# Patient Record
Sex: MALE | Race: WHITE | NOT HISPANIC OR LATINO | Employment: OTHER | URBAN - METROPOLITAN AREA
[De-identification: names, ages, dates, MRNs, and addresses within clinical notes are randomized per-mention and may not be internally consistent; named-entity substitution may affect disease eponyms.]

---

## 2017-01-23 DIAGNOSIS — Z01.812 PRE-OPERATIVE LABORATORY EXAMINATION: ICD-10-CM

## 2017-01-23 LAB
25(OH)D3 SERPL-MCNC: 17 NG/ML (ref 30–100)
ANION GAP SERPL CALC-SCNC: 8 MMOL/L (ref 0–11.9)
APPEARANCE UR: CLEAR
APTT PPP: 32.1 SEC (ref 24.7–36)
BASOPHILS # BLD AUTO: 0.4 % (ref 0–1.8)
BASOPHILS # BLD: 0.04 K/UL (ref 0–0.12)
BILIRUB UR QL STRIP.AUTO: NEGATIVE
BUN SERPL-MCNC: 10 MG/DL (ref 8–22)
CALCIUM SERPL-MCNC: 9.5 MG/DL (ref 8.5–10.5)
CHLORIDE SERPL-SCNC: 101 MMOL/L (ref 96–112)
CO2 SERPL-SCNC: 25 MMOL/L (ref 20–33)
COLOR UR: COLORLESS
CREAT SERPL-MCNC: 0.69 MG/DL (ref 0.5–1.4)
CULTURE IF INDICATED INDCX: NO UA CULTURE
EOSINOPHIL # BLD AUTO: 0.46 K/UL (ref 0–0.51)
EOSINOPHIL NFR BLD: 5.2 % (ref 0–6.9)
ERYTHROCYTE [DISTWIDTH] IN BLOOD BY AUTOMATED COUNT: 50.3 FL (ref 35.9–50)
GFR SERPL CREATININE-BSD FRML MDRD: >60 ML/MIN/1.73 M 2
GLUCOSE SERPL-MCNC: 113 MG/DL (ref 65–99)
GLUCOSE UR STRIP.AUTO-MCNC: NEGATIVE MG/DL
HCT VFR BLD AUTO: 44.7 % (ref 42–52)
HGB BLD-MCNC: 14.6 G/DL (ref 14–18)
IMM GRANULOCYTES # BLD AUTO: 0.02 K/UL (ref 0–0.11)
IMM GRANULOCYTES NFR BLD AUTO: 0.2 % (ref 0–0.9)
INR PPP: 0.97 (ref 0.87–1.13)
KETONES UR STRIP.AUTO-MCNC: NEGATIVE MG/DL
LEUKOCYTE ESTERASE UR QL STRIP.AUTO: NEGATIVE
LYMPHOCYTES # BLD AUTO: 1.92 K/UL (ref 1–4.8)
LYMPHOCYTES NFR BLD: 21.6 % (ref 22–41)
MCH RBC QN AUTO: 30.4 PG (ref 27–33)
MCHC RBC AUTO-ENTMCNC: 32.7 G/DL (ref 33.7–35.3)
MCV RBC AUTO: 92.9 FL (ref 81.4–97.8)
MICRO URNS: NORMAL
MONOCYTES # BLD AUTO: 1.05 K/UL (ref 0–0.85)
MONOCYTES NFR BLD AUTO: 11.8 % (ref 0–13.4)
NEUTROPHILS # BLD AUTO: 5.4 K/UL (ref 1.82–7.42)
NEUTROPHILS NFR BLD: 60.8 % (ref 44–72)
NITRITE UR QL STRIP.AUTO: NEGATIVE
NRBC # BLD AUTO: 0 K/UL
NRBC BLD AUTO-RTO: 0 /100 WBC
PH UR STRIP.AUTO: 6 [PH]
PLATELET # BLD AUTO: 260 K/UL (ref 164–446)
PMV BLD AUTO: 9.3 FL (ref 9–12.9)
POTASSIUM SERPL-SCNC: 4.2 MMOL/L (ref 3.6–5.5)
PROT UR QL STRIP: NEGATIVE MG/DL
PROTHROMBIN TIME: 13.2 SEC (ref 12–14.6)
RBC # BLD AUTO: 4.81 M/UL (ref 4.7–6.1)
RBC UR QL AUTO: NEGATIVE
SODIUM SERPL-SCNC: 134 MMOL/L (ref 135–145)
SP GR UR STRIP.AUTO: 1
WBC # BLD AUTO: 8.9 K/UL (ref 4.8–10.8)

## 2017-01-23 PROCEDURE — 85025 COMPLETE CBC W/AUTO DIFF WBC: CPT

## 2017-01-23 PROCEDURE — 85610 PROTHROMBIN TIME: CPT

## 2017-01-23 PROCEDURE — 80048 BASIC METABOLIC PNL TOTAL CA: CPT

## 2017-01-23 PROCEDURE — 85730 THROMBOPLASTIN TIME PARTIAL: CPT

## 2017-01-23 PROCEDURE — 82306 VITAMIN D 25 HYDROXY: CPT

## 2017-01-23 PROCEDURE — 36415 COLL VENOUS BLD VENIPUNCTURE: CPT

## 2017-01-23 PROCEDURE — 81003 URINALYSIS AUTO W/O SCOPE: CPT

## 2017-01-23 RX ORDER — METOPROLOL SUCCINATE 25 MG/1
25 TABLET, EXTENDED RELEASE ORAL DAILY
COMMUNITY
End: 2017-09-21

## 2017-01-23 RX ORDER — TAMSULOSIN HYDROCHLORIDE 0.4 MG/1
0.4 CAPSULE ORAL
Status: ON HOLD | COMMUNITY
End: 2020-07-27

## 2017-01-23 RX ORDER — ATORVASTATIN CALCIUM 20 MG/1
20 TABLET, FILM COATED ORAL NIGHTLY
COMMUNITY

## 2017-01-23 RX ORDER — GABAPENTIN 100 MG/1
100 CAPSULE ORAL 3 TIMES DAILY
Status: ON HOLD | COMMUNITY
End: 2017-02-01

## 2017-01-23 RX ORDER — AMLODIPINE BESYLATE 5 MG/1
5 TABLET ORAL DAILY
Status: ON HOLD | COMMUNITY
End: 2017-09-25

## 2017-02-01 ENCOUNTER — HOSPITAL ENCOUNTER (OUTPATIENT)
Facility: MEDICAL CENTER | Age: 82
End: 2017-02-03
Attending: NEUROLOGICAL SURGERY | Admitting: NEUROLOGICAL SURGERY
Payer: COMMERCIAL

## 2017-02-01 ENCOUNTER — APPOINTMENT (OUTPATIENT)
Dept: RADIOLOGY | Facility: MEDICAL CENTER | Age: 82
End: 2017-02-01
Attending: NEUROLOGICAL SURGERY
Payer: COMMERCIAL

## 2017-02-01 DIAGNOSIS — M48.061 SPINAL STENOSIS, LUMBAR REGION, WITHOUT NEUROGENIC CLAUDICATION: ICD-10-CM

## 2017-02-01 PROCEDURE — 160036 HCHG PACU - EA ADDL 30 MINS PHASE I: Performed by: NEUROLOGICAL SURGERY

## 2017-02-01 PROCEDURE — 700101 HCHG RX REV CODE 250

## 2017-02-01 PROCEDURE — 160048 HCHG OR STATISTICAL LEVEL 1-5: Performed by: NEUROLOGICAL SURGERY

## 2017-02-01 PROCEDURE — G0378 HOSPITAL OBSERVATION PER HR: HCPCS

## 2017-02-01 PROCEDURE — 700102 HCHG RX REV CODE 250 W/ 637 OVERRIDE(OP): Performed by: PHYSICIAN ASSISTANT

## 2017-02-01 PROCEDURE — 700112 HCHG RX REV CODE 229: Performed by: PHYSICIAN ASSISTANT

## 2017-02-01 PROCEDURE — 501838 HCHG SUTURE GENERAL: Performed by: NEUROLOGICAL SURGERY

## 2017-02-01 PROCEDURE — 96374 THER/PROPH/DIAG INJ IV PUSH: CPT

## 2017-02-01 PROCEDURE — 700111 HCHG RX REV CODE 636 W/ 250 OVERRIDE (IP): Performed by: NEUROLOGICAL SURGERY

## 2017-02-01 PROCEDURE — A9270 NON-COVERED ITEM OR SERVICE: HCPCS | Performed by: PHYSICIAN ASSISTANT

## 2017-02-01 PROCEDURE — 503195 HCHG SEALER, BIPOLAR AQUAMANTYS: Performed by: NEUROLOGICAL SURGERY

## 2017-02-01 PROCEDURE — 110382 HCHG SHELL REV 271: Performed by: NEUROLOGICAL SURGERY

## 2017-02-01 PROCEDURE — 500002 HCHG ADHESIVE, DERMABOND: Performed by: NEUROLOGICAL SURGERY

## 2017-02-01 PROCEDURE — 502240 HCHG MISC OR SUPPLY RC 0272: Performed by: NEUROLOGICAL SURGERY

## 2017-02-01 PROCEDURE — 502708 HCHG CATHETER, ON-Q SILVER SKR: Performed by: NEUROLOGICAL SURGERY

## 2017-02-01 PROCEDURE — 110371 HCHG SHELL REV 272: Performed by: NEUROLOGICAL SURGERY

## 2017-02-01 PROCEDURE — 160041 HCHG SURGERY MINUTES - EA ADDL 1 MIN LEVEL 4: Performed by: NEUROLOGICAL SURGERY

## 2017-02-01 PROCEDURE — 700111 HCHG RX REV CODE 636 W/ 250 OVERRIDE (IP)

## 2017-02-01 PROCEDURE — 160029 HCHG SURGERY MINUTES - 1ST 30 MINS LEVEL 4: Performed by: NEUROLOGICAL SURGERY

## 2017-02-01 PROCEDURE — 700111 HCHG RX REV CODE 636 W/ 250 OVERRIDE (IP): Performed by: PHYSICIAN ASSISTANT

## 2017-02-01 PROCEDURE — 502626 HCHG SURGIFLO HEMOSTATIC MATRIX 6ML: Performed by: NEUROLOGICAL SURGERY

## 2017-02-01 PROCEDURE — 160035 HCHG PACU - 1ST 60 MINS PHASE I: Performed by: NEUROLOGICAL SURGERY

## 2017-02-01 PROCEDURE — 500367 HCHG DRAIN KIT, HEMOVAC: Performed by: NEUROLOGICAL SURGERY

## 2017-02-01 PROCEDURE — 700102 HCHG RX REV CODE 250 W/ 637 OVERRIDE(OP)

## 2017-02-01 PROCEDURE — 160002 HCHG RECOVERY MINUTES (STAT): Performed by: NEUROLOGICAL SURGERY

## 2017-02-01 PROCEDURE — 160009 HCHG ANES TIME/MIN: Performed by: NEUROLOGICAL SURGERY

## 2017-02-01 PROCEDURE — A6222 GAUZE <=16 IN NO W/SAL W/O B: HCPCS | Performed by: NEUROLOGICAL SURGERY

## 2017-02-01 PROCEDURE — A4606 OXYGEN PROBE USED W OXIMETER: HCPCS | Performed by: NEUROLOGICAL SURGERY

## 2017-02-01 PROCEDURE — A9270 NON-COVERED ITEM OR SERVICE: HCPCS

## 2017-02-01 PROCEDURE — 72020 X-RAY EXAM OF SPINE 1 VIEW: CPT

## 2017-02-01 RX ORDER — AMOXICILLIN 250 MG
1 CAPSULE ORAL NIGHTLY
Status: DISCONTINUED | OUTPATIENT
Start: 2017-02-01 | End: 2017-02-03 | Stop reason: HOSPADM

## 2017-02-01 RX ORDER — OMEPRAZOLE 40 MG/1
40 CAPSULE, DELAYED RELEASE ORAL DAILY
COMMUNITY
End: 2017-09-21

## 2017-02-01 RX ORDER — ENEMA 19; 7 G/133ML; G/133ML
1 ENEMA RECTAL
Status: DISCONTINUED | OUTPATIENT
Start: 2017-02-01 | End: 2017-02-03 | Stop reason: HOSPADM

## 2017-02-01 RX ORDER — LABETALOL HYDROCHLORIDE 5 MG/ML
10 INJECTION, SOLUTION INTRAVENOUS
Status: DISCONTINUED | OUTPATIENT
Start: 2017-02-01 | End: 2017-02-03 | Stop reason: HOSPADM

## 2017-02-01 RX ORDER — DIPHENHYDRAMINE HYDROCHLORIDE 50 MG/ML
25 INJECTION INTRAMUSCULAR; INTRAVENOUS EVERY 6 HOURS PRN
Status: DISCONTINUED | OUTPATIENT
Start: 2017-02-01 | End: 2017-02-03 | Stop reason: HOSPADM

## 2017-02-01 RX ORDER — ACETAMINOPHEN 500 MG
1000 TABLET ORAL
Status: ON HOLD | COMMUNITY
End: 2017-02-03

## 2017-02-01 RX ORDER — BISACODYL 10 MG
10 SUPPOSITORY, RECTAL RECTAL
Status: DISCONTINUED | OUTPATIENT
Start: 2017-02-01 | End: 2017-02-03 | Stop reason: HOSPADM

## 2017-02-01 RX ORDER — DIPHENHYDRAMINE HCL 25 MG
25 TABLET ORAL EVERY 6 HOURS PRN
Status: DISCONTINUED | OUTPATIENT
Start: 2017-02-01 | End: 2017-02-03 | Stop reason: HOSPADM

## 2017-02-01 RX ORDER — POLYETHYLENE GLYCOL 3350 17 G/17G
1 POWDER, FOR SOLUTION ORAL 2 TIMES DAILY PRN
Status: DISCONTINUED | OUTPATIENT
Start: 2017-02-01 | End: 2017-02-03 | Stop reason: HOSPADM

## 2017-02-01 RX ORDER — ONDANSETRON 4 MG/1
4 TABLET, ORALLY DISINTEGRATING ORAL EVERY 4 HOURS PRN
Status: DISCONTINUED | OUTPATIENT
Start: 2017-02-01 | End: 2017-02-03 | Stop reason: HOSPADM

## 2017-02-01 RX ORDER — METOPROLOL SUCCINATE 25 MG/1
25 TABLET, EXTENDED RELEASE ORAL DAILY
Status: DISCONTINUED | OUTPATIENT
Start: 2017-02-02 | End: 2017-02-03 | Stop reason: HOSPADM

## 2017-02-01 RX ORDER — MAGNESIUM HYDROXIDE 1200 MG/15ML
LIQUID ORAL
Status: DISCONTINUED | OUTPATIENT
Start: 2017-02-01 | End: 2017-02-01 | Stop reason: HOSPADM

## 2017-02-01 RX ORDER — AMLODIPINE BESYLATE 5 MG/1
5 TABLET ORAL DAILY
Status: DISCONTINUED | OUTPATIENT
Start: 2017-02-02 | End: 2017-02-03 | Stop reason: HOSPADM

## 2017-02-01 RX ORDER — SODIUM CHLORIDE AND POTASSIUM CHLORIDE 150; 900 MG/100ML; MG/100ML
INJECTION, SOLUTION INTRAVENOUS
Status: COMPLETED
Start: 2017-02-01 | End: 2017-02-01

## 2017-02-01 RX ORDER — SODIUM CHLORIDE AND POTASSIUM CHLORIDE 150; 900 MG/100ML; MG/100ML
INJECTION, SOLUTION INTRAVENOUS CONTINUOUS
Status: DISCONTINUED | OUTPATIENT
Start: 2017-02-01 | End: 2017-02-02

## 2017-02-01 RX ORDER — CEFAZOLIN SODIUM 2 G/100ML
2 INJECTION, SOLUTION INTRAVENOUS EVERY 8 HOURS
Status: COMPLETED | OUTPATIENT
Start: 2017-02-01 | End: 2017-02-02

## 2017-02-01 RX ORDER — BUPIVACAINE HYDROCHLORIDE AND EPINEPHRINE 5; 5 MG/ML; UG/ML
INJECTION, SOLUTION EPIDURAL; INTRACAUDAL; PERINEURAL
Status: DISCONTINUED | OUTPATIENT
Start: 2017-02-01 | End: 2017-02-01 | Stop reason: HOSPADM

## 2017-02-01 RX ORDER — SODIUM CHLORIDE, SODIUM LACTATE, POTASSIUM CHLORIDE, CALCIUM CHLORIDE 600; 310; 30; 20 MG/100ML; MG/100ML; MG/100ML; MG/100ML
1000 INJECTION, SOLUTION INTRAVENOUS
Status: COMPLETED | OUTPATIENT
Start: 2017-02-01 | End: 2017-02-01

## 2017-02-01 RX ORDER — AMOXICILLIN 250 MG
1 CAPSULE ORAL
Status: DISCONTINUED | OUTPATIENT
Start: 2017-02-01 | End: 2017-02-03 | Stop reason: HOSPADM

## 2017-02-01 RX ORDER — ZOLPIDEM TARTRATE 5 MG/1
5 TABLET ORAL
Status: DISCONTINUED | OUTPATIENT
Start: 2017-02-02 | End: 2017-02-03 | Stop reason: HOSPADM

## 2017-02-01 RX ORDER — ONDANSETRON 2 MG/ML
4 INJECTION INTRAMUSCULAR; INTRAVENOUS EVERY 4 HOURS PRN
Status: DISCONTINUED | OUTPATIENT
Start: 2017-02-01 | End: 2017-02-03 | Stop reason: HOSPADM

## 2017-02-01 RX ORDER — TAMSULOSIN HYDROCHLORIDE 0.4 MG/1
0.4 CAPSULE ORAL
Status: DISCONTINUED | OUTPATIENT
Start: 2017-02-02 | End: 2017-02-03 | Stop reason: HOSPADM

## 2017-02-01 RX ORDER — METHOCARBAMOL 750 MG/1
750 TABLET, FILM COATED ORAL EVERY 8 HOURS PRN
Status: DISCONTINUED | OUTPATIENT
Start: 2017-02-01 | End: 2017-02-03 | Stop reason: HOSPADM

## 2017-02-01 RX ORDER — DOCUSATE SODIUM 100 MG/1
100 CAPSULE, LIQUID FILLED ORAL 2 TIMES DAILY
Status: DISCONTINUED | OUTPATIENT
Start: 2017-02-01 | End: 2017-02-03 | Stop reason: HOSPADM

## 2017-02-01 RX ORDER — OXYCODONE HCL 5 MG/5 ML
SOLUTION, ORAL ORAL
Status: COMPLETED
Start: 2017-02-01 | End: 2017-02-01

## 2017-02-01 RX ORDER — ROPIVACAINE HYDROCHLORIDE 2 MG/ML
INJECTION, SOLUTION EPIDURAL; INFILTRATION; PERINEURAL
Status: DISCONTINUED | OUTPATIENT
Start: 2017-02-01 | End: 2017-02-01 | Stop reason: HOSPADM

## 2017-02-01 RX ORDER — ATORVASTATIN CALCIUM 20 MG/1
20 TABLET, FILM COATED ORAL NIGHTLY
Status: DISCONTINUED | OUTPATIENT
Start: 2017-02-01 | End: 2017-02-03 | Stop reason: HOSPADM

## 2017-02-01 RX ADMIN — FENTANYL CITRATE 25 MCG: 50 INJECTION, SOLUTION INTRAMUSCULAR; INTRAVENOUS at 19:25

## 2017-02-01 RX ADMIN — STANDARDIZED SENNA CONCENTRATE AND DOCUSATE SODIUM 1 TABLET: 8.6; 5 TABLET, FILM COATED ORAL at 22:54

## 2017-02-01 RX ADMIN — FENTANYL CITRATE 25 MCG: 50 INJECTION, SOLUTION INTRAMUSCULAR; INTRAVENOUS at 19:35

## 2017-02-01 RX ADMIN — DOCUSATE SODIUM 100 MG: 100 CAPSULE ORAL at 22:54

## 2017-02-01 RX ADMIN — FENTANYL CITRATE 25 MCG: 50 INJECTION, SOLUTION INTRAMUSCULAR; INTRAVENOUS at 19:05

## 2017-02-01 RX ADMIN — SODIUM CHLORIDE, SODIUM LACTATE, POTASSIUM CHLORIDE, CALCIUM CHLORIDE 1000 ML: 600; 310; 30; 20 INJECTION, SOLUTION INTRAVENOUS at 14:15

## 2017-02-01 RX ADMIN — ROPIVACAINE HYDROCHLORIDE: 2 INJECTION, SOLUTION EPIDURAL; INFILTRATION at 18:30

## 2017-02-01 RX ADMIN — CEFAZOLIN SODIUM 2 G: 2 INJECTION, SOLUTION INTRAVENOUS at 22:55

## 2017-02-01 RX ADMIN — ATORVASTATIN CALCIUM 20 MG: 20 TABLET, FILM COATED ORAL at 22:54

## 2017-02-01 RX ADMIN — OXYCODONE HYDROCHLORIDE 10 MG: 5 SOLUTION ORAL at 19:00

## 2017-02-01 RX ADMIN — POTASSIUM CHLORIDE AND SODIUM CHLORIDE: 900; 150 INJECTION, SOLUTION INTRAVENOUS at 19:50

## 2017-02-01 RX ADMIN — FENTANYL CITRATE 25 MCG: 50 INJECTION, SOLUTION INTRAMUSCULAR; INTRAVENOUS at 19:15

## 2017-02-01 RX ADMIN — MORPHINE SULFATE: 50 INJECTION, SOLUTION, CONCENTRATE INTRAVENOUS at 19:25

## 2017-02-01 ASSESSMENT — PAIN SCALES - GENERAL
PAINLEVEL_OUTOF10: 4
PAINLEVEL_OUTOF10: 4
PAINLEVEL_OUTOF10: 0
PAINLEVEL_OUTOF10: 4
PAINLEVEL_OUTOF10: 3
PAINLEVEL_OUTOF10: 4
PAINLEVEL_OUTOF10: 4
PAINLEVEL_OUTOF10: 5
PAINLEVEL_OUTOF10: 1
PAINLEVEL_OUTOF10: 0

## 2017-02-01 NOTE — IP AVS SNAPSHOT
" After Visit Summary                                                                                                                  [facility]  Medical Record Number:[id number]  CSN: [id number]    YOB: 1935   Age: 81 y.o.  Sex: male  HT:1.753 m (5' 9\") WT: 100 kg (220 lb 7.4 oz)          Admit Date: 2/1/2017     Discharge Date:   Today's Date: 2/3/2017  Attending Doctor:  Shana Salamanca M.D.                  Allergies:  Review of patient's allergies indicates no known allergies.            Discharge Instructions       Discharge Instructions    Discharged to home by car with relative. Discharged via wheelchair, hospital escort: Yes.  Special equipment needed: Not Applicable    Be sure to schedule a follow-up appointment with your primary care doctor or any specialists as instructed.     Discharge Plan:        I understand that a diet low in cholesterol, fat, and sodium is recommended for good health. Unless I have been given specific instructions below for another diet, I accept this instruction as my diet prescription.   Other diet: Regular    Incision Care  An incision is when a surgeon cuts into your body. After surgery, the incision needs to be cared for properly to prevent infection.   HOW TO CARE FOR YOUR INCISION  · Take medicines only as directed by your health care provider.  · There are many different ways to close and cover an incision, including stitches, skin glue, and adhesive strips. Follow your health care provider's instructions on:  ¨ Incision care.  ¨ Bandage (dressing) changes and removal.  ¨ Incision closure removal.  · Do not take baths, swim, or use a hot tub until your health care provider approves. You may shower as directed by your health care provider.  · Resume your normal diet and activities as directed.  · Use anti-itch medicine (such as an antihistamine) as directed by your health care provider. The incision may itch while it is healing. Do not pick or " scratch at the incision.  · Drink enough fluid to keep your urine clear or pale yellow.  SEEK MEDICAL CARE IF:   · You have drainage, redness, swelling, or pain at your incision site.  · You have muscle aches, chills, or a general ill feeling.  · You notice a bad smell coming from the incision or dressing.  · Your incision edges separate after the sutures, staples, or skin adhesive strips have been removed.  · You have persistent nausea or vomiting.  · You have a fever.  · You are dizzy.  SEEK IMMEDIATE MEDICAL CARE IF:   · You have a rash.  · You faint.  · You have difficulty breathing.  MAKE SURE YOU:   · Understand these instructions.  · Will watch your condition.  · Will get help right away if you are not doing well or get worse.     This information is not intended to replace advice given to you by your health care provider. Make sure you discuss any questions you have with your health care provider.     Document Released: 07/07/2006 Document Revised: 01/08/2016 Document Reviewed: 02/11/2015  The [employer] Interactive Patient Education ©2016 [employer]      Special Instructions: Discharge instructions for the Orthopedic Patient    Follow up with Primary Care Physician within 2 weeks of discharge to home, regarding:  Review of medications and diagnostic testing.  Surveillance for medical complications.  Workup and treatment of osteoporosis, if appropriate.     -Is this a Joint Replacement patient? No    -Is this patient being discharged with medication to prevent blood clots?  No    · Is patient discharged on Warfarin / Coumadin?   No     · Is patient Post Blood Transfusion?  No    Depression / Suicide Risk    As you are discharged from this [facility] facility, it is important to learn how to keep safe from harming yourself.    Recognize the warning signs:  · Abrupt changes in personality, positive or negative- including increase in energy   · Giving away possessions  · Change in eating patterns- significant  weight changes-  positive or negative  · Change in sleeping patterns- unable to sleep or sleeping all the time   · Unwillingness or inability to communicate  · Depression  · Unusual sadness, discouragement and loneliness  · Talk of wanting to die  · Neglect of personal appearance   · Rebelliousness- reckless behavior  · Withdrawal from people/activities they love  · Confusion- inability to concentrate     If you or a loved one observes any of these behaviors or has concerns about self-harm, here's what you can do:  · Talk about it- your feelings and reasons for harming yourself  · Remove any means that you might use to hurt yourself (examples: pills, rope, extension cords, firearm)  · Get professional help from the community (Mental Health, Substance Abuse, psychological counseling)  · Do not be alone:Call your Safe Contact- someone whom you trust who will be there for you.  · Call your local CRISIS HOTLINE 507-6763 or 043-937-4132  · Call your local Children's Mobile Crisis Response Team [place] (830) 353-3602 or [employer]  · Call the toll free National Suicide Prevention Hotlines   · National Suicide Prevention Lifeline 091-164-AFJQ (8915)  · National Hope Line Network 800-SUICIDE (841-0009)           Discharge Medication Instructions:    Below are the medications your physician expects you to take upon discharge:    Review all your home medications and newly ordered medications with your doctor and/or pharmacist. Follow medication instructions as directed by your doctor and/or pharmacist.    Please keep your medication list with you and share with your physician.               Medication List      START taking these medications        Instructions    cephALEXin 500 MG Caps   Commonly known as:  KEFLEX    Take 1 Cap by mouth 4 times a day.   Dose:  500 mg       methocarbamol 750 MG Tabs   Last time this was given:  750 mg on 2/3/2017  9:53 AM   Commonly known as:  ROBAXIN    Take 1 Tab by mouth  every 8 hours as needed.   Dose:  750 mg       oxycodone-acetaminophen 5-325 MG Tabs   Last time this was given:  1 Tab on 2/3/2017  4:34 AM   Commonly known as:  PERCOCET    Take 1-2 Tabs by mouth every four hours as needed for Severe Pain.   Dose:  1-2 Tab         CONTINUE taking these medications        Instructions    amlodipine 5 MG Tabs   Last time this was given:  5 mg on 2/3/2017  9:00 AM   Commonly known as:  NORVASC    Take 5 mg by mouth every day.   Dose:  5 mg       atorvastatin 20 MG Tabs   Last time this was given:  20 mg on 2/2/2017 10:02 PM   Commonly known as:  LIPITOR    Take 20 mg by mouth every evening.   Dose:  20 mg       metoprolol SR 25 MG Tb24   Last time this was given:  25 mg on 2/3/2017  9:54 AM   Commonly known as:  TOPROL XL    Take 25 mg by mouth every day.   Dose:  25 mg       omeprazole 40 MG delayed-release capsule   Commonly known as:  PRILOSEC    Take 40 mg by mouth every day.   Dose:  40 mg       tamsulosin 0.4 MG capsule   Last time this was given:  0.4 mg on 2/3/2017  9:54 AM   Commonly known as:  FLOMAX    Take 0.4 mg by mouth ONE-HALF HOUR AFTER BREAKFAST.   Dose:  0.4 mg         STOP taking these medications     acetaminophen 500 MG Tabs   Commonly known as:  TYLENOL               Instructions           Diet / Nutrition:    Follow any diet instructions given to you by your doctor or the dietician, including how much salt (sodium) you are allowed each day.    If you are overweight, talk to your doctor about a weight reduction plan.    Activity:    Remain physically active following your doctor's instructions about exercise and activity.    Rest often.     Any time you become even a little tired or short of breath, SIT DOWN and rest.    Worsening Symptoms:    Report any of the following signs and symptoms to the doctor's office immediately:    *Pain of jaw, arm, or neck  *Chest pain not relieved by medication                               *Dizziness or loss of  consciousness  *Difficulty breathing even when at rest   *More tired than usual                                       *Bleeding drainage or swelling of surgical site  *Swelling of feet, ankles, legs or stomach                 *Fever (>100ºF)  *Pink or blood tinged sputum  *Weight gain (3lbs/day or 5lbs /week)           *Shock from internal defibrillator (if applicable)  *Palpitations or irregular heartbeats                *Cool and/or numb extremities    Stroke Awareness    Common Risk Factors for Stroke include:    Age  Atrial Fibrillation  Carotid Artery Stenosis  Diabetes Mellitus  Excessive alcohol consumption  High blood pressure  Overweight   Physical inactivity  Smoking    Warning signs and symptoms of a stroke include:    *Sudden numbness or weakness of the face, arm or leg (especially on one side of the body).  *Sudden confusion, trouble speaking or understanding.  *Sudden trouble seeing in one or both eyes.  *Sudden trouble walking, dizziness, loss of balance or coordination.Sudden severe headache with no known cause.    It is very important to get treatment quickly when a stroke occurs. If you experience any of the above warning signs, call 911 immediately.                   Disclaimer         Quit Smoking / Tobacco Use:    I understand the use of any tobacco products increases my chance of suffering from future heart disease or stroke and could cause other illnesses which may shorten my life. Quitting the use of tobacco products is the single most important thing I can do to improve my health. For further information on smoking / tobacco cessation call a Toll Free Quit Line at 1-624.163.9098 (*National [employer] [place]) or 1-721.114.3391 (American Lung Association) or you can access the web based program at www.lungusa.org.    [place] Tobacco Users Help Line:  (596) 304-9784       Toll Free: 1-247.733.2093  Quit Tobacco Program [facility] (184)[phone]    Crisis Hotline:    National  Crisis Hotline:  5-069-CXZDHEO or 1-387.592.4574    [place] Crisis Hotline:    1-252.303.4204 or 994-839-5375    Discharge Survey:   Thank you for choosing [facility]. We hope we did everything we could to make your hospital stay a pleasant one. You may be receiving a phone survey and we would appreciate your time and participation in answering the questions. Your input is very valuable to us in our efforts to improve our service to our patients and their families.        My signature on this form indicates that:    1. I have reviewed and understand the above information.  2. My questions regarding this information have been answered to my satisfaction.  3. I have formulated a plan with my discharge nurse to obtain my prescribed medications for home.                  Disclaimer         __________________________________                     __________       ________                       Patient Signature                                                 Date                    Time

## 2017-02-01 NOTE — IP AVS SNAPSHOT
Tern Access Code: Activation code not generated  Current Tern Status: Patient Declined    [employer]  A secure, online tool to manage your health information     [facility]’s Tern® is a secure, online tool that connects you to your personalized health information from the privacy of your home -- day or night - making it very easy for you to manage your healthcare. Once the activation process is completed, you can even access your medical information using the Tern agustín, which is available for free in the Apple Agustín store or Google Play store.     Tern provides the following levels of access (as shown below):   My Chart Features   [facility] Primary Care Doctor [facility]  Specialists [facility]  Urgent  Care Non-[facility]  Primary Care  Doctor   Email your healthcare team securely and privately 24/7 X X X X   Manage appointments: schedule your next appointment; view details of past/upcoming appointments X      Request prescription refills. X      View recent personal medical records, including lab and immunizations X X X X   View health record, including health history, allergies, medications X X X X   Read reports about your outpatient visits, procedures, consult and ER notes X X X X   See your discharge summary, which is a recap of your hospital and/or ER visit that includes your diagnosis, lab results, and care plan. X X       How to register for Tern:  1. Go to  [email or web address].  2. Click on the Sign Up Now box, which takes you to the New Member Sign Up page. You will need to provide the following information:  a. Enter your Tern Access Code exactly as it appears at the top of this page. (You will not need to use this code after you’ve completed the sign-up process. If you do not sign up before the expiration date, you must request a new code.)   b. Enter your date of birth.   c. Enter your home email address.   d. Click Submit, and follow the next screen’s instructions.  3. Create a Tern ID.  This will be your [employer] login ID and cannot be changed, so think of one that is secure and easy to remember.  4. Create a [employer] password. You can change your password at any time.  5. Enter your Password Reset Question and Answer. This can be used at a later time if you forget your password.   6. Enter your e-mail address. This allows you to receive e-mail notifications when new information is available in [employer].  7. Click Sign Up. You can now view your health information.    For assistance activating your [employer] account, call [phone]

## 2017-02-01 NOTE — IP AVS SNAPSHOT
2/3/2017          [facility]  [address] [place] [address]    Dear Jamil Roberts:    [facility] wants to ensure your discharge home is safe and you or your loved ones have had all your questions answered regarding your care after you leave the hospital.    You may receive a telephone call within two days of your discharge.  This call is to make certain you understand your discharge instructions as well as ensure we provided you with the best care possible during your stay with us.     The call will only last approximately 3-5 minutes and will be done by a nurse.    Once again, we want to ensure your discharge home is safe and that you have a clear understanding of any next steps in your care.  If you have any questions or concerns, please do not hesitate to contact us, we are here for you.  Thank you for choosing [facility] for your healthcare needs.    Sincerely,    Etienne Altamirano    [facility]

## 2017-02-01 NOTE — IP AVS SNAPSHOT
" <p align=\"LEFT\"><IMG SRC=\"//[email or web address]/blob$/[email or web address]\" alt=\"Image\" WIDTH=\"50%\" HEIGHT=\"200\" BORDER=\"\"></p>                   [facility]  Medical Record Number:[id number]  CSN: [id number]    YOB: 1935   Age: 81 y.o.  Sex: male  HT:1.753 m (5' 9\") WT: 100 kg (220 lb 7.4 oz)          Admit Date: 2/1/2017     Discharge Date:   Today's Date: 2/3/2017  Attending Doctor:  Shana Salamanca M.D.                  Allergies:  Review of patient's allergies indicates no known allergies.             Medication List      Take these Medications        Instructions    amlodipine 5 MG Tabs   Commonly known as:  NORVASC    Take 5 mg by mouth every day.   Dose:  5 mg       atorvastatin 20 MG Tabs   Commonly known as:  LIPITOR    Take 20 mg by mouth every evening.   Dose:  20 mg       cephALEXin 500 MG Caps   Commonly known as:  KEFLEX    Take 1 Cap by mouth 4 times a day.   Dose:  500 mg       methocarbamol 750 MG Tabs   Commonly known as:  ROBAXIN    Take 1 Tab by mouth every 8 hours as needed.   Dose:  750 mg       metoprolol SR 25 MG Tb24   Commonly known as:  TOPROL XL    Take 25 mg by mouth every day.   Dose:  25 mg       omeprazole 40 MG delayed-release capsule   Commonly known as:  PRILOSEC    Take 40 mg by mouth every day.   Dose:  40 mg       oxycodone-acetaminophen 5-325 MG Tabs   Commonly known as:  PERCOCET    Take 1-2 Tabs by mouth every four hours as needed for Severe Pain.   Dose:  1-2 Tab       tamsulosin 0.4 MG capsule   Commonly known as:  FLOMAX    Take 0.4 mg by mouth ONE-HALF HOUR AFTER BREAKFAST.   Dose:  0.4 mg         "

## 2017-02-02 LAB
ANION GAP SERPL CALC-SCNC: 9 MMOL/L (ref 0–11.9)
BUN SERPL-MCNC: 10 MG/DL (ref 8–22)
CALCIUM SERPL-MCNC: 8.6 MG/DL (ref 8.5–10.5)
CHLORIDE SERPL-SCNC: 101 MMOL/L (ref 96–112)
CO2 SERPL-SCNC: 21 MMOL/L (ref 20–33)
CREAT SERPL-MCNC: 0.58 MG/DL (ref 0.5–1.4)
ERYTHROCYTE [DISTWIDTH] IN BLOOD BY AUTOMATED COUNT: 50.1 FL (ref 35.9–50)
GFR SERPL CREATININE-BSD FRML MDRD: >60 ML/MIN/1.73 M 2
GLUCOSE SERPL-MCNC: 189 MG/DL (ref 65–99)
HCT VFR BLD AUTO: 38.8 % (ref 42–52)
HGB BLD-MCNC: 12.7 G/DL (ref 14–18)
MCH RBC QN AUTO: 30.5 PG (ref 27–33)
MCHC RBC AUTO-ENTMCNC: 32.7 G/DL (ref 33.7–35.3)
MCV RBC AUTO: 93 FL (ref 81.4–97.8)
PLATELET # BLD AUTO: 209 K/UL (ref 164–446)
PMV BLD AUTO: 9 FL (ref 9–12.9)
POTASSIUM SERPL-SCNC: 4.3 MMOL/L (ref 3.6–5.5)
RBC # BLD AUTO: 4.17 M/UL (ref 4.7–6.1)
SODIUM SERPL-SCNC: 131 MMOL/L (ref 135–145)
WBC # BLD AUTO: 10.2 K/UL (ref 4.8–10.8)

## 2017-02-02 PROCEDURE — G8988 SELF CARE GOAL STATUS: HCPCS | Mod: CI

## 2017-02-02 PROCEDURE — 97165 OT EVAL LOW COMPLEX 30 MIN: CPT

## 2017-02-02 PROCEDURE — G8978 MOBILITY CURRENT STATUS: HCPCS | Mod: CI

## 2017-02-02 PROCEDURE — G8987 SELF CARE CURRENT STATUS: HCPCS | Mod: CI

## 2017-02-02 PROCEDURE — A9270 NON-COVERED ITEM OR SERVICE: HCPCS | Performed by: PHYSICIAN ASSISTANT

## 2017-02-02 PROCEDURE — 96376 TX/PRO/DX INJ SAME DRUG ADON: CPT

## 2017-02-02 PROCEDURE — 700102 HCHG RX REV CODE 250 W/ 637 OVERRIDE(OP): Performed by: PHYSICIAN ASSISTANT

## 2017-02-02 PROCEDURE — 80048 BASIC METABOLIC PNL TOTAL CA: CPT

## 2017-02-02 PROCEDURE — 700101 HCHG RX REV CODE 250: Performed by: PHYSICIAN ASSISTANT

## 2017-02-02 PROCEDURE — G8980 MOBILITY D/C STATUS: HCPCS | Mod: CI

## 2017-02-02 PROCEDURE — 36415 COLL VENOUS BLD VENIPUNCTURE: CPT

## 2017-02-02 PROCEDURE — G8979 MOBILITY GOAL STATUS: HCPCS | Mod: CI

## 2017-02-02 PROCEDURE — 97161 PT EVAL LOW COMPLEX 20 MIN: CPT

## 2017-02-02 PROCEDURE — 96375 TX/PRO/DX INJ NEW DRUG ADDON: CPT

## 2017-02-02 PROCEDURE — G0378 HOSPITAL OBSERVATION PER HR: HCPCS

## 2017-02-02 PROCEDURE — 700111 HCHG RX REV CODE 636 W/ 250 OVERRIDE (IP): Performed by: PHYSICIAN ASSISTANT

## 2017-02-02 PROCEDURE — 85027 COMPLETE CBC AUTOMATED: CPT

## 2017-02-02 PROCEDURE — 700112 HCHG RX REV CODE 229: Performed by: PHYSICIAN ASSISTANT

## 2017-02-02 RX ORDER — HYDROCODONE BITARTRATE AND ACETAMINOPHEN 5; 325 MG/1; MG/1
1-2 TABLET ORAL EVERY 4 HOURS PRN
Status: DISCONTINUED | OUTPATIENT
Start: 2017-02-02 | End: 2017-02-03 | Stop reason: HOSPADM

## 2017-02-02 RX ORDER — OXYCODONE HYDROCHLORIDE AND ACETAMINOPHEN 5; 325 MG/1; MG/1
1 TABLET ORAL EVERY 4 HOURS PRN
Status: DISCONTINUED | OUTPATIENT
Start: 2017-02-02 | End: 2017-02-03 | Stop reason: HOSPADM

## 2017-02-02 RX ADMIN — STANDARDIZED SENNA CONCENTRATE AND DOCUSATE SODIUM 1 TABLET: 8.6; 5 TABLET, FILM COATED ORAL at 22:02

## 2017-02-02 RX ADMIN — DOCUSATE SODIUM 100 MG: 100 CAPSULE ORAL at 08:32

## 2017-02-02 RX ADMIN — AMLODIPINE BESYLATE 5 MG: 5 TABLET ORAL at 08:32

## 2017-02-02 RX ADMIN — HYDROCODONE BITARTRATE AND ACETAMINOPHEN 1 TABLET: 5; 325 TABLET ORAL at 22:02

## 2017-02-02 RX ADMIN — CEFAZOLIN SODIUM 2 G: 2 INJECTION, SOLUTION INTRAVENOUS at 05:24

## 2017-02-02 RX ADMIN — POTASSIUM CHLORIDE AND SODIUM CHLORIDE: 900; 150 INJECTION, SOLUTION INTRAVENOUS at 05:26

## 2017-02-02 RX ADMIN — TAMSULOSIN HYDROCHLORIDE 0.4 MG: 0.4 CAPSULE ORAL at 08:32

## 2017-02-02 RX ADMIN — DOCUSATE SODIUM 100 MG: 100 CAPSULE ORAL at 22:02

## 2017-02-02 RX ADMIN — ONDANSETRON 4 MG: 2 INJECTION, SOLUTION INTRAMUSCULAR; INTRAVENOUS at 00:14

## 2017-02-02 RX ADMIN — METOPROLOL SUCCINATE 25 MG: 25 TABLET, EXTENDED RELEASE ORAL at 08:32

## 2017-02-02 RX ADMIN — ATORVASTATIN CALCIUM 20 MG: 20 TABLET, FILM COATED ORAL at 22:02

## 2017-02-02 ASSESSMENT — PAIN SCALES - GENERAL
PAINLEVEL_OUTOF10: 1
PAINLEVEL_OUTOF10: 4
PAINLEVEL_OUTOF10: 1
PAINLEVEL_OUTOF10: 0

## 2017-02-02 ASSESSMENT — GAIT ASSESSMENTS
DISTANCE (FEET): 500
GAIT LEVEL OF ASSIST: STAND BY ASSIST
ASSISTIVE DEVICE: FRONT WHEEL WALKER

## 2017-02-02 ASSESSMENT — ACTIVITIES OF DAILY LIVING (ADL): TOILETING: INDEPENDENT

## 2017-02-02 NOTE — THERAPY
"Occupational Therapy Evaluation completed.   Functional Status:  Supv supine > < EOB, supv transfers with FWW, supv LB dressing/toileting/standing grooming   Plan of Care: Patient with no further skilled OT needs in the acute care setting at this time  Discharge Recommendations:  Equipment: Front-Wheel Walker. Post-acute therapy recommended after discharged home.    See \"Rehab Therapy-Acute\" Patient Summary Report for complete documentation.    81 y.o. male s/p L3-S1 lami. Pt is limited by spinal precautions but compensates well. OT provided education on safe body mechanics during ADL and functional mobility. Good demo of neutral spine. No further acute OT needs at this time.     "

## 2017-02-02 NOTE — THERAPY
"Physical Therapy Evaluation completed.   Bed Mobility:  Supine to Sit: Supervised  Transfers: Sit to Stand: Supervised  Gait: Level Of Assist: Stand by Assist with Front-Wheel Walker       Plan of Care: Patient with no further skilled PT needs in the acute care setting at this time  Discharge Recommendations: Equipment: Front-Wheel Walker. Post-acute therapy recommended after discharged home.    See \"Rehab Therapy-Acute\" Patient Summary Report for complete documentation.     "

## 2017-02-02 NOTE — OR SURGEON
Immediate Post-Operative Note      PreOp Diagnosis: spinal stenosis  PostOp Diagnosis: spinal stenosis    Procedure(s):  LUMBAR LAMINECTOMY DISKECTOMY - L3-4, L4-5, L5-S1 - Wound Class: Clean with Drain  LUMBAR DECOMPRESSION - Wound Class: Clean with Drain  FORAMINOTOMY - Wound Class: Clean with Drain    Surgeon(s):  Shana Salamanca M.D.    Anesthesiologist/Type of Anesthesia:  Anesthesiologist: Pooja Lay M.D./General    Surgical Staff:  Circulator: Sophie Paredes R.N.  Relief Circulator: Hannah Miles R.N.  Relief Scrub: Uri Drew Jr.  Scrub Person: Etelvina Canales  First Assist: Amaury Arcos PA-C  Radiology Technician: Andree Lopez      Assistants: Amaury Arcos PA-C,  Specimen: nil    Estimated Blood Loss: 100 cc    Findings: n/a    Complications: nil      2/1/2017 6:20 PM Shana Salamanca

## 2017-02-02 NOTE — PROGRESS NOTES
"Neurosurgery  POD# 1  Ambulating  Voiding  Tolerating oral medications  Denies nausea, vomiting, headache, some N/V overnight  Pain controlled on current medication regimen  Leg symptoms improved    Objective:  Blood pressure 113/70, pulse 76, temperature 36.2 °C (97.2 °F), resp. rate 16, height 1.753 m (5' 9\"), weight 100 kg (220 lb 7.4 oz), SpO2 96 %.    Intake/Output Summary (Last 24 hours) at 02/02/17 0919  Last data filed at 02/02/17 0715   Gross per 24 hour   Intake   1442 ml   Output    285 ml   Net   1157 ml       Recent Labs      02/02/17   0206   WBC  10.2   RBC  4.17*   HEMOGLOBIN  12.7*   HEMATOCRIT  38.8*   MCV  93.0   MCH  30.5   MCHC  32.7*   RDW  50.1*   PLATELETCT  209   MPV  9.0     Recent Labs      02/02/17   0206   SODIUM  131*   POTASSIUM  4.3   CHLORIDE  101   CO2  21   GLUCOSE  189*   BUN  10         VSS  LS  Hemovac 100cc/8hr am  Surgical incision clean, dry, intact, no evidence of infection  Strength:  Lower extremities are 5/5 grossly  Otherwise neurologically intact    Assessment:  Active Hospital Problems    Diagnosis   • Spinal stenosis, lumbar region, without neurogenic claudication [M48.06]         Plan:  1. Ambulate with PT/OT as tolerated  2. Advance diet as tolerated  3. D/c PCA - advance orals, norco or percocet   4. D/c IV fluids  5. We will watch drain    "

## 2017-02-02 NOTE — PROGRESS NOTES
Received report from Kristopher GERBER. Pt is A&Ox4. Morphine PCA and Q-pump in place. Pt states pain is being well controlled. Scored it a 1/10. Pt ambulated to the restroom and experienced 1 bout of n/v. Medicated per MAR with Zofran with positive results. C-pap in place. Pt updated on POC. All needs met at this time. Call light within reach. Bed alarm and hourly rounding in place.

## 2017-02-02 NOTE — OR SURGEON
Immediate Post-Operative Note      PreOp Diagnosis: spinal stenosis  PostOp Diagnosis: spinal stenosis    Procedure(s):  LUMBAR LAMINECTOMY DISKECTOMY - L3-4, L4-5, L5-S1 - Wound Class: Clean with Drain  LUMBAR DECOMPRESSION - Wound Class: Clean with Drain  FORAMINOTOMY - Wound Class: Clean with Drain    Surgeon(s):  Shana Salamanca M.D.    Anesthesiologist/Type of Anesthesia:  Anesthesiologist: Pooja Lay M.D./General    Surgical Staff:  Circulator: Sophie Paredes R.N.  Relief Circulator: Hannah Miles R.N.  Relief Scrub: Uri Drew Jr.  Scrub Person: Etelvina Canales  First Assist: Amaury Arcos PA-C  Radiology Technician: Andree Lopez    Assistants: Amaury Arcos PA-C,  Specimen: nil    Estimated Blood Loss: 100 cc    Findings: n/a    Complications: nil      2/1/2017 6:25 PM Shana Salamanca

## 2017-02-02 NOTE — OR NURSING
Pt via gurney, accompanied by transport, was transferred to [facility] at 2035. All personal belongings sent with Pt.

## 2017-02-02 NOTE — OP REPORT
DATE OF SERVICE:  02/01/2017    SURGEON:  Shana Salamanca MD    ASSISTANT:  Oh Arcos PA-C.    ANESTHESIOLOGIST:  Pooja Lay MD.    TYPE OF ANESTHESIA:  General anesthesia with endotracheal intubation.    PREOPERATIVE DIAGNOSES:  1.  Lumbar spinal stenosis at L3-L4, L4-L5, and L5-S1 with failed conservative   therapy.  2.  Significant comorbidity with pulmonary hypertension.    POSTOPERATIVE DIAGNOSES:  1.  Lumbar spinal stenosis at L3-L4, L4-L5, and L5-S1 with failed conservative   therapy.  2.  Significant comorbidity with pulmonary hypertension.    INDICATIONS:  The patient is a very nice 81-year-old man who is a .  He   has been struggling with pain many years.  He has some right knee pain.  He   has some left hip pain and this is mainly arthritic.  When he is sitting, he   is good.  If he stands up, he gets right buttock, posterior thigh and back   pain.  He has a positive shopping cart sign, basically it is right-sided pain.    He has significant comorbidities of sleep apnea, pulmonary hypertension.    His preoperative physical examination revealed reduced range of motion in the   lumbar spine.  He was using a cane for ambulation.  He had stenosis   preoperatively with surgical decompression.  The risks, benefits, alternatives   outlined in the consultation note.  He consented to surgery.    TITLE OF PROCEDURE:  1.  L3-L4, L4-L5, and L5-S1 decompressive laminectomy and bilateral   foraminotomies.  2.  Microscopic microdissection.    OPERATIVE FINDINGS:  There was marked facet arthropathy and _____ facet   arthropathy, ligamentous hypertrophy causing lateral recess stenosis.  This   was worse on the right-side at L4-L5.    OPERATIVE DETAILS:  After fully informed consent, the patient was brought to   the operating room at [facility].  General anesthesia was   administered.  He was given intravenous antibiotic.  He was carefully turned   prone on the OSI operating table  in Ruben frame.  All pressure points padded.    Posterior lumbar region was prepped and draped in a standard fashion.    Midline incision was then affected over the spinous processes from L3-S1.  A   bilateral subperiosteal exposure was affected.  Repeat x-ray was taken for   confirmation of level.  A laminectomy was then affected initially with Leksell   rongeurs, then under the microscope using an AM-8 drill bit, then a   combination of 4, 3 and 2 mm Kerrison punches.  After thinning down the   lamina, the remaining disk ligament and bone was taken.  The laminectomy   involved the inferior two-thirds of L3, all of L4, all of L5 and a portion of   S1.  Under the microscope, the lateral recess is decompressed with 2 and 3 mm   Kerrison punches.  Stenosis was worse on the right side at L4-L5 and I   sequentially followed the L3, L4, L5 and S1 nerve roots and decompressed them.    Hemostasis obtained.  All the nerve roots were free.  Closure was then   affected after meticulous hemostasis using multiple interrupted Vicryl sutures   over suction subfascial Hemovac.  Two paraspinal On-Q pain catheters were   also placed.  The wound was closed finally with staples and a dressing was   applied.    ESTIMATED BLOOD LOSS:  100 mL.    The surgery went well.  We will try to get him home tomorrow or Friday.  He   should get relief of his leg symptoms.       ____________________________________     MD SAE GAYTAN / EDU    DD:  02/01/2017 18:30:28  DT:  02/01/2017 22:21:59    D#:  [id number]  Job#:  [id number]    cc: [facility]

## 2017-02-02 NOTE — OR NURSING
Pt AA/Ox4. VSS. Dressing to back, CDI. CMS+ Pt reports 4/10 pain scale. Pain medications given. On-Q pump in place. PCA-Morphine set-up. Pt denies numbness or tingling. Pt moves all extremities. Pt denies nausea or vomiting. TEDs and SCDs in place. Report given to BUZZ Rojas. Awaiting transport. Pt's wife updated.

## 2017-02-03 VITALS
DIASTOLIC BLOOD PRESSURE: 61 MMHG | SYSTOLIC BLOOD PRESSURE: 119 MMHG | OXYGEN SATURATION: 94 % | HEIGHT: 69 IN | RESPIRATION RATE: 18 BRPM | TEMPERATURE: 97.8 F | WEIGHT: 220.46 LBS | BODY MASS INDEX: 32.65 KG/M2 | HEART RATE: 83 BPM

## 2017-02-03 PROBLEM — M48.061 SPINAL STENOSIS, LUMBAR REGION, WITHOUT NEUROGENIC CLAUDICATION: Status: RESOLVED | Noted: 2017-02-01 | Resolved: 2017-02-03

## 2017-02-03 LAB
ANION GAP SERPL CALC-SCNC: 7 MMOL/L (ref 0–11.9)
BUN SERPL-MCNC: 10 MG/DL (ref 8–22)
CALCIUM SERPL-MCNC: 8.8 MG/DL (ref 8.5–10.5)
CHLORIDE SERPL-SCNC: 105 MMOL/L (ref 96–112)
CO2 SERPL-SCNC: 25 MMOL/L (ref 20–33)
CREAT SERPL-MCNC: 0.51 MG/DL (ref 0.5–1.4)
ERYTHROCYTE [DISTWIDTH] IN BLOOD BY AUTOMATED COUNT: 49.3 FL (ref 35.9–50)
GFR SERPL CREATININE-BSD FRML MDRD: >60 ML/MIN/1.73 M 2
GLUCOSE SERPL-MCNC: 106 MG/DL (ref 65–99)
HCT VFR BLD AUTO: 36.1 % (ref 42–52)
HGB BLD-MCNC: 11.8 G/DL (ref 14–18)
MCH RBC QN AUTO: 30.1 PG (ref 27–33)
MCHC RBC AUTO-ENTMCNC: 32.7 G/DL (ref 33.7–35.3)
MCV RBC AUTO: 92.1 FL (ref 81.4–97.8)
PLATELET # BLD AUTO: 214 K/UL (ref 164–446)
PMV BLD AUTO: 9.1 FL (ref 9–12.9)
POTASSIUM SERPL-SCNC: 4.2 MMOL/L (ref 3.6–5.5)
RBC # BLD AUTO: 3.92 M/UL (ref 4.7–6.1)
SODIUM SERPL-SCNC: 137 MMOL/L (ref 135–145)
WBC # BLD AUTO: 12 K/UL (ref 4.8–10.8)

## 2017-02-03 PROCEDURE — 85027 COMPLETE CBC AUTOMATED: CPT

## 2017-02-03 PROCEDURE — 94660 CPAP INITIATION&MGMT: CPT

## 2017-02-03 PROCEDURE — 80048 BASIC METABOLIC PNL TOTAL CA: CPT

## 2017-02-03 PROCEDURE — A9270 NON-COVERED ITEM OR SERVICE: HCPCS | Performed by: PHYSICIAN ASSISTANT

## 2017-02-03 PROCEDURE — 36415 COLL VENOUS BLD VENIPUNCTURE: CPT

## 2017-02-03 PROCEDURE — 700112 HCHG RX REV CODE 229: Performed by: PHYSICIAN ASSISTANT

## 2017-02-03 PROCEDURE — G0378 HOSPITAL OBSERVATION PER HR: HCPCS

## 2017-02-03 PROCEDURE — 700102 HCHG RX REV CODE 250 W/ 637 OVERRIDE(OP): Performed by: PHYSICIAN ASSISTANT

## 2017-02-03 RX ORDER — CEPHALEXIN 500 MG/1
500 CAPSULE ORAL 4 TIMES DAILY
Qty: 20 CAP | Refills: 0 | Status: SHIPPED | OUTPATIENT
Start: 2017-02-03 | End: 2017-09-21

## 2017-02-03 RX ORDER — OXYCODONE HYDROCHLORIDE AND ACETAMINOPHEN 5; 325 MG/1; MG/1
1-2 TABLET ORAL EVERY 4 HOURS PRN
Qty: 90 TAB | Refills: 0 | Status: SHIPPED | OUTPATIENT
Start: 2017-02-03 | End: 2017-09-21

## 2017-02-03 RX ORDER — METHOCARBAMOL 750 MG/1
750 TABLET, FILM COATED ORAL EVERY 8 HOURS PRN
Qty: 90 TAB | Refills: 0 | Status: SHIPPED | OUTPATIENT
Start: 2017-02-03 | End: 2017-09-21

## 2017-02-03 RX ADMIN — METHOCARBAMOL 750 MG: 750 TABLET ORAL at 09:53

## 2017-02-03 RX ADMIN — AMLODIPINE BESYLATE 5 MG: 5 TABLET ORAL at 09:00

## 2017-02-03 RX ADMIN — TAMSULOSIN HYDROCHLORIDE 0.4 MG: 0.4 CAPSULE ORAL at 09:54

## 2017-02-03 RX ADMIN — HYDROCODONE BITARTRATE AND ACETAMINOPHEN 1 TABLET: 5; 325 TABLET ORAL at 09:54

## 2017-02-03 RX ADMIN — METOPROLOL SUCCINATE 25 MG: 25 TABLET, EXTENDED RELEASE ORAL at 09:54

## 2017-02-03 RX ADMIN — OXYCODONE HYDROCHLORIDE AND ACETAMINOPHEN 1 TABLET: 5; 325 TABLET ORAL at 04:34

## 2017-02-03 RX ADMIN — METHOCARBAMOL 750 MG: 750 TABLET ORAL at 04:34

## 2017-02-03 RX ADMIN — DOCUSATE SODIUM 100 MG: 100 CAPSULE ORAL at 09:54

## 2017-02-03 ASSESSMENT — PAIN SCALES - GENERAL: PAINLEVEL_OUTOF10: 8

## 2017-02-03 NOTE — DISCHARGE INSTRUCTIONS
Discharge Instructions    Discharged to home by car with relative. Discharged via wheelchair, hospital escort: Yes.  Special equipment needed: Not Applicable    Be sure to schedule a follow-up appointment with your primary care doctor or any specialists as instructed.     Discharge Plan:        I understand that a diet low in cholesterol, fat, and sodium is recommended for good health. Unless I have been given specific instructions below for another diet, I accept this instruction as my diet prescription.   Other diet: Regular    Incision Care  An incision is when a surgeon cuts into your body. After surgery, the incision needs to be cared for properly to prevent infection.   HOW TO CARE FOR YOUR INCISION  · Take medicines only as directed by your health care provider.  · There are many different ways to close and cover an incision, including stitches, skin glue, and adhesive strips. Follow your health care provider's instructions on:  ¨ Incision care.  ¨ Bandage (dressing) changes and removal.  ¨ Incision closure removal.  · Do not take baths, swim, or use a hot tub until your health care provider approves. You may shower as directed by your health care provider.  · Resume your normal diet and activities as directed.  · Use anti-itch medicine (such as an antihistamine) as directed by your health care provider. The incision may itch while it is healing. Do not pick or scratch at the incision.  · Drink enough fluid to keep your urine clear or pale yellow.  SEEK MEDICAL CARE IF:   · You have drainage, redness, swelling, or pain at your incision site.  · You have muscle aches, chills, or a general ill feeling.  · You notice a bad smell coming from the incision or dressing.  · Your incision edges separate after the sutures, staples, or skin adhesive strips have been removed.  · You have persistent nausea or vomiting.  · You have a fever.  · You are dizzy.  SEEK IMMEDIATE MEDICAL CARE IF:   · You have a rash.  · You  faint.  · You have difficulty breathing.  MAKE SURE YOU:   · Understand these instructions.  · Will watch your condition.  · Will get help right away if you are not doing well or get worse.     This information is not intended to replace advice given to you by your health care provider. Make sure you discuss any questions you have with your health care provider.     Document Released: 07/07/2006 Document Revised: 01/08/2016 Document Reviewed: 02/11/2015  [employer] Interactive Patient Education ©2016 [employer]      Special Instructions: Discharge instructions for the Orthopedic Patient    Follow up with Primary Care Physician within 2 weeks of discharge to home, regarding:  Review of medications and diagnostic testing.  Surveillance for medical complications.  Workup and treatment of osteoporosis, if appropriate.     -Is this a Joint Replacement patient? No    -Is this patient being discharged with medication to prevent blood clots?  No    · Is patient discharged on Warfarin / Coumadin?   No     · Is patient Post Blood Transfusion?  No    Depression / Suicide Risk    As you are discharged from this [facility] facility, it is important to learn how to keep safe from harming yourself.    Recognize the warning signs:  · Abrupt changes in personality, positive or negative- including increase in energy   · Giving away possessions  · Change in eating patterns- significant weight changes-  positive or negative  · Change in sleeping patterns- unable to sleep or sleeping all the time   · Unwillingness or inability to communicate  · Depression  · Unusual sadness, discouragement and loneliness  · Talk of wanting to die  · Neglect of personal appearance   · Rebelliousness- reckless behavior  · Withdrawal from people/activities they love  · Confusion- inability to concentrate     If you or a loved one observes any of these behaviors or has concerns about self-harm, here's what you can do:  · Talk about it- your feelings and  reasons for harming yourself  · Remove any means that you might use to hurt yourself (examples: pills, rope, extension cords, firearm)  · Get professional help from the community (Mental Health, Substance Abuse, psychological counseling)  · Do not be alone:Call your Safe Contact- someone whom you trust who will be there for you.  · Call your local CRISIS HOTLINE 606-7425 or 563-488-8015  · Call your local Children's Mobile Crisis Response Team [place] (780) 605-3142 or www.Warp 9  · Call the toll free National Suicide Prevention Hotlines   · National Suicide Prevention Lifeline 092-543-GWUU (9941)  · National Hope Line Network 800-SUICIDE (517-7066)

## 2017-02-03 NOTE — PROGRESS NOTES
Paged dr. Salamanca's on call to receive orders for pt to use at home C-pap.   Amaury THOMPSON returned phone call at [phone]. Per Amaury, it is okay for pt to use at home c-pap machine.

## 2017-02-03 NOTE — PROGRESS NOTES
Received report from Polly GERBER. Pt is A&Ox4. Pt denies any n/v, n/t. Pt states pain is a 0-1/10. Pt.'s pain increases with ambulation. Medicated appropriately per MAR with positive results. Pt updated on POC. All needs met at this time. Call light within reach. Bed alarm and hourly rounding in place.

## 2017-02-03 NOTE — PROGRESS NOTES
"Neurosurgery  POD# 2  Ambulating  Voiding  Tolerating oral medications  Denies nausea, vomiting, headache, some N/V overnight  Pain controlled on current medication regimen-percocet  Leg symptoms improved    Objective:  Blood pressure 132/80, pulse 77, temperature 36.8 °C (98.3 °F), resp. rate 16, height 1.753 m (5' 9\"), weight 100 kg (220 lb 7.4 oz), SpO2 95 %.    Intake/Output Summary (Last 24 hours) at 02/02/17 0919  Last data filed at 02/02/17 0715   Gross per 24 hour   Intake   1442 ml   Output    285 ml   Net   1157 ml       Recent Labs      02/02/17   0206  02/03/17   0254   WBC  10.2  12.0*   RBC  4.17*  3.92*   HEMOGLOBIN  12.7*  11.8*   HEMATOCRIT  38.8*  36.1*   MCV  93.0  92.1   MCH  30.5  30.1   MCHC  32.7*  32.7*   RDW  50.1*  49.3   PLATELETCT  209  214   MPV  9.0  9.1     Recent Labs      02/02/17   0206  02/03/17   0254   SODIUM  131*  137   POTASSIUM  4.3  4.2   CHLORIDE  101  105   CO2  21  25   GLUCOSE  189*  106*   BUN  10  10         VSS  LS  Hemovac 60cc/8hr am  Surgical incision clean, dry, intact, no evidence of infection  Strength:  Lower extremities are 5/5 grossly  Otherwise neurologically intact    Assessment:  Active Hospital Problems    Diagnosis   • Spinal stenosis, lumbar region, without neurogenic claudication [M48.06]         Plan:  1. Ambulate with PT/OT as tolerated  2. Hemovac off of suction  3. D/C hemovac and OnQ pump at 1400hrs  4. D/C home at 1400hrs  5. Incentive spirometry today, D/C home with IS as well  "

## 2017-02-03 NOTE — RESPIRATORY CARE
COPD EDUCATION by COPD CLINICAL EDUCATOR  2/3/2017 at 7:11 AM by Lena Fajardo     Patient reviewed by COPD education team. Patient does not qualify for COPD program.

## 2017-02-03 NOTE — FACE TO FACE
Face to Face Note  -  Durable Medical Equipment    Amaury Arcos PA-C - NPI: [id number]  I certify that this patient is under my care and that they had a durable medical equipment(DME)face to face encounter by myself that meets the physician DME face-to-face encounter requirements with this patient on:    Date of encounter:   Patient:                    MRN:                       YOB: 2016  Jamil Kendall  [id number]  [id number]     The encounter with the patient was in whole, or in part, for the following medical condition, which is the primary reason for durable medical equipment:  Post-Op Surgery    I certify that, based on my findings, the following durable medical equipment is medically necessary:  Walkers.    HOME O2 Saturation Measurements:(Values must be present for Home Oxygen orders)         ,     ,         My Clinical findings support the need for the above equipment due to:  Other - fall risk    Supporting Symptoms: slow to mobilize. Fall risk

## 2017-02-04 NOTE — DISCHARGE SUMMARY
DISCHARGE DIAGNOSES:  1.  Status post L3-S1 decompressive laminectomy and bilateral foraminotomies.  2.  Lumbar spinal stenosis at L3-L4, L4-L5, L5-S1.  3.  Significant comorbidity with pulmonary hypertension.    OPERATION PERFORMED:  See above.    REASON FOR ADMISSION:  The patient is a pleasant 81-year-old man who is known   to our office.  He has been dealing with right buttock and posterior thigh   pain.  This seems to be okay when he is sitting, but the pain seems to   progress as he stands and ambulates.  He had a preoperative physical exam,   which showed reduced range of motion of lumbar spine.  He is using a cane for   ambulation.  He underwent conservative therapies including physical therapy,   medications and injections.  Unfortunately, his pain persisted.  MRI scan,   which showed the severe levels of stenosis mentioned above.  Due to these   findings with persistent symptoms and failure of conservative therapy, he was   offered the above-mentioned procedure and he did consent.    HOSPITAL COURSE:  The patient underwent the above operation and was   transferred to the neuroscience floor.  On postoperative day #1, his _____   switched from IV to oral analgesia.  He was eating, drinking, mobilizing, and   voiding.  His Hemovac drain was not ready to be discontinued and it was kept   another night.  On postoperative day #2, the Hemovac drain dried up nicely and   was discontinued.  He continues to meet the criteria for discharge and was   discharged to home in stable condition.    DISCHARGE INSTRUCTIONS:  He is to be extremely cautious with any bending,   flexing, twisting about the low back.  He may shower at this time, but is not   to submerge his wound until further instructed.  He has followup appointments   with our office in 2 and 6 weeks' time, and he has been instructed to keep   these.  He is to avoid nonsteroidal anti-inflammatory medications for 2 weeks   and to contact our office with any other  questions or concerns.    DISCHARGE MEDICATIONS:  1.  Percocet 5-325.  2.  Keflex 500 mg.  3.  Robaxin 750 mg.    I once again have answered all of his questions to the best of my ability.  He   is now again discharged to home in stable condition and is to call our office   with any questions or concerns.       ____________________________________     JANES LOPEZ / EDU    DD:  02/03/2017 08:30:06  DT:  02/04/2017 05:52:33    D#:  [id number]  Job#:  [id number]    cc: [facility]

## 2017-09-21 ENCOUNTER — APPOINTMENT (OUTPATIENT)
Dept: RADIOLOGY | Facility: MEDICAL CENTER | Age: 82
DRG: 329 | End: 2017-09-21
Attending: INTERNAL MEDICINE
Payer: COMMERCIAL

## 2017-09-21 ENCOUNTER — HOSPITAL ENCOUNTER (INPATIENT)
Facility: MEDICAL CENTER | Age: 82
LOS: 4 days | DRG: 329 | End: 2017-09-25
Attending: EMERGENCY MEDICINE | Admitting: INTERNAL MEDICINE
Payer: COMMERCIAL

## 2017-09-21 ENCOUNTER — RESOLUTE PROFESSIONAL BILLING HOSPITAL PROF FEE (OUTPATIENT)
Dept: MEDSURG UNIT | Facility: MEDICAL CENTER | Age: 82
End: 2017-09-21
Payer: COMMERCIAL

## 2017-09-21 ENCOUNTER — APPOINTMENT (OUTPATIENT)
Dept: RADIOLOGY | Facility: MEDICAL CENTER | Age: 82
DRG: 329 | End: 2017-09-21
Attending: EMERGENCY MEDICINE
Payer: COMMERCIAL

## 2017-09-21 DIAGNOSIS — Z93.2 S/P ILEOSTOMY (HCC): ICD-10-CM

## 2017-09-21 DIAGNOSIS — Z90.49 S/P TOTAL COLECTOMY: ICD-10-CM

## 2017-09-21 DIAGNOSIS — K57.93 GASTROINTESTINAL HEMORRHAGE ASSOCIATED WITH INTESTINAL DIVERTICULITIS: ICD-10-CM

## 2017-09-21 PROBLEM — K92.2 GI BLEED: Status: ACTIVE | Noted: 2017-09-21

## 2017-09-21 LAB
ABO GROUP BLD: NORMAL
ABO GROUP BLD: NORMAL
ALBUMIN SERPL BCP-MCNC: 1.9 G/DL (ref 3.2–4.9)
ALBUMIN/GLOB SERPL: 1.2 G/DL
ALP SERPL-CCNC: 29 U/L (ref 30–99)
ALT SERPL-CCNC: 16 U/L (ref 2–50)
ANION GAP SERPL CALC-SCNC: 4 MMOL/L (ref 0–11.9)
APTT PPP: 28.5 SEC (ref 24.7–36)
APTT PPP: 29 SEC (ref 24.7–36)
AST SERPL-CCNC: 13 U/L (ref 12–45)
BARCODED ABORH UBTYP: 1700
BARCODED ABORH UBTYP: 5100
BARCODED ABORH UBTYP: 6200
BARCODED ABORH UBTYP: 9500
BARCODED PRD CODE UBPRD: NORMAL
BARCODED UNIT NUM UBUNT: NORMAL
BASE EXCESS BLDA CALC-SCNC: -3 MMOL/L (ref -4–3)
BASE EXCESS BLDA CALC-SCNC: -9 MMOL/L (ref -4–3)
BASOPHILS # BLD AUTO: 0.2 % (ref 0–1.8)
BASOPHILS # BLD: 0.03 K/UL (ref 0–0.12)
BILIRUB SERPL-MCNC: 0.4 MG/DL (ref 0.1–1.5)
BLD GP AB SCN SERPL QL: NORMAL
BODY TEMPERATURE: ABNORMAL DEGREES
BODY TEMPERATURE: ABNORMAL DEGREES
BUN SERPL-MCNC: 10 MG/DL (ref 8–22)
CALCIUM SERPL-MCNC: 4.8 MG/DL (ref 8.5–10.5)
CFT BLD TEG: 2.9 MIN (ref 5–10)
CHLORIDE SERPL-SCNC: 117 MMOL/L (ref 96–112)
CLOT ANGLE BLD TEG: 71 DEGREES (ref 53–72)
CLOT LYSIS 30M P MA LENFR BLD TEG: 0 % (ref 0–8)
CO2 BLDA-SCNC: 20 MMOL/L (ref 20–33)
CO2 BLDA-SCNC: 24 MMOL/L (ref 20–33)
CO2 SERPL-SCNC: 13 MMOL/L (ref 20–33)
COMPONENT FT 8504FT: NORMAL
COMPONENT P 8504P: NORMAL
COMPONENT P 8504P: NORMAL
COMPONENT R 8504R: NORMAL
CREAT SERPL-MCNC: 0.41 MG/DL (ref 0.5–1.4)
CT.EXTRINSIC BLD ROTEM: 1.3 MIN (ref 1–3)
EOSINOPHIL # BLD AUTO: 0.11 K/UL (ref 0–0.51)
EOSINOPHIL NFR BLD: 0.9 % (ref 0–6.9)
ERYTHROCYTE [DISTWIDTH] IN BLOOD BY AUTOMATED COUNT: 53.9 FL (ref 35.9–50)
ERYTHROCYTE [DISTWIDTH] IN BLOOD BY AUTOMATED COUNT: 59.3 FL (ref 35.9–50)
GFR SERPL CREATININE-BSD FRML MDRD: >60 ML/MIN/1.73 M 2
GLOBULIN SER CALC-MCNC: 1.6 G/DL (ref 1.9–3.5)
GLUCOSE SERPL-MCNC: 188 MG/DL (ref 65–99)
HCO3 BLDA-SCNC: 18.4 MMOL/L (ref 17–25)
HCO3 BLDA-SCNC: 23 MMOL/L (ref 17–25)
HCT VFR BLD AUTO: 28.3 % (ref 42–52)
HCT VFR BLD AUTO: 31.9 % (ref 42–52)
HGB BLD-MCNC: 10.8 G/DL (ref 14–18)
HGB BLD-MCNC: 8.5 G/DL (ref 14–18)
IMM GRANULOCYTES # BLD AUTO: 0.16 K/UL (ref 0–0.11)
IMM GRANULOCYTES NFR BLD AUTO: 1.3 % (ref 0–0.9)
INR PPP: 1.1 (ref 0.87–1.13)
INR PPP: 1.47 (ref 0.87–1.13)
LACTATE BLD-SCNC: 2 MMOL/L (ref 0.5–2)
LYMPHOCYTES # BLD AUTO: 1.86 K/UL (ref 1–4.8)
LYMPHOCYTES NFR BLD: 15 % (ref 22–41)
MCF BLD TEG: 65.4 MM (ref 50–70)
MCH RBC QN AUTO: 30.1 PG (ref 27–33)
MCH RBC QN AUTO: 30.1 PG (ref 27–33)
MCHC RBC AUTO-ENTMCNC: 30 G/DL (ref 33.7–35.3)
MCHC RBC AUTO-ENTMCNC: 33.9 G/DL (ref 33.7–35.3)
MCV RBC AUTO: 100.4 FL (ref 81.4–97.8)
MCV RBC AUTO: 88.9 FL (ref 81.4–97.8)
MONOCYTES # BLD AUTO: 0.81 K/UL (ref 0–0.85)
MONOCYTES NFR BLD AUTO: 6.5 % (ref 0–13.4)
NEUTROPHILS # BLD AUTO: 9.43 K/UL (ref 1.82–7.42)
NEUTROPHILS NFR BLD: 76.1 % (ref 44–72)
NRBC # BLD AUTO: 0 K/UL
NRBC BLD AUTO-RTO: 0 /100 WBC
O2/TOTAL GAS SETTING VFR VENT: 100 %
O2/TOTAL GAS SETTING VFR VENT: 5 %
PA AA BLD-ACNC: 51.6 %
PA ADP BLD-ACNC: 57.3 %
PCO2 BLDA: 41.9 MMHG (ref 26–37)
PCO2 BLDA: 47.7 MMHG (ref 26–37)
PCO2 TEMP ADJ BLDA: 38.2 MMHG (ref 26–37)
PCO2 TEMP ADJ BLDA: 45.6 MMHG (ref 26–37)
PH BLDA: 7.19 [PH] (ref 7.4–7.5)
PH BLDA: 7.35 [PH] (ref 7.4–7.5)
PH TEMP ADJ BLDA: 7.21 [PH] (ref 7.4–7.5)
PH TEMP ADJ BLDA: 7.38 [PH] (ref 7.4–7.5)
PLATELET # BLD AUTO: 154 K/UL (ref 164–446)
PLATELET # BLD AUTO: 170 K/UL (ref 164–446)
PMV BLD AUTO: 9 FL (ref 9–12.9)
PMV BLD AUTO: 9.1 FL (ref 9–12.9)
PO2 BLDA: 60 MMHG (ref 64–87)
PO2 BLDA: 69 MMHG (ref 64–87)
PO2 TEMP ADJ BLDA: 52 MMHG (ref 64–87)
PO2 TEMP ADJ BLDA: 64 MMHG (ref 64–87)
POTASSIUM SERPL-SCNC: 5.2 MMOL/L (ref 3.6–5.5)
PRODUCT TYPE UPROD: NORMAL
PROT SERPL-MCNC: 3.5 G/DL (ref 6–8.2)
PROTHROMBIN TIME: 14.6 SEC (ref 12–14.6)
PROTHROMBIN TIME: 18.3 SEC (ref 12–14.6)
RBC # BLD AUTO: 2.82 M/UL (ref 4.7–6.1)
RBC # BLD AUTO: 3.59 M/UL (ref 4.7–6.1)
RH BLD: NORMAL
SAO2 % BLDA: 89 % (ref 93–99)
SAO2 % BLDA: 89 % (ref 93–99)
SODIUM SERPL-SCNC: 134 MMOL/L (ref 135–145)
SPECIMEN DRAWN FROM PATIENT: ABNORMAL
SPECIMEN DRAWN FROM PATIENT: ABNORMAL
TEG ALGORITHM TGALG: ABNORMAL
TRIGL SERPL-MCNC: 93 MG/DL (ref 0–149)
TROPONIN I SERPL-MCNC: <0.01 NG/ML (ref 0–0.04)
UNIT STATUS USTAT: NORMAL
WBC # BLD AUTO: 12.4 K/UL (ref 4.8–10.8)
WBC # BLD AUTO: 19.3 K/UL (ref 4.8–10.8)

## 2017-09-21 PROCEDURE — 88307 TISSUE EXAM BY PATHOLOGIST: CPT

## 2017-09-21 PROCEDURE — 37799 UNLISTED PX VASCULAR SURGERY: CPT

## 2017-09-21 PROCEDURE — 160029 HCHG SURGERY MINUTES - 1ST 30 MINS LEVEL 4: Performed by: SURGERY

## 2017-09-21 PROCEDURE — 80053 COMPREHEN METABOLIC PANEL: CPT

## 2017-09-21 PROCEDURE — 36430 TRANSFUSION BLD/BLD COMPNT: CPT

## 2017-09-21 PROCEDURE — 85347 COAGULATION TIME ACTIVATED: CPT

## 2017-09-21 PROCEDURE — 700105 HCHG RX REV CODE 258: Performed by: EMERGENCY MEDICINE

## 2017-09-21 PROCEDURE — 700111 HCHG RX REV CODE 636 W/ 250 OVERRIDE (IP): Performed by: INTERNAL MEDICINE

## 2017-09-21 PROCEDURE — 501433 HCHG STAPLER, GIA MULTIFIRE 60/80: Performed by: SURGERY

## 2017-09-21 PROCEDURE — 700111 HCHG RX REV CODE 636 W/ 250 OVERRIDE (IP): Performed by: EMERGENCY MEDICINE

## 2017-09-21 PROCEDURE — 94002 VENT MGMT INPAT INIT DAY: CPT

## 2017-09-21 PROCEDURE — 85576 BLOOD PLATELET AGGREGATION: CPT | Mod: 91

## 2017-09-21 PROCEDURE — 36415 COLL VENOUS BLD VENIPUNCTURE: CPT

## 2017-09-21 PROCEDURE — 700111 HCHG RX REV CODE 636 W/ 250 OVERRIDE (IP)

## 2017-09-21 PROCEDURE — P9017 PLASMA 1 DONOR FRZ W/IN 8 HR: HCPCS

## 2017-09-21 PROCEDURE — 84478 ASSAY OF TRIGLYCERIDES: CPT

## 2017-09-21 PROCEDURE — 84295 ASSAY OF SERUM SODIUM: CPT | Mod: 91

## 2017-09-21 PROCEDURE — 86900 BLOOD TYPING SEROLOGIC ABO: CPT

## 2017-09-21 PROCEDURE — 36556 INSERT NON-TUNNEL CV CATH: CPT

## 2017-09-21 PROCEDURE — 700105 HCHG RX REV CODE 258

## 2017-09-21 PROCEDURE — 84484 ASSAY OF TROPONIN QUANT: CPT

## 2017-09-21 PROCEDURE — 85014 HEMATOCRIT: CPT

## 2017-09-21 PROCEDURE — 30233L1 TRANSFUSION OF NONAUTOLOGOUS FRESH PLASMA INTO PERIPHERAL VEIN, PERCUTANEOUS APPROACH: ICD-10-PCS | Performed by: EMERGENCY MEDICINE

## 2017-09-21 PROCEDURE — 83605 ASSAY OF LACTIC ACID: CPT

## 2017-09-21 PROCEDURE — 71010 DX-CHEST-PORTABLE (1 VIEW): CPT

## 2017-09-21 PROCEDURE — P9034 PLATELETS, PHERESIS: HCPCS

## 2017-09-21 PROCEDURE — 85027 COMPLETE CBC AUTOMATED: CPT

## 2017-09-21 PROCEDURE — 30233R1 TRANSFUSION OF NONAUTOLOGOUS PLATELETS INTO PERIPHERAL VEIN, PERCUTANEOUS APPROACH: ICD-10-PCS | Performed by: EMERGENCY MEDICINE

## 2017-09-21 PROCEDURE — 700101 HCHG RX REV CODE 250: Performed by: EMERGENCY MEDICINE

## 2017-09-21 PROCEDURE — 700105 HCHG RX REV CODE 258: Performed by: INTERNAL MEDICINE

## 2017-09-21 PROCEDURE — 96365 THER/PROPH/DIAG IV INF INIT: CPT

## 2017-09-21 PROCEDURE — 0DTE0ZZ RESECTION OF LARGE INTESTINE, OPEN APPROACH: ICD-10-PCS | Performed by: SURGERY

## 2017-09-21 PROCEDURE — 160048 HCHG OR STATISTICAL LEVEL 1-5: Performed by: SURGERY

## 2017-09-21 PROCEDURE — 96366 THER/PROPH/DIAG IV INF ADDON: CPT

## 2017-09-21 PROCEDURE — 502704 HCHG DEVICE, LIGASURE IMPACT: Performed by: SURGERY

## 2017-09-21 PROCEDURE — 30233N1 TRANSFUSION OF NONAUTOLOGOUS RED BLOOD CELLS INTO PERIPHERAL VEIN, PERCUTANEOUS APPROACH: ICD-10-PCS | Performed by: EMERGENCY MEDICINE

## 2017-09-21 PROCEDURE — P9016 RBC LEUKOCYTES REDUCED: HCPCS | Mod: 91

## 2017-09-21 PROCEDURE — A9270 NON-COVERED ITEM OR SERVICE: HCPCS | Performed by: INTERNAL MEDICINE

## 2017-09-21 PROCEDURE — 700101 HCHG RX REV CODE 250

## 2017-09-21 PROCEDURE — 700102 HCHG RX REV CODE 250 W/ 637 OVERRIDE(OP): Performed by: INTERNAL MEDICINE

## 2017-09-21 PROCEDURE — 160009 HCHG ANES TIME/MIN: Performed by: SURGERY

## 2017-09-21 PROCEDURE — 86901 BLOOD TYPING SEROLOGIC RH(D): CPT

## 2017-09-21 PROCEDURE — 0D1B0Z4 BYPASS ILEUM TO CUTANEOUS, OPEN APPROACH: ICD-10-PCS | Performed by: SURGERY

## 2017-09-21 PROCEDURE — 501452 HCHG STAPLES, GIA MULTIFIRE 60/80: Performed by: SURGERY

## 2017-09-21 PROCEDURE — 82947 ASSAY GLUCOSE BLOOD QUANT: CPT

## 2017-09-21 PROCEDURE — 501445 HCHG STAPLER, SKIN DISP: Performed by: SURGERY

## 2017-09-21 PROCEDURE — 770022 HCHG ROOM/CARE - ICU (200)

## 2017-09-21 PROCEDURE — 501838 HCHG SUTURE GENERAL: Performed by: SURGERY

## 2017-09-21 PROCEDURE — 96375 TX/PRO/DX INJ NEW DRUG ADDON: CPT

## 2017-09-21 PROCEDURE — 85025 COMPLETE CBC W/AUTO DIFF WBC: CPT

## 2017-09-21 PROCEDURE — 85384 FIBRINOGEN ACTIVITY: CPT

## 2017-09-21 PROCEDURE — 85730 THROMBOPLASTIN TIME PARTIAL: CPT

## 2017-09-21 PROCEDURE — 86850 RBC ANTIBODY SCREEN: CPT

## 2017-09-21 PROCEDURE — C1751 CATH, INF, PER/CENT/MIDLINE: HCPCS

## 2017-09-21 PROCEDURE — 700117 HCHG RX CONTRAST REV CODE 255: Performed by: EMERGENCY MEDICINE

## 2017-09-21 PROCEDURE — 82803 BLOOD GASES ANY COMBINATION: CPT

## 2017-09-21 PROCEDURE — C9113 INJ PANTOPRAZOLE SODIUM, VIA: HCPCS | Performed by: INTERNAL MEDICINE

## 2017-09-21 PROCEDURE — 85610 PROTHROMBIN TIME: CPT

## 2017-09-21 PROCEDURE — 160041 HCHG SURGERY MINUTES - EA ADDL 1 MIN LEVEL 4: Performed by: SURGERY

## 2017-09-21 PROCEDURE — 99291 CRITICAL CARE FIRST HOUR: CPT

## 2017-09-21 PROCEDURE — 96368 THER/DIAG CONCURRENT INF: CPT

## 2017-09-21 PROCEDURE — 5A1935Z RESPIRATORY VENTILATION, LESS THAN 24 CONSECUTIVE HOURS: ICD-10-PCS | Performed by: EMERGENCY MEDICINE

## 2017-09-21 PROCEDURE — 74174 CTA ABD&PLVS W/CONTRAST: CPT

## 2017-09-21 PROCEDURE — 84132 ASSAY OF SERUM POTASSIUM: CPT | Mod: 91

## 2017-09-21 PROCEDURE — 82330 ASSAY OF CALCIUM: CPT

## 2017-09-21 PROCEDURE — 36600 WITHDRAWAL OF ARTERIAL BLOOD: CPT

## 2017-09-21 PROCEDURE — 71010 DX-CHEST-LIMITED (1 VIEW): CPT

## 2017-09-21 RX ORDER — IPRATROPIUM BROMIDE AND ALBUTEROL SULFATE 2.5; .5 MG/3ML; MG/3ML
3 SOLUTION RESPIRATORY (INHALATION)
Status: DISCONTINUED | OUTPATIENT
Start: 2017-09-21 | End: 2017-09-25 | Stop reason: HOSPADM

## 2017-09-21 RX ORDER — POLYETHYLENE GLYCOL 3350 17 G/17G
1 POWDER, FOR SOLUTION ORAL
Status: DISCONTINUED | OUTPATIENT
Start: 2017-09-21 | End: 2017-09-25 | Stop reason: HOSPADM

## 2017-09-21 RX ORDER — GABAPENTIN 100 MG/1
300 CAPSULE ORAL 3 TIMES DAILY
COMMUNITY
End: 2020-07-30

## 2017-09-21 RX ORDER — CALCIUM CHLORIDE 100 MG/ML
1 INJECTION INTRAVENOUS; INTRAVENTRICULAR ONCE
Status: COMPLETED | OUTPATIENT
Start: 2017-09-21 | End: 2017-09-21

## 2017-09-21 RX ORDER — RANITIDINE 150 MG/1
150 TABLET ORAL 2 TIMES DAILY
COMMUNITY
End: 2020-07-21

## 2017-09-21 RX ORDER — LIDOCAINE HYDROCHLORIDE 10 MG/ML
1-2 INJECTION, SOLUTION INFILTRATION; PERINEURAL
Status: DISCONTINUED | OUTPATIENT
Start: 2017-09-21 | End: 2017-09-23

## 2017-09-21 RX ORDER — AMOXICILLIN 250 MG
2 CAPSULE ORAL 2 TIMES DAILY
Status: DISCONTINUED | OUTPATIENT
Start: 2017-09-21 | End: 2017-09-25 | Stop reason: HOSPADM

## 2017-09-21 RX ORDER — CALCIUM GLUCONATE 94 MG/ML
1 INJECTION, SOLUTION INTRAVENOUS ONCE
Status: DISCONTINUED | OUTPATIENT
Start: 2017-09-21 | End: 2017-09-21

## 2017-09-21 RX ORDER — MELOXICAM 7.5 MG/1
15 TABLET ORAL DAILY
COMMUNITY
End: 2020-07-21

## 2017-09-21 RX ORDER — CHLORHEXIDINE GLUCONATE ORAL RINSE 1.2 MG/ML
15 SOLUTION DENTAL 2 TIMES DAILY
Status: DISCONTINUED | OUTPATIENT
Start: 2017-09-21 | End: 2017-09-23

## 2017-09-21 RX ORDER — CAPSAICIN 0.025 %
1 CREAM (GRAM) TOPICAL 4 TIMES DAILY
Status: ON HOLD | COMMUNITY
End: 2020-07-27

## 2017-09-21 RX ORDER — BISACODYL 10 MG
10 SUPPOSITORY, RECTAL RECTAL
Status: DISCONTINUED | OUTPATIENT
Start: 2017-09-21 | End: 2017-09-25 | Stop reason: HOSPADM

## 2017-09-21 RX ORDER — LISINOPRIL 5 MG/1
5 TABLET ORAL DAILY
Status: ON HOLD | COMMUNITY
End: 2017-09-25

## 2017-09-21 RX ORDER — NOREPINEPHRINE BITARTRATE 1 MG/ML
INJECTION, SOLUTION INTRAVENOUS
Status: DISPENSED
Start: 2017-09-21 | End: 2017-09-21

## 2017-09-21 RX ORDER — SODIUM CHLORIDE 9 MG/ML
INJECTION, SOLUTION INTRAVENOUS CONTINUOUS
Status: DISCONTINUED | OUTPATIENT
Start: 2017-09-21 | End: 2017-09-25 | Stop reason: HOSPADM

## 2017-09-21 RX ADMIN — CALCIUM CHLORIDE 1 G: 100 INJECTION INTRAVENOUS; INTRAVENTRICULAR at 12:18

## 2017-09-21 RX ADMIN — PROPOFOL 25 MCG/KG/MIN: 10 INJECTION, EMULSION INTRAVENOUS at 16:14

## 2017-09-21 RX ADMIN — CALCIUM GLUCONATE 1 G: 94 INJECTION, SOLUTION INTRAVENOUS at 18:23

## 2017-09-21 RX ADMIN — CHLORHEXIDINE GLUCONATE 15 ML: 1.2 RINSE ORAL at 22:19

## 2017-09-21 RX ADMIN — IOHEXOL 100 ML: 350 INJECTION, SOLUTION INTRAVENOUS at 10:54

## 2017-09-21 RX ADMIN — NOREPINEPHRINE BITARTRATE 5 MCG/MIN: 1 INJECTION INTRAVENOUS at 11:45

## 2017-09-21 RX ADMIN — NOREPINEPHRINE BITARTRATE 10 MCG/MIN: 1 INJECTION INTRAVENOUS at 17:47

## 2017-09-21 RX ADMIN — SODIUM CHLORIDE 80 MG: 9 INJECTION, SOLUTION INTRAVENOUS at 17:58

## 2017-09-21 RX ADMIN — SODIUM CHLORIDE: 9 INJECTION, SOLUTION INTRAVENOUS at 17:31

## 2017-09-21 RX ADMIN — PROPOFOL 20 MCG/KG/MIN: 10 INJECTION, EMULSION INTRAVENOUS at 20:16

## 2017-09-21 RX ADMIN — PROPOFOL 10 MCG/KG/MIN: 10 INJECTION, EMULSION INTRAVENOUS at 10:33

## 2017-09-21 RX ADMIN — FENTANYL CITRATE 50 MCG: 50 INJECTION, SOLUTION INTRAMUSCULAR; INTRAVENOUS at 12:07

## 2017-09-21 ASSESSMENT — LIFESTYLE VARIABLES: REASON UNABLE TO ASSESS: INTUBATED

## 2017-09-21 ASSESSMENT — PAIN SCALES - GENERAL: PAINLEVEL_OUTOF10: ASSUMED PAIN PRESENT

## 2017-09-21 NOTE — CONSULTS
DATE OF CONSULTATION: 9/21/2017     REFERRING PHYSICIAN: MD Samm.     CONSULTING PHYSICIAN: Jamil Wagner M.D.      REASON FOR CONSULTATION: Massive lower GI bleed.     I have been asked by  to see the patient in surgical consultation   for evaluation of massive GI bleed    HISTORY OF PRESENT ILLNESS: The patient is a 82 year old man who [facility] with [facility]. [facility], recieved 3 units of PRBC with post-transfusion Hgb of 8. Currently on pressors, blood transfusing, remains hypotensive with BP in the 80's.      PAST MEDICAL HISTORY:  has a past medical history of Arthritis; Cancer (CMS-HCC); Cataract; Cold (12/27/2016); COPD (chronic obstructive pulmonary disease) (CMS-HCC); Coughing blood; Emphysema of lung (CMS-HCC); Heart murmur; Heart valve disease; Hiatus hernia syndrome; High cholesterol; Hypertension; Pain; Pneumonia (2014); Skin cancer (1990); Sleep apnea; and Snoring.     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     ALLERGIES: No Known Allergies     CURRENT MEDICATIONS:   Home Medications     Reviewed by Cindy Pruitt (Pharmacy Tech) on 09/21/17 at 1049  Med List Status: Complete   Medication Last Dose Status   amlodipine (NORVASC) 5 MG Tab 9/20/2017 Active   atorvastatin (LIPITOR) 20 MG Tab 9/20/2017 Active   capsaicin (ZOSTRIX) 0.025 % cream PRN Active   gabapentin (NEURONTIN) 100 MG Cap  Active   lisinopril (PRINIVIL) 5 MG Tab 9/20/2017 Active   meloxicam (MOBIC) 7.5 MG Tab 9/20/2017 Active   metformin (GLUCOPHAGE) 500 MG Tab 9/20/2017 Active   metoprolol (LOPRESSOR) 25 MG Tab  9/20/2017 Active   ranitidine (ZANTAC) 150 MG Tab 9/20/2017 Active   tamsulosin (FLOMAX) 0.4 MG capsule 9/20/2017 Active                FAMILY HISTORY: History reviewed. No pertinent family history.     SOCIAL HISTORY:   Social History     Social History Main Topics   • Smoking status: Former Smoker     Packs/day: 0.00     Years: 30.00     Types: Cigars     Quit date: 1/1/1989   • Smokeless tobacco: Never Used   • Alcohol use Yes      Comment: 3-4 scotch a night   • Drug use: No   • Sexual activity: Not on file       Review of Systems:      intubated    PHYSICAL EXAMINATION:       Constitutional:   Obese, Pale  HENMT:  Normocephalic, Atraumatic, Oropharynx moist mucous membranes, No oral exudates, Nose normal.  No thyromegaly.  Eyes:  EOMI, Conjunctiva normal, No discharge.  Neck:  Normal range of motion, No cervical tenderness,  no JVD.  Cardiovascular:  Normal heart rate, Normal rhythm, No murmurs, No rubs, No gallops.   Extremitites with intact distal pulses, no cyanosis, or edema.  Lungs:  Normal breath sounds, breath sounds clear to auscultation bilaterally,  no crackles, no wheezing.   Abdomen:disended   Skin: pale, cool    LABORATORY VALUES:   Recent Labs      09/21/17   1022   WBC  12.4*   RBC  2.82*   HEMOGLOBIN  8.5*   HEMATOCRIT  28.3*   MCV  100.4*   MCH  30.1   MCHC  30.0*   RDW  59.3*   PLATELETCT  154*   MPV  9.0     Recent Labs      09/21/17   1022   SODIUM  134*   POTASSIUM  5.2   CHLORIDE  117*   CO2  13*   GLUCOSE  188*   BUN  10   CREATININE  0.41*   CALCIUM  4.8*     Recent Labs      09/21/17   1022   ASTSGOT  13   ALTSGPT  16   TBILIRUBIN  0.4   ALKPHOSPHAT  29*   GLOBULIN  1.6*   INR  1.47*     Recent Labs      09/21/17   1022   APTT  29.0   INR  1.47*        IMAGING:   DX-CHEST-PORTABLE (1 VIEW)   Final Result      1.  Interval placement of multiple support devices.      2.  Bibasilar atelectasis.      3.  Cardiomegaly.      CTA ABDOMEN PELVIS W & W/O POST PROCESS   Final Result      No  active contrast extravasation is identified to suggest an active GI bleed at this time. However, evaluation of the stomach and small bowel is particularly limited by the presence of enteric contrast.      Mild narrowing of the proximal celiac trunk.      Colonic diverticulosis. Colon is fluid-filled which can be seen in the setting of diarrhea.      Atherosclerotic plaque.      Bibasilar opacities may represent atelectasis or consolidation.      Pleural calcification can be seen in the setting of prior asbestos exposure.            NM-GI BLEEDING SCAN    (Results Pending)   DX-CHEST-PORTABLE (1 VIEW)    (Results Pending)       IMPRESSION AND PLAN:     Active Hospital Problems    Diagnosis   • GI bleed [K92.2]     Massive GI bleed with associated hemorrhagic shock    Plan- To OR emergently for emergent subtotal colectomy  ____________________________________   Jamil Wagner M.D.          DD: 9/21/2017   DT: 11:58 AM

## 2017-09-21 NOTE — OP REPORT
09/21/17    OPERATIVE NOTE    PRE-OPERATIVE DIAGNOSIS - Massively lower GI bleed, hemorrhagic shock, diverticulosis    POST-OPERATIVE DIAGNOSIS -same    PROCEDURE PERFORMED -Emergent Subtotal colectomy with ileostomy, mobilization of splenic flexure    SURGEON - Jamil Wagner M.D.    ASSISTANT - none    TYPE OF ANESTHESIA - General     ANESTHESIOLOGIST  - Ron      SPECIMENS - Colon    INDICATIONS - 82 y.o. male who was transferred from the [facility] with a massive lower GI bleed. The patient is hemodynamically unstable with signs of hypovolemic hemorrhagic shock. The patient received multiple blood transfusions remains unstable. Patient being taken to the operating room for subtotal colectomy emergently      PROCEDURE IN DETAIL - the patient was taken to the operating suite placed in the supine position after adequate anesthesia patient's abdomen was prepped and draped in sterile fashion. An incision was made in the midline from above the umbilicus to the pubic symphysis. Perineal cavity was opened in standard fashion. Digital exploration of the perineal cavity demonstrated blood within the colon but no obvious pathology noted. Fixed retraction was established with the retractor. The left colon was mobilized along the white line of Toldt and the distal sigmoid colon was transected using a ADRIANA stapler. The left colon was mobilized and using a LigaSure device the mesentery of the colon was ligated. The splenic flexure of the colon was then taken down using the LigaSure and blunt dissection. The omentum was removed from the colon using electrocautery and the LigaSure device. The mesentery was taken down from the splenic flexure across the transverse colon and the hepatic flexure was then mobilized. The LigaSure device was used to continue to ligate the mesentery all the way down to the cecum. The terminal ileum was then transected using a ADRIANA stapler. Specimen was passed off. Next was irrigated with copious  amounts of normal saline and aspirated dry and hemostasis was assured. a circumferential incision was made in the right mid abdomen and using a muscle-splitting technique the ileum was brought up through that defect for the ostomy. next the omentum was then replaced over the bowel and the fascia was closed using a #1 strata fix suture. Skin was reapproximated using skin staples. Next the ostomy was matured in Juliann fashion the terminal ileum was opened and using 3-0 Vicryl sutures in Juliann fashion the ostomy was matured. An ostomy appliance was applied and sterile dressings were applied to the midline incision and the patient was taken intubated to the intensive care unit for further critical care management.      Jamil Wagner M.D.

## 2017-09-21 NOTE — ED NOTES
.  Chief Complaint   Patient presents with   • Lower GI Bleed     [facility] st[facility] onset 0430 am, brb per rectum. [facility]. Pt having worsening symptoms leading to intubation and placed on ventilator. Pt received 2 units PRBC pta, unit 3 infusing at this time.   Hypotensive on arrival. ERP to room orders recieved

## 2017-09-21 NOTE — ED PROVIDER NOTES
"ED Provider Note    CHIEF COMPLAINT  Chief Complaint   Patient presents with   • Lower GI Bleed       HPI  Jamil Kendall is a 82 y.o. male who presentsFor evaluation of hemorrhagic shock. The patient was transferred from the [facility]. Apparently presented there with some crampy lower abdominal pain and subsequent had a massive bright red blood stool. He became hypotensive and tachycardic and was subsequently intubated. His initial hemoglobin is 12.9 and they transfused 2 units prior to arrival here. On arrival patient is intubated and sedated hypotensive. He was receiving his 3rd unit of packed red cells. He apparently takes aspirin but no other anticoagulant. He has a history of diverticulosis based on colonoscopy 2-3 years ago. History is obtained primary from chart review and from the family    REVIEW OF SYSTEMS  See [facility] for further details. unavailable ll other systems are negative.     PAST MEDICAL HISTORY  Past Medical History:   Diagnosis Date   • Cold 12/27/2016   • Pneumonia 2014   • Skin cancer 1990   • Arthritis     back   • Cancer (CMS-HCC)    • Cataract     bilat IOL    • COPD (chronic obstructive pulmonary disease) (CMS-HCC)    • Coughing blood    • Emphysema of lung (CMS-HCC)    • Heart murmur    • Heart valve disease     \"leaky heart valve\"    • Hiatus hernia syndrome    • High cholesterol    • Hypertension    • Pain     back, right knees   • Sleep apnea     CPAP   • Snoring        FAMILY HISTORY  No history of bleeding disorder    SOCIAL HISTORY  Social History     Social History   • Marital status:      Spouse name: N/A   • Number of children: N/A   • Years of education: N/A     Social History Main Topics   • Smoking status: Former Smoker     Packs/day: 0.00     Years: 30.00     Types: Cigars     Quit date: 1/1/1989   • Smokeless tobacco: Never Used   • Alcohol use Yes      Comment: 2 drinks per day    • Drug use: No   • Sexual activity: Not on file     Other Topics " Concern   • Not on file     Social History Narrative   • No narrative on file   Former smoker no alcohol abuse     SURGICAL HISTORY  No abdominal surgical history      CURRENT MEDICATIONS    Current Facility-Administered Medications:   •  NOREPINEPHRINE BITARTRATE 1 MG/ML IV SOLN, , , ,   •  norepinephrine (LEVOPHED) 8 mg in  mL Infusion, 0-30 mcg/min, Intravenous, Continuous, Peter Singer M.D., Last Rate: 9.4 mL/hr at 09/21/17 1145, 5 mcg/min at 09/21/17 1145  •  Respiratory Care per Protocol, , Nebulization, Continuous RT, Jeremy M Gonda, M.D.  •  senna-docusate (PERICOLACE or SENOKOT S) 8.6-50 MG per tablet 2 Tab, 2 Tab, Oral, BID **AND** polyethylene glycol/lytes (MIRALAX) PACKET 1 Packet, 1 Packet, Oral, QDAY PRN **AND** magnesium hydroxide (MILK OF MAGNESIA) suspension 30 mL, 30 mL, Oral, QDAY PRN **AND** bisacodyl (DULCOLAX) suppository 10 mg, 10 mg, Rectal, QDAY PRN, Jeremy M Gonda, M.D.  •  chlorhexidine (PERIDEX) 0.12 % solution 15 mL, 15 mL, Mouth/Throat, BID, Jeremy M Gonda, M.D.  •  lidocaine (XYLOCAINE) 1%  injection, 1-2 mL, Tracheal Tube, Q30 MIN PRN, Jeremy M Gonda, M.D.  •  MD ALERT...Adult ICU Electrolyte Replacement per Pharmacy Protocol, , Other, pharmacy to dose, Jeremy M Gonda, M.D.  •  MD ALERT...Pharmacy to implement PROPOFOL CRITICAL CARE PROTOCOL 1 Each, 1 Each, Other, PRN, Jeremy M Gonda, M.D.  •  fentaNYL (SUBLIMAZE) injection 25 mcg, 25 mcg, Intravenous, Q HOUR PRN **OR** fentaNYL (SUBLIMAZE) injection 50 mcg, 50 mcg, Intravenous, Q HOUR PRN, 50 mcg at 09/21/17 1207 **OR** fentaNYL (SUBLIMAZE) injection 100 mcg, 100 mcg, Intravenous, Q HOUR PRN, Jeremy M Gonda, M.D.  •  ipratropium-albuterol (DUONEB) nebulizer solution 3 mL, 3 mL, Nebulization, Q2HRS PRN (RT), Jeremy M Gonda, M.D.  •  pantoprazole (PROTONIX) 80 mg in  mL IVPB, 80 mg, Intravenous, Once, Jeremy M Gonda, M.D.  •  pantoprazole (PROTONIX) 80 mg in  mL Infusion, 8 mg/hr, Intravenous, Continuous, Mundo ZARATE  Gonda, M.D.  •  calcium GLUConate injection 1 g, 1 g, Intravenous, Once, Peter Singer M.D.  •  Action is required: Protocol 452 Propofol Critical Care has been implemented, , , CONTINUOUS **AND** propofol (DIPRIVAN) injection, 0-80 mcg/kg/min, Intravenous, Continuous **AND** Propofol Critical Care Protocol - Patient Must Have an Advanced Airway with Mechanical Ventilation in Use., , , CONTINUOUS **AND** Propofol Critical Care Protocol - Spontaneous breathing trials may be attempted while receiving propofol, , , CONTINUOUS **AND** Propofol Critical Care Protocol - If patient meets extubation criteria, propofol MUST be discontinued prior to extubation, , , CONTINUOUS **AND** Propofol Critical Care Protocol - No allergy to propofol, soybean oil, or eggs, , , CONTINUOUS **AND** Propofol Critical Care Protocol - Do not administer this medication to children under the age of 12, , , CONTINUOUS **AND** Propofol Critical Care Protocol - Dedicated IV line for administration (vehicle supports rapid growth of microorganisms and incompatibilities cannot be detected), , , CONTINUOUS **AND** Propofol Critical Care Protocol - Administration tubing and any unused emulsion must be changed out and discarded after 12 hours from spiking vial, , , CONTINUOUS **AND** Propofol Critical Care Protocol - RASS guildelines, , , CONTINUOUS **AND** Propofol Critical Care Protocol - Wake-up assessment as ordered by MD, , , CONTINUOUS **AND** Propofol Critical Care Protocol - Propofol is not an analgesic, concurrent analgesia (if patient experiencing pain) should continue while patient is on propofol, , , CONTINUOUS **AND** [START ON 9/23/2017] Triglycerides Starting now and then Every 3 Days, , , Every 3 Days (0300) **AND** Propofol Critical Care Protocol - Notify MD prior to exceeding 80 mcg/kg/min and obtain new order for higher limit, , , CONTINUOUS, Jeremy M Gonda, M.D.  •  vasopressin (VASOSTRICT) 20 Units in  mL Infusion, 0.03  Units/min, Intravenous, Continuous, Jeremy M Gonda, M.D.    Current Outpatient Prescriptions:   •  gabapentin (NEURONTIN) 100 MG Cap, Take 300 mg by mouth 3 times a day., Disp: , Rfl:   •  meloxicam (MOBIC) 7.5 MG Tab, Take 15 mg by mouth every day., Disp: , Rfl:   •  ranitidine (ZANTAC) 150 MG Tab, Take 150 mg by mouth 2 times a day., Disp: , Rfl:   •  metoprolol (LOPRESSOR) 25 MG Tab, Take 25 mg by mouth 2 times a day., Disp: , Rfl:   •  lisinopril (PRINIVIL) 5 MG Tab, Take 5 mg by mouth every day., Disp: , Rfl:   •  metformin (GLUCOPHAGE) 500 MG Tab, Take 500 mg by mouth 2 times a day, with meals., Disp: , Rfl:   •  capsaicin (ZOSTRIX) 0.025 % cream, Apply 1 Application to affected area(s) 4 times a day. knees , Disp: , Rfl:   •  amlodipine (NORVASC) 5 MG Tab, Take 5 mg by mouth every day., Disp: , Rfl:   •  tamsulosin (FLOMAX) 0.4 MG capsule, Take 0.4 mg by mouth ONE-HALF HOUR AFTER BREAKFAST., Disp: , Rfl:   •  atorvastatin (LIPITOR) 20 MG Tab, Take 20 mg by mouth every evening., Disp: , Rfl:       ALLERGIES  No Known Allergies    PHYSICAL EXAM  VITAL SIGNS: BP (!) 85/56   Pulse (!) 109   Temp 36 °C (96.8 °F)   Resp 16   Wt 104.3 kg (230 lb)   SpO2 93% Comment: vent  BMI 33.97 kg/m²        Constitutional: Ill-appearing intubated sedated pale  HENT: Normocephalic, Atraumatic, Bilateral external ears normal, Oropharynx moist, No oral exudates, Nose normal. 8.0 endotracheal tube to 24 cm at lips  Eyes: PERRLA, EOMI, Conjunctiva normal, No discharge.   Neck: Normal range of motion, No tenderness, Supple, No stridor.    Cardiovascular: Tachycardic, Normal rhythm, No murmurs, No rubs, No gallops.   Thorax & Lungs: Normal breath sounds, No respiratory distress, No wheezing, No chest tenderness.   Abdomen: Bowel sounds normal, Soft, No tenderness, No masses, No pulsatile masses.   Skin: Mottled appearance   Back: No tenderness, No CVA tenderness.   Rectal: Bright red blood per rectum  Extremities: Intact distal  pulses, No edema, No tenderness, No cyanosis, No clubbing.   Neurologic: GCS 3T     RADIOLOGY/PROCEDURES  DX-CHEST-PORTABLE (1 VIEW)   Final Result      1.  Interval placement of multiple support devices.      2.  Bibasilar atelectasis.      3.  Cardiomegaly.      CTA ABDOMEN PELVIS W & W/O POST PROCESS   Final Result      No active contrast extravasation is identified to suggest an active GI bleed at this time. However, evaluation of the stomach and small bowel is particularly limited by the presence of enteric contrast.      Mild narrowing of the proximal celiac trunk.      Colonic diverticulosis. Colon is fluid-filled which can be seen in the setting of diarrhea.      Atherosclerotic plaque.      Bibasilar opacities may represent atelectasis or consolidation.      Pleural calcification can be seen in the setting of prior asbestos exposure.            NM-GI BLEEDING SCAN    (Results Pending)   DX-CHEST-PORTABLE (1 VIEW)    (Results Pending)     Results for orders placed or performed during the hospital encounter of 09/21/17   COD (ADULT)   Result Value Ref Range    ABO Grouping Only O     Rh Grouping Only POS     Antibody Screen-Cod NEG    CBC WITH DIFFERENTIAL   Result Value Ref Range    WBC 12.4 (H) 4.8 - 10.8 K/uL    RBC 2.82 (L) 4.70 - 6.10 M/uL    Hemoglobin 8.5 (L) 14.0 - 18.0 g/dL    Hematocrit 28.3 (L) 42.0 - 52.0 %    .4 (H) 81.4 - 97.8 fL    MCH 30.1 27.0 - 33.0 pg    MCHC 30.0 (L) 33.7 - 35.3 g/dL    RDW 59.3 (H) 35.9 - 50.0 fL    Platelet Count 154 (L) 164 - 446 K/uL    MPV 9.0 9.0 - 12.9 fL    Neutrophils-Polys 76.10 (H) 44.00 - 72.00 %    Lymphocytes 15.00 (L) 22.00 - 41.00 %    Monocytes 6.50 0.00 - 13.40 %    Eosinophils 0.90 0.00 - 6.90 %    Basophils 0.20 0.00 - 1.80 %    Immature Granulocytes 1.30 (H) 0.00 - 0.90 %    Nucleated RBC 0.00 /100 WBC    Neutrophils (Absolute) 9.43 (H) 1.82 - 7.42 K/uL    Lymphs (Absolute) 1.86 1.00 - 4.80 K/uL    Monos (Absolute) 0.81 0.00 - 0.85 K/uL    Eos  (Absolute) 0.11 0.00 - 0.51 K/uL    Baso (Absolute) 0.03 0.00 - 0.12 K/uL    Immature Granulocytes (abs) 0.16 (H) 0.00 - 0.11 K/uL    NRBC (Absolute) 0.00 K/uL   COMP METABOLIC PANEL   Result Value Ref Range    Sodium 134 (L) 135 - 145 mmol/L    Potassium 5.2 3.6 - 5.5 mmol/L    Chloride 117 (H) 96 - 112 mmol/L    Co2 13 (L) 20 - 33 mmol/L    Anion Gap 4.0 0.0 - 11.9    Glucose 188 (H) 65 - 99 mg/dL    Bun 10 8 - 22 mg/dL    Creatinine 0.41 (L) 0.50 - 1.40 mg/dL    Calcium 4.8 (LL) 8.5 - 10.5 mg/dL    AST(SGOT) 13 12 - 45 U/L    ALT(SGPT) 16 2 - 50 U/L    Alkaline Phosphatase 29 (L) 30 - 99 U/L    Total Bilirubin 0.4 0.1 - 1.5 mg/dL    Albumin 1.9 (L) 3.2 - 4.9 g/dL    Total Protein 3.5 (L) 6.0 - 8.2 g/dL    Globulin 1.6 (L) 1.9 - 3.5 g/dL    A-G Ratio 1.2 g/dL   TROPONIN   Result Value Ref Range    Troponin I <0.01 0.00 - 0.04 ng/mL   PROTHROMBIN TIME   Result Value Ref Range    PT 18.3 (H) 12.0 - 14.6 sec    INR 1.47 (H) 0.87 - 1.13   APTT   Result Value Ref Range    APTT 29.0 24.7 - 36.0 sec   LACTIC ACID   Result Value Ref Range    Lactic Acid 2.0 0.5 - 2.0 mmol/L   Triglycerides Starting now and then Every 3 Days   Result Value Ref Range    Triglycerides 93 0 - 149 mg/dL   ESTIMATED GFR   Result Value Ref Range    GFR If African American >60 >60 mL/min/1.73 m 2    GFR If Non African American >60 >60 mL/min/1.73 m 2   PLATELETS REQUEST   Result Value Ref Range    Component P       PTP                 Plts,Pheresis       [id number]   issued       09/21/17   11:43      Product Type Platelets  Pheresis LR     Dispense Status Issued     Unit Number (Barcoded) [id number]     Product Code (Barcoded) K9736U99     Blood Type (Barcoded) 1700    FRESH FROZEN PLASMA   Result Value Ref Range    Component Ft       FPT                 Plasma, Thawed      [id number]   issued       09/21/17   11:44      Product Type Plasma  Thawed     Dispense Status Issued     Unit Number (Barcoded) [id number]     Product Code  (Barcoded) [id number]     Blood Type (Barcoded) 6200     Component Ft       FPT-Ba              Plasma, Thawed      [id number]   issued       09/21/17   11:44      Product Type Plasma  Thawed     Dispense Status Issued     Unit Number (Barcoded) [id number]     Product Code (Barcoded) [id number]     Blood Type (Barcoded) 6200    ISTAT ARTERIAL BLOOD GAS   Result Value Ref Range    Ph 7.194 (LL) 7.400 - 7.500    Pco2 47.7 (H) 26.0 - 37.0 mmHg    Po2 69 64 - 87 mmHg    Tco2 20 20 - 33 mmol/L    S02 89 (L) 93 - 99 %    Hco3 18.4 17.0 - 25.0 mmol/L    BE -9 (L) -4 - 3 mmol/L    Body Temp 96.8 F degrees    O2 Therapy 100 %    Ph Temp Eliezer 7.208 (LL) 7.400 - 7.500    Pco2 Temp Co 45.6 (H) 26.0 - 37.0 mmHg    Po2 Temp Cor 64 64 - 87 mmHg    Specimen Arterial     Physician procedure: Central venous catheter indication patient hemorrhagic shock. Consent was obtained from the wife. The right anterior neck was prepped with chlorhexidine. Sterile gown and gloves and universal precautions were used. Using dynamic real-time ultrasound-guided right internal jugular vein was visualized. 5 mL of lidocaine was infiltrated into the skin for anesthesia. Finder needle was inserted on the 1st attempt. Dark nonpulsatile blood was aspirated. She'll therefore was advanced without resistance. The tract was dilated and the catheters placed over the wire and the wire was removed. All ports were flushed. Sterile dressing was applied and the line was sewn in place. Chest x-ray confirmed placement no complications    COURSE & MEDICAL DECISION MAKING  Pertinent Labs & Imaging studies reviewed. (See chart for details)  Patient was critically ill on arrival. We immediately determined that he would require an emergent CT angiogram of the abdomen to determine if he had active extravasation. This did not clarify a clear source of bleeding. I did review the case with interventional radiology with Dr. Arana did not feel that traditional angiogram would  be of benefit. I consulted gastroenterology with Dr. Marc who did not feel that colonoscopy would be useful due to the heavy amount of bleeding that could not likely localize hemorrhagic source. Ultimately consultation with Dr. Wagner was obtained with general surgery. He feels that because the patient is continuing to exsanguinate he will need to go to the operating room for definitive exploration and management of hemorrhagic shock. The patient had an additional 2 units of packed red cells as well as 2 units of fresh frozen plasma and the pack of platelets administered as well as calcium because he was developing hemorrhagic shock with coagulopathy. The patient is in guarded condition.    CRITICAL CARE TIME:    The patient required approximately 50 minutes worth of critical care time. This excludes any procedures. This includes time spent directly at caring for the patient, making critical medical decisions, involving consultants and speaking with the family.    FINAL IMPRESSION  1. Massive lower GI bleed with hemorrhagic shock  2. Coagulopathy due to hemorrhagic shock  3. Hypocalcemia         Electronically signed by: Peter Singer, 9/21/2017 10:35 AM

## 2017-09-21 NOTE — ED NOTES
Med rec complete per S/O with med list  Allergies reviewed    S/O does not know when he took his medications last

## 2017-09-21 NOTE — CARE PLAN
Problem: Oxygenation:  Goal: Maintain adequate oxygenation dependent on patient condition  Outcome: PROGRESSING SLOWER THAN EXPECTED      Problem: Ventilation Defect:  Goal: Ability to achieve and maintain unassisted ventilation or tolerate decreased levels of ventilator support  Outcome: PROGRESSING AS EXPECTED  Adult Ventilation Update    Total Vent Days: 1    Patient Lines/Drains/Airways Status    Active Airway     Name: Placement date: Placement time: Site: Days:    Airway Group ET Tube Oral 7.5 09/21/17   1020   Oral     1                   Events/Summary/Plan: PEEP titrated to 10 per Dr. Gonda, no other vent changes made at this time, will continue to monitor

## 2017-09-21 NOTE — DISCHARGE PLANNING
Medical Social Work    MS[facility] to [facility]. MSW met with pt's wife Barbara ([phone]) and daughter Janny ([phone]) who stated [facility] then coded and brought to [facility]. Pt was intubated upon arrival. MSW escorted pt's family up to the OR waiting room. No other needs at this time. Will remain available for support.

## 2017-09-21 NOTE — PROGRESS NOTES
Patient arrived on RICU in R107 with PACU team. Patient has stable vital signs and is running propofol at 20. Patient clothing in room and wedding band is still on finger. Will attempt to removed.     CHG bath done, bear hugger applied .

## 2017-09-21 NOTE — H&P
" [facility] Internal Medicine Admitting History and Physical    Name Jamil Kendall       1935   Age/Sex 82 y.o. male   MRN [id number]   Code Status Full Code     After 5PM or if no immediate response to page, please call for cross-coverage  Attending/Team: Timothy Herndon Call [phone] to page   1st Call - Day Intern (R1):    2nd Call - Day Sr. Resident (R2/R3):   Reg Pickard       Chief Complaint:  Massive Lower GI bleeding    HPI:  Mr Jamil Kendall is an 82-year-old male  who presents with symptoms of massive lower GI bleeding and hemorrhagic shock. He was transferred from the [facility]. He is unable to provide history as he is intubated but is nodding and following and indicates he is not in pain. Son (Farhat) and wife (Barbara) were at bedside and did answer several questions regarding his history. Apparently he has a history of COPD but has quit smoking and is not on oxygen at home. He does use CPAP at night. He drinks 4-5 glasses of scotch nightly but has never experienced withdrawal during periods of prolonged abstinence and does not have any known history of liver disease.      Review of Systems   Unable to perform ROS: Intubated             Past Medical History:   Past Medical History:   Diagnosis Date   • Cold 2016   • Pneumonia    • Skin cancer    • Arthritis     back   • Cancer (CMS-[facility])    • Cataract     bilat IOL    • COPD (chronic obstructive pulmonary disease) (CMS-HCC)    • Coughing blood    • Emphysema of lung (CMS-HCC)    • Heart murmur    • Heart valve disease     \"leaky heart valve\"    • Hiatus hernia syndrome    • High cholesterol    • Hypertension    • Pain     back, right knees   • Sleep apnea     CPAP   • Snoring        Past Surgical History:  Past Surgical History:   Procedure Laterality Date   • LUMBAR LAMINECTOMY DISKECTOMY  2017    Procedure: LUMBAR LAMINECTOMY DISKECTOMY - L3-4, L4-5, L5-S1;  Surgeon: Shana Salamanca M.D.;  " Location: SURGERY [facility];  Service:    • LUMBAR DECOMPRESSION  2/1/2017    Procedure: LUMBAR DECOMPRESSION;  Surgeon: Shana Salamanca M.D.;  Location: SURGERY [facility];  Service:    • FORAMINOTOMY Bilateral 2/1/2017    Procedure: FORAMINOTOMY;  Surgeon: Shana Salamanca M.D.;  Location: SURGERY [facility];  Service:    • HIP ARTHROSCOPY  8/4/08    Performed by AGAPITO CLAROS at SURGERY [facility]   • DEBRIDEMENT  8/4/08    Performed by AGAPITO CLAROS at SURGERY [facility]   • ROTATOR CUFF REPAIR Right 1999   • ROTATOR CUFF REPAIR Left 1995   • MASTECTOMY Left 1965   • CATARACT EXTRACTION WITH IOL     • EGD ESOPHAGUS WITH ENDOSCOPIC US  1990s    with dilation    • TONSILLECTOMY      as child        Current Outpatient Medications:  Home Medications     Reviewed by Cindy Pruitt (Pharmacy Tech) on 09/21/17 at 1049  Med List Status: Complete   Medication Last Dose Status   amlodipine (NORVASC) 5 MG Tab 9/20/2017 Active   atorvastatin (LIPITOR) 20 MG Tab 9/20/2017 Active   capsaicin (ZOSTRIX) 0.025 % cream PRN Active   gabapentin (NEURONTIN) 100 MG Cap  Active   lisinopril (PRINIVIL) 5 MG Tab 9/20/2017 Active   meloxicam (MOBIC) 7.5 MG Tab 9/20/2017 Active   metformin (GLUCOPHAGE) 500 MG Tab 9/20/2017 Active   metoprolol (LOPRESSOR) 25 MG Tab 9/20/2017 Active   ranitidine (ZANTAC) 150 MG Tab 9/20/2017 Active   tamsulosin (FLOMAX) 0.4 MG capsule 9/20/2017 Active                Medication Allergy/Sensitivities:  No Known Allergies      Family History:  History reviewed. No pertinent family history.    Social History:  Social History     Social History   • Marital status:      Spouse name: N/A   • Number of children: N/A   • Years of education: N/A     Occupational History   • Not on file.     Social History Main Topics   • Smoking status: Former Smoker     Packs/day: 0.00     Years: 30.00     Types: Cigars     Quit date: 1/1/1989   • Smokeless tobacco: Never Used   •  Alcohol use Yes      Comment: 3-4 scotch a night   • Drug use: No   • Sexual activity: Not on file     Other Topics Concern   • Not on file     Social History Narrative   • No narrative on file       Physical Exam     Vitals:    09/21/17 1231 09/21/17 1235 09/21/17 1240 09/21/17 1244   BP:       Pulse: 90 91 92    Resp: 14 15 14    Temp:       SpO2: 100% 100% 99% 99%   Weight:         Body mass index is 33.97 kg/m².  /65   Pulse 92   Temp (!) 35.8 °C (96.4 °F)   Resp 14   Wt 104.3 kg (230 lb)   SpO2 99%   BMI 33.97 kg/m²   O2 therapy: Pulse Oximetry: 99 %    Physical Exam   Constitutional: He is well-developed, well-nourished, and in no distress.   HENT:   Head: Normocephalic and atraumatic.   Eyes: No scleral icterus.   Neck: No JVD present.   Cardiovascular: Normal rate and regular rhythm.    Pulmonary/Chest:   intubated   Abdominal: Soft. He exhibits no distension. There is no tenderness. There is no rebound.   No external sign of bleed   Neurological: He is alert.   Follows on minimal sedation.    Skin: Skin is dry.             Data Review       Old Records Request:   Completed  Current Records review and summary: Completed    Lab Data Review:  Recent Results (from the past 24 hour(s))   COD (ADULT)    Collection Time: 09/21/17 10:22 AM   Result Value Ref Range    ABO Grouping Only O     Rh Grouping Only POS     Antibody Screen-Cod NEG    CBC WITH DIFFERENTIAL    Collection Time: 09/21/17 10:22 AM   Result Value Ref Range    WBC 12.4 (H) 4.8 - 10.8 K/uL    RBC 2.82 (L) 4.70 - 6.10 M/uL    Hemoglobin 8.5 (L) 14.0 - 18.0 g/dL    Hematocrit 28.3 (L) 42.0 - 52.0 %    .4 (H) 81.4 - 97.8 fL    MCH 30.1 27.0 - 33.0 pg    MCHC 30.0 (L) 33.7 - 35.3 g/dL    RDW 59.3 (H) 35.9 - 50.0 fL    Platelet Count 154 (L) 164 - 446 K/uL    MPV 9.0 9.0 - 12.9 fL    Neutrophils-Polys 76.10 (H) 44.00 - 72.00 %    Lymphocytes 15.00 (L) 22.00 - 41.00 %    Monocytes 6.50 0.00 - 13.40 %    Eosinophils 0.90 0.00 - 6.90 %     Basophils 0.20 0.00 - 1.80 %    Immature Granulocytes 1.30 (H) 0.00 - 0.90 %    Nucleated RBC 0.00 /100 WBC    Neutrophils (Absolute) 9.43 (H) 1.82 - 7.42 K/uL    Lymphs (Absolute) 1.86 1.00 - 4.80 K/uL    Monos (Absolute) 0.81 0.00 - 0.85 K/uL    Eos (Absolute) 0.11 0.00 - 0.51 K/uL    Baso (Absolute) 0.03 0.00 - 0.12 K/uL    Immature Granulocytes (abs) 0.16 (H) 0.00 - 0.11 K/uL    NRBC (Absolute) 0.00 K/uL   COMP METABOLIC PANEL    Collection Time: 09/21/17 10:22 AM   Result Value Ref Range    Sodium 134 (L) 135 - 145 mmol/L    Potassium 5.2 3.6 - 5.5 mmol/L    Chloride 117 (H) 96 - 112 mmol/L    Co2 13 (L) 20 - 33 mmol/L    Anion Gap 4.0 0.0 - 11.9    Glucose 188 (H) 65 - 99 mg/dL    Bun 10 8 - 22 mg/dL    Creatinine 0.41 (L) 0.50 - 1.40 mg/dL    Calcium 4.8 (LL) 8.5 - 10.5 mg/dL    AST(SGOT) 13 12 - 45 U/L    ALT(SGPT) 16 2 - 50 U/L    Alkaline Phosphatase 29 (L) 30 - 99 U/L    Total Bilirubin 0.4 0.1 - 1.5 mg/dL    Albumin 1.9 (L) 3.2 - 4.9 g/dL    Total Protein 3.5 (L) 6.0 - 8.2 g/dL    Globulin 1.6 (L) 1.9 - 3.5 g/dL    A-G Ratio 1.2 g/dL   TROPONIN    Collection Time: 09/21/17 10:22 AM   Result Value Ref Range    Troponin I <0.01 0.00 - 0.04 ng/mL   PROTHROMBIN TIME    Collection Time: 09/21/17 10:22 AM   Result Value Ref Range    PT 18.3 (H) 12.0 - 14.6 sec    INR 1.47 (H) 0.87 - 1.13   APTT    Collection Time: 09/21/17 10:22 AM   Result Value Ref Range    APTT 29.0 24.7 - 36.0 sec   LACTIC ACID    Collection Time: 09/21/17 10:22 AM   Result Value Ref Range    Lactic Acid 2.0 0.5 - 2.0 mmol/L   Triglycerides Starting now and then Every 3 Days    Collection Time: 09/21/17 10:22 AM   Result Value Ref Range    Triglycerides 93 0 - 149 mg/dL   ESTIMATED GFR    Collection Time: 09/21/17 10:22 AM   Result Value Ref Range    GFR If African American >60 >60 mL/min/1.73 m 2    GFR If Non African American >60 >60 mL/min/1.73 m 2   ISTAT ARTERIAL BLOOD GAS    Collection Time: 09/21/17 10:29 AM   Result Value Ref  Range    Ph 7.194 (LL) 7.400 - 7.500    Pco2 47.7 (H) 26.0 - 37.0 mmHg    Po2 69 64 - 87 mmHg    Tco2 20 20 - 33 mmol/L    S02 89 (L) 93 - 99 %    Hco3 18.4 17.0 - 25.0 mmol/L    BE -9 (L) -4 - 3 mmol/L    Body Temp 96.8 F degrees    O2 Therapy 100 %    Ph Temp Eliezer 7.208 (LL) 7.400 - 7.500    Pco2 Temp Co 45.6 (H) 26.0 - 37.0 mmHg    Po2 Temp Cor 64 64 - 87 mmHg    Specimen Arterial    PLATELET MAPPING WITH BASIC TEG    Collection Time: 09/21/17 11:11 AM   Result Value Ref Range    Reaction Time Initial-R 2.9 (L) 5.0 - 10.0 min    Clot Kinetics-K 1.3 1.0 - 3.0 min    Clot Angle-Angle 71.0 53.0 - 72.0 degrees    Maximum Clot Strength-MA 65.4 50.0 - 70.0 mm    Lysis 30 minutes-LY30 0.0 0.0 - 8.0 %    % Inhibition ADP 57.3 %    % Inhibition AA 51.6 %    TEG Algorithm Link Algorithm    PLATELETS REQUEST    Collection Time: 09/21/17 11:42 AM   Result Value Ref Range    Component P       PTP                 Plts,Pheresis       [id number]   issued       09/21/17   11:43      Product Type Platelets  Pheresis LR     Dispense Status Issued     Unit Number (Barcoded) [id number]     Product Code (Barcoded) X7717D65     Blood Type (Barcoded) 1700    FRESH FROZEN PLASMA    Collection Time: 09/21/17 11:42 AM   Result Value Ref Range    Component Ft       FPT                 Plasma, Thawed      [id number]   issued       09/21/17   11:44      Product Type Plasma  Thawed     Dispense Status Issued     Unit Number (Barcoded) [id number]     Product Code (Barcoded) S0863Z95     Blood Type (Barcoded) 6200     Component Ft       FPT-Ba              Plasma, Thawed      [id number]   issued       09/21/17   11:44      Product Type Plasma  Thawed     Dispense Status Issued     Unit Number (Barcoded) [id number]     Product Code (Barcoded) [id number]     Blood Type (Barcoded) 6200        Imaging/Procedures Review:    ndependant Imaging Review: Completed  DX-CHEST-PORTABLE (1 VIEW)   Final Result      1.  Interval placement of  multiple support devices.      2.  Bibasilar atelectasis.      3.  Cardiomegaly.      CTA ABDOMEN PELVIS W & W/O POST PROCESS   Final Result      No active contrast extravasation is identified to suggest an active GI bleed at this time. However, evaluation of the stomach and small bowel is particularly limited by the presence of enteric contrast.      Mild narrowing of the proximal celiac trunk.      Colonic diverticulosis. Colon is fluid-filled which can be seen in the setting of diarrhea.      Atherosclerotic plaque.      Bibasilar opacities may represent atelectasis or consolidation.      Pleural calcification can be seen in the setting of prior asbestos exposure.            NM-GI BLEEDING SCAN    (Results Pending)                 Assessment/Plan     PROBLEM LIST  Massive Gastrointestinal bleeding  Hemorrhagic shock  Coagulopathy - INR 1.47 on arrival, not on anticoagulation  Hyponatremia  NAGMA and respiratory acidosis  Hyperglycemia  Hypocalcemia  Leukocytosis  Macrocytosis  Acute on chronic anemia - Acute blood loss and macrocytosis 2/2 etoh/?liver disease  Thrombocytopenia - plt 154 on arrival  ETOH use - drinks four drinks per night      Assessment: Massive lower GI bleeding in critically ill patient. He was intubated on arrival. His MAP is low, he is in hemorrhagic shock. He required 4 units of pRBC as well as ffp and platelets. A central line was placed in the ED and levophed was started. Emergent CT angiogram failed to localize the source of bleeding and both GI And IR did not feel that endoscopy or angiography would be appropriate in this case due in part to intraluminal blood. Dr. Wagner with general surgery took the patient to the OR for emergent subtotal colectomy which is in process at this time. His prognosis is guarded. Case was discussed with ERP and Dr. Wagner we will admit this patient to the ICU and follow closely with general surgery moving forward.     His coagulopathy may be due to  underlying liver disease, perhaps cirrhosis, as I do not believe he is on chronic anticoagulation as an outpatient. He does drink 4 drinks per night and has macrocytosis with laboratory evidence of liver disease but again this is difficult to interpret during this acute and severe illness.     Plan  - emergent subtotal colectomy   - H/H q6H  - reverse underlying coagulopathy  - Keep two large bore peripheral IVs  - COD complete, transfuse as needed  - IV protonix  - No octreotide at present per GI  - Hold anti-platelet and anticoagulation  - Close hemodynamic support in the ICU    Full Code  No antibiotics  No pharmacologic dvt prophylaxis  IV protonix    Anticipated Hospital stay:  >2 midnights        Quality Measures    Reviewed items::  Labs reviewed, Medications reviewed and Radiology images reviewed  DVT prophylaxis pharmacological::  Contraindicated - Anemia requiring blood transfusion  DVT prophylaxis - mechanical:  SCDs

## 2017-09-21 NOTE — ED NOTES
PMA at bedside. Pt opens eye, responds appropriately to questions. poc to increase sedative once blood pressure stable. Pt tolerating ventilator.

## 2017-09-21 NOTE — ED NOTES
Pt opening eyes to verbal stimuli coughing, Pt placed on Propofol gtt.   Pt taken to CT by mir with RT and RN

## 2017-09-22 ENCOUNTER — APPOINTMENT (OUTPATIENT)
Dept: RADIOLOGY | Facility: MEDICAL CENTER | Age: 82
DRG: 329 | End: 2017-09-22
Attending: INTERNAL MEDICINE
Payer: COMMERCIAL

## 2017-09-22 PROBLEM — Z90.49 S/P TOTAL COLECTOMY: Status: ACTIVE | Noted: 2017-09-22

## 2017-09-22 PROBLEM — E87.6 HYPOKALEMIA: Status: ACTIVE | Noted: 2017-09-22

## 2017-09-22 PROBLEM — J96.01 ACUTE HYPOXEMIC RESPIRATORY FAILURE (HCC): Status: ACTIVE | Noted: 2017-09-22

## 2017-09-22 PROBLEM — Z93.2 S/P ILEOSTOMY (HCC): Status: ACTIVE | Noted: 2017-09-22

## 2017-09-22 PROBLEM — E83.42 HYPOMAGNESEMIA: Status: ACTIVE | Noted: 2017-09-22

## 2017-09-22 LAB
ANION GAP SERPL CALC-SCNC: 8 MMOL/L (ref 0–11.9)
BASE EXCESS BLDA CALC-SCNC: -1 MMOL/L (ref -4–3)
BASOPHILS # BLD AUTO: 0.1 % (ref 0–1.8)
BASOPHILS # BLD: 0.02 K/UL (ref 0–0.12)
BODY TEMPERATURE: ABNORMAL DEGREES
BUN SERPL-MCNC: 9 MG/DL (ref 8–22)
CALCIUM SERPL-MCNC: 7.5 MG/DL (ref 8.5–10.5)
CHLORIDE SERPL-SCNC: 107 MMOL/L (ref 96–112)
CO2 BLDA-SCNC: 23 MMOL/L (ref 20–33)
CO2 SERPL-SCNC: 23 MMOL/L (ref 20–33)
CREAT SERPL-MCNC: 0.53 MG/DL (ref 0.5–1.4)
EOSINOPHIL # BLD AUTO: 0 K/UL (ref 0–0.51)
EOSINOPHIL NFR BLD: 0 % (ref 0–6.9)
ERYTHROCYTE [DISTWIDTH] IN BLOOD BY AUTOMATED COUNT: 53.7 FL (ref 35.9–50)
GFR SERPL CREATININE-BSD FRML MDRD: >60 ML/MIN/1.73 M 2
GLUCOSE SERPL-MCNC: 123 MG/DL (ref 65–99)
HCO3 BLDA-SCNC: 22.1 MMOL/L (ref 17–25)
HCT VFR BLD AUTO: 28.3 % (ref 42–52)
HGB BLD-MCNC: 9.8 G/DL (ref 14–18)
IMM GRANULOCYTES # BLD AUTO: 0.11 K/UL (ref 0–0.11)
IMM GRANULOCYTES NFR BLD AUTO: 0.6 % (ref 0–0.9)
LYMPHOCYTES # BLD AUTO: 1.25 K/UL (ref 1–4.8)
LYMPHOCYTES NFR BLD: 7.3 % (ref 22–41)
MAGNESIUM SERPL-MCNC: 1.6 MG/DL (ref 1.5–2.5)
MCH RBC QN AUTO: 30.3 PG (ref 27–33)
MCHC RBC AUTO-ENTMCNC: 34.6 G/DL (ref 33.7–35.3)
MCV RBC AUTO: 87.6 FL (ref 81.4–97.8)
MONOCYTES # BLD AUTO: 1.39 K/UL (ref 0–0.85)
MONOCYTES NFR BLD AUTO: 8.2 % (ref 0–13.4)
NEUTROPHILS # BLD AUTO: 14.26 K/UL (ref 1.82–7.42)
NEUTROPHILS NFR BLD: 83.8 % (ref 44–72)
NRBC # BLD AUTO: 0 K/UL
NRBC BLD AUTO-RTO: 0 /100 WBC
O2/TOTAL GAS SETTING VFR VENT: 80 %
PCO2 BLDA: 30.8 MMHG (ref 26–37)
PCO2 TEMP ADJ BLDA: 31.8 MMHG (ref 26–37)
PH BLDA: 7.46 [PH] (ref 7.4–7.5)
PH TEMP ADJ BLDA: 7.45 [PH] (ref 7.4–7.5)
PHOSPHATE SERPL-MCNC: 2.9 MG/DL (ref 2.5–4.5)
PLATELET # BLD AUTO: 160 K/UL (ref 164–446)
PMV BLD AUTO: 9.4 FL (ref 9–12.9)
PO2 BLDA: 204 MMHG (ref 64–87)
PO2 TEMP ADJ BLDA: 208 MMHG (ref 64–87)
POTASSIUM SERPL-SCNC: 3.8 MMOL/L (ref 3.6–5.5)
RBC # BLD AUTO: 3.23 M/UL (ref 4.7–6.1)
SAO2 % BLDA: 100 % (ref 93–99)
SODIUM SERPL-SCNC: 138 MMOL/L (ref 135–145)
SPECIMEN DRAWN FROM PATIENT: ABNORMAL
WBC # BLD AUTO: 17 K/UL (ref 4.8–10.8)

## 2017-09-22 PROCEDURE — 94003 VENT MGMT INPAT SUBQ DAY: CPT

## 2017-09-22 PROCEDURE — 37799 UNLISTED PX VASCULAR SURGERY: CPT

## 2017-09-22 PROCEDURE — 94668 MNPJ CHEST WALL SBSQ: CPT

## 2017-09-22 PROCEDURE — 700111 HCHG RX REV CODE 636 W/ 250 OVERRIDE (IP)

## 2017-09-22 PROCEDURE — 700105 HCHG RX REV CODE 258: Performed by: STUDENT IN AN ORGANIZED HEALTH CARE EDUCATION/TRAINING PROGRAM

## 2017-09-22 PROCEDURE — 94667 MNPJ CHEST WALL 1ST: CPT

## 2017-09-22 PROCEDURE — 700102 HCHG RX REV CODE 250 W/ 637 OVERRIDE(OP): Performed by: INTERNAL MEDICINE

## 2017-09-22 PROCEDURE — A9270 NON-COVERED ITEM OR SERVICE: HCPCS | Performed by: INTERNAL MEDICINE

## 2017-09-22 PROCEDURE — 82803 BLOOD GASES ANY COMBINATION: CPT

## 2017-09-22 PROCEDURE — C9113 INJ PANTOPRAZOLE SODIUM, VIA: HCPCS | Performed by: INTERNAL MEDICINE

## 2017-09-22 PROCEDURE — 83735 ASSAY OF MAGNESIUM: CPT

## 2017-09-22 PROCEDURE — 94150 VITAL CAPACITY TEST: CPT

## 2017-09-22 PROCEDURE — 700111 HCHG RX REV CODE 636 W/ 250 OVERRIDE (IP): Performed by: STUDENT IN AN ORGANIZED HEALTH CARE EDUCATION/TRAINING PROGRAM

## 2017-09-22 PROCEDURE — 770022 HCHG ROOM/CARE - ICU (200)

## 2017-09-22 PROCEDURE — 94660 CPAP INITIATION&MGMT: CPT

## 2017-09-22 PROCEDURE — 71010 DX-CHEST-PORTABLE (1 VIEW): CPT

## 2017-09-22 PROCEDURE — 85025 COMPLETE CBC W/AUTO DIFF WBC: CPT

## 2017-09-22 PROCEDURE — 84100 ASSAY OF PHOSPHORUS: CPT

## 2017-09-22 PROCEDURE — 700105 HCHG RX REV CODE 258: Performed by: INTERNAL MEDICINE

## 2017-09-22 PROCEDURE — 80048 BASIC METABOLIC PNL TOTAL CA: CPT

## 2017-09-22 PROCEDURE — 700111 HCHG RX REV CODE 636 W/ 250 OVERRIDE (IP): Performed by: INTERNAL MEDICINE

## 2017-09-22 RX ORDER — POTASSIUM CHLORIDE 14.9 MG/ML
20 INJECTION INTRAVENOUS ONCE
Status: COMPLETED | OUTPATIENT
Start: 2017-09-22 | End: 2017-09-22

## 2017-09-22 RX ORDER — MAGNESIUM SULFATE HEPTAHYDRATE 40 MG/ML
2 INJECTION, SOLUTION INTRAVENOUS ONCE
Status: COMPLETED | OUTPATIENT
Start: 2017-09-22 | End: 2017-09-22

## 2017-09-22 RX ORDER — SODIUM CHLORIDE 9 MG/ML
500 INJECTION, SOLUTION INTRAVENOUS ONCE
Status: COMPLETED | OUTPATIENT
Start: 2017-09-22 | End: 2017-09-22

## 2017-09-22 RX ADMIN — FAMOTIDINE 20 MG: 10 INJECTION, SOLUTION INTRAVENOUS at 20:49

## 2017-09-22 RX ADMIN — FENTANYL CITRATE 50 MCG: 50 INJECTION, SOLUTION INTRAMUSCULAR; INTRAVENOUS at 12:17

## 2017-09-22 RX ADMIN — PROPOFOL 30 MCG/KG/MIN: 10 INJECTION, EMULSION INTRAVENOUS at 07:58

## 2017-09-22 RX ADMIN — SODIUM CHLORIDE: 9 INJECTION, SOLUTION INTRAVENOUS at 16:24

## 2017-09-22 RX ADMIN — SODIUM CHLORIDE 8 MG/HR: 9 INJECTION, SOLUTION INTRAVENOUS at 02:48

## 2017-09-22 RX ADMIN — POTASSIUM CHLORIDE 20 MEQ: 14.9 INJECTION, SOLUTION INTRAVENOUS at 10:12

## 2017-09-22 RX ADMIN — MAGNESIUM SULFATE IN WATER 2 G: 40 INJECTION, SOLUTION INTRAVENOUS at 10:11

## 2017-09-22 RX ADMIN — CHLORHEXIDINE GLUCONATE 15 ML: 1.2 RINSE ORAL at 07:58

## 2017-09-22 RX ADMIN — SODIUM CHLORIDE 500 ML: 9 INJECTION, SOLUTION INTRAVENOUS at 08:09

## 2017-09-22 RX ADMIN — PROPOFOL 30 MCG/KG/MIN: 10 INJECTION, EMULSION INTRAVENOUS at 02:49

## 2017-09-22 ASSESSMENT — ENCOUNTER SYMPTOMS
PALPITATIONS: 0
VOMITING: 0
HEADACHES: 0
SHORTNESS OF BREATH: 0
SPUTUM PRODUCTION: 0
CHILLS: 0
CONSTIPATION: 0
FEVER: 0
ABDOMINAL PAIN: 0
COUGH: 0
DOUBLE VISION: 0
BLURRED VISION: 0
DIARRHEA: 0
PHOTOPHOBIA: 0

## 2017-09-22 ASSESSMENT — PAIN SCALES - GENERAL
PAINLEVEL_OUTOF10: 0

## 2017-09-22 ASSESSMENT — LIFESTYLE VARIABLES
EVER_SMOKED: YES
REASON UNABLE TO ASSESS: INTUBATED

## 2017-09-22 ASSESSMENT — COPD QUESTIONNAIRES
HAVE YOU SMOKED AT LEAST 100 CIGARETTES IN YOUR ENTIRE LIFE: YES
DURING THE PAST 4 WEEKS HOW MUCH DID YOU FEEL SHORT OF BREATH: NONE/LITTLE OF THE TIME
COPD SCREENING SCORE: 6
DO YOU EVER COUGH UP ANY MUCUS OR PHLEGM?: YES, EVERY DAY

## 2017-09-22 ASSESSMENT — PULMONARY FUNCTION TESTS: FVC: 2

## 2017-09-22 NOTE — ASSESSMENT & PLAN NOTE
- BRBPR, causing hemodynamic instability  - CTA abdomen - no bleeding point identified , hence unable to embolize  - Had emergency colectomy and ileostomy 9/21  - Ileostomy pink, functioning, BS +  Plan  - Pepcid PO  - CBC Q daily  - Ambulate  - reduce maintenance TIM 83 ml/hr  - clear fluids, to start with sips as tolerated  - PO home meds  - Lovenox for DVT prophylaxis

## 2017-09-22 NOTE — RESPIRATORY CARE
Extubation    Cuff leak noted: yes  Stridor present : no       Patient toleration: well    Events/Summary/Plan: pt extubated per Dr. Gonda, cuff leak present, no stridor at this time, placed pt on 2LNC and tolerating well (09/22/17 1259)

## 2017-09-22 NOTE — PROGRESS NOTES
Internal Medicine Interval Note    Name Jamil Kendall       1935   Age/Sex 82 y.o. male   MRN [id number]   Code Status FULL CODE     After 5PM or if no immediate response to page, please call for cross-coverage  Attending/Team: Dr. Gonda / SOPHIA Herndon See Patient List for primary contact information  Call [phone] to page    1st Call - Day Intern (R1):   Dr. Boyd 2nd Call - Day Sr. Resident (R2/R3):   Dr. Charles         Reason for interval visit  (Principal Problem)     Emergency Sub-total Colectomy and end ileostomy for LGI Bleed    Interval Problem Daily Status Update  (24 hours)     This 82 year-old-gentleman [facility]  with 3 episodes of BRBPR at home, and had another large witnessed episode [facility], after which he became hemodynamically unstable with BP of 80/50 despite 3 units of packed red cell transfusion. He was intubated for airway protection and was transferred to ER at [facility] for higher-level care. CTA abdomen did not reveal a specific active bleeding point. Following review by gastroenterology and surgeons, he was transferred immediately to OR and underwent an emergency sub-total colectomy and end ileostomy.   - Off vasopressors past 3 hours  - Intubated currently, weaning PEEP and FiO2 requirements and for SBT later with a view to extubation  - awake, alerted and orietnted x 4  - not in pain  - concentrated urine, with UOP of 500 ml past 12 hrs overnight    Patient informed of the procedure yesterday and is aware of stoma, is able to communicate via writing    Review of Systems   Constitutional: Negative for chills and fever.   Eyes: Negative for blurred vision, double vision and photophobia.   Respiratory: Negative for cough, sputum production and shortness of breath.    Cardiovascular: Negative for chest pain, palpitations and leg swelling.   Gastrointestinal: Negative for abdominal pain, constipation, diarrhea and vomiting.   Neurological:  Negative for headaches.       Consultants/Specialty  Dr. Wagner - Surgery    Disposition  ICU    Quality Measures    Reviewed items::  Labs reviewed and Medications reviewed  Dillon catheter::  Unconscious / Sedated Patient on a Ventilator and Critically Ill - Requiring Accurate Measurement of Urinary Output  Central line in place:  Need for access and Shock  DVT prophylaxis pharmacological::  Contraindicated - High bleeding risk  DVT prophylaxis - mechanical:  SCDs  Ulcer Prophylaxis::  Yes          Physical Exam       Vitals:    09/22/17 0600 09/22/17 0615 09/22/17 0732 09/22/17 0946   BP:       Pulse: 71 73     Resp: 12 (!) 3     Temp:       SpO2: 100% 100% 100% 100%   Weight:       Height:         Body mass index is 34.41 kg/m². Weight: 105.7 kg (233 lb 0.4 oz)  Oxygen Therapy:  Pulse Oximetry: 100 %, FIO2%: 60, O2 Delivery: Ventilator    Physical Exam   Constitutional: He is oriented to person, place, and time and well-developed, well-nourished, and in no distress. No distress.   HENT:   Head: Normocephalic and atraumatic.   Eyes: Conjunctivae and EOM are normal. Pupils are equal, round, and reactive to light.   Neck: Normal range of motion. Neck supple.   Cardiovascular: Normal rate, regular rhythm and normal heart sounds.    No murmur heard.  Pulmonary/Chest: Effort normal and breath sounds normal. No respiratory distress. He has no wheezes.   Abdominal: Soft. He exhibits no distension. There is no rebound and no guarding.   BS sluggish  Dressing clean  Ileostomy looks pink, functioning   Musculoskeletal: He exhibits no edema.   Neurological: He is alert and oriented to person, place, and time.   Skin: He is not diaphoretic.         Lab Data Review:         9/22/2017  10:42 AM    Recent Labs      09/21/17   1022  09/22/17   0215   SODIUM  134*  138   POTASSIUM  5.2  3.8   CHLORIDE  117*  107   CO2  13*  23   BUN  10  9   CREATININE  0.41*  0.53   MAGNESIUM   --   1.6   PHOSPHORUS   --   2.9   CALCIUM  4.8*   7.5*       Recent Labs      09/21/17   1022  09/22/17   0215   ALTSGPT  16   --    ASTSGOT  13   --    ALKPHOSPHAT  29*   --    TBILIRUBIN  0.4   --    GLUCOSE  188*  123*       Recent Labs      09/21/17   1022  09/21/17   1744  09/22/17   0215   RBC  2.82*  3.59*  3.23*   HEMOGLOBIN  8.5*  10.8*  9.8*   HEMATOCRIT  28.3*  31.9*  28.3*   PLATELETCT  154*  170  160*   PROTHROMBTM  18.3*  14.6   --    APTT  29.0  28.5   --    INR  1.47*  1.10   --        Recent Labs      09/21/17   1022  09/21/17   1744 09/22/17   0215   WBC  12.4*  19.3*  17.0*   NEUTSPOLYS  76.10*   --   83.80*   LYMPHOCYTES  15.00*   --   7.30*   MONOCYTES  6.50   --   8.20   EOSINOPHILS  0.90   --   0.00   BASOPHILS  0.20   --   0.10   ASTSGOT  13   --    --    ALTSGPT  16   --    --    ALKPHOSPHAT  29*   --    --    TBILIRUBIN  0.4   --    --            Assessment/Plan     * GI bleed   Assessment & Plan    - BRBPR, causing hemodynamic instability  - CTA abdomen - no bleeding point identified , hence unable to embolize  - Had emergency sub-total colectomy and ileostomy 9/21  - Hb 9.8 this am  - Wound dressing clean  - Ileostomy pink, functioning  - Urine output 500 mls past 12 hours/overnight, concentrated  Plan  - Stop Protonix  - Pepcid  - For 8 hrly H&H          S/P total colectomy   Assessment & Plan    - BRBPR, causing hemodynamic instability  - CTA abdomen - no bleeding point identified , hence unable to embolize  - Had emergency colectomy and ileostomy 9/21  - Hb 9.8 this am  - Wound dressing clean  - Ileostomy pink, functioning  - Urine output 500 mls past 12 hours/overnight, concentrated  - Now off vasopressors  Plan  - Stop Protonix  - Pepcid  - For 8 hrly H&H  - Ambulate  - 500 ml fluid bolus  - continue maintenance  ml/hr  - to check with surgeons regarding oral sips          S/P ileostomy (CMS-HCC)   Assessment & Plan    - colectomy and ileostomy 9/21  - ileostomy looking pink, functioning  - stoma care        Acute hypoxemic  respiratory failure (CMS-HCC)   Assessment & Plan    - Intubated for airway protection   - Currently weaning needs for PEEP and FiO2  - SBT and possible extubation later        Hypomagnesemia   Assessment & Plan    - Mag 1.6  - for 2 gm repletion IV        Hypokalemia   Assessment & Plan    - K 3.8  - For 20 Meq today

## 2017-09-22 NOTE — CARE PLAN
Problem: Venous Thromboembolism (VTW)/Deep Vein Thrombosis (DVT) Prevention:  Goal: Patient will participate in Venous Thrombosis (VTE)/Deep Vein Thrombosis (DVT)Prevention Measures  Outcome: PROGRESSING AS EXPECTED  Patient has SCDs in place. Pharmacological VTE prophylaxis contraindicated per recent GI bleed.    Problem: Pain Management  Goal: Pain level will decrease to patient's comfort goal  Outcome: PROGRESSING AS EXPECTED  Patient CPOT score of 0. Patient does not appear to be in distress. Offered emotional support and presence.  RN to reassess Q 4 hours and prn during hourly rounding.

## 2017-09-22 NOTE — ASSESSMENT & PLAN NOTE
- BRBPR, causing hemodynamic instability  - CTA abdomen - no bleeding point identified , hence unable to embolize  - Had emergency sub-total colectomy and ileostomy 9/21  - Wound dressing clean  - Ileostomy pink, functioning  - no pain, no tenderness  - Hb 8.5 today 9/23  Plan  - Pepcid PO  - For CBC QDaily

## 2017-09-22 NOTE — CARE PLAN
Adult Ventilation Update    Total Vent Days: 2    Patient Lines/Drains/Airways Status    Active Airway     Name: Placement date: Placement time: Site: Days:    Airway Group ET Tube Oral 7.5 09/21/17   1020   Oral   less than 1                     Plateau Pressure (Q Shift): 18 (09/21/17 1826)  Static Compliance (ml / cm H2O): 56 (09/22/17 0540)    Patient failed trials because of Barriers to Wean: FiO2 >50% OR PEEP >8 (PMA Only) (09/21/17 1826)  Barriers to SBT    Length of Weaning Trial        Sputum/Suction   Cough: Productive (09/22/17 0400)  Sputum Amount: Scant (09/22/17 0400)  Sputum Color: Tan;White (09/22/17 0400)  Sputum Consistency: Thick (09/22/17 0400)    Mobility Group  Activity Performed: Unable to mobilize (09/21/17 2000)  Pt Calls for Assistance: No (09/21/17 2000)  Staff Present for Mobilization: RN (09/21/17 2000)  Assistive Devices: Slide board (09/22/17 0400)  Reason Not Mobilized: Bed rest;Unstable condition (09/21/17 2000)  Mobilization Comments:  (On Vasopressors, FiO2 of 80%) (09/21/17 2000)    Events/Summary/Plan: Fio2 titrated to 60 post ABG (09/22/17 0540)

## 2017-09-22 NOTE — PROGRESS NOTES
"  S: On vent, alert , follow commands    O: Abdomen soft, ostomy looks good    Blood pressure 119/65, pulse 73, temperature (!) 34.7 °C (94.5 °F), resp. rate (!) 3, height 1.753 m (5' 9\"), weight 105.7 kg (233 lb 0.4 oz), SpO2 100 %.    Intake/Output Summary (Last 24 hours) at 09/22/17 0928  Last data filed at 09/22/17 0600   Gross per 24 hour   Intake          3142.05 ml   Output             2830 ml   Net           312.05 ml     Recent Labs      09/21/17   1022  09/21/17   1744  09/22/17   0215   WBC  12.4*  19.3*  17.0*   RBC  2.82*  3.59*  3.23*   HEMOGLOBIN  8.5*  10.8*  9.8*   HEMATOCRIT  28.3*  31.9*  28.3*   MCV  100.4*  88.9  87.6   MCH  30.1  30.1  30.3   MCHC  30.0*  33.9  34.6   RDW  59.3*  53.9*  53.7*   PLATELETCT  154*  170  160*   MPV  9.0  9.1  9.4     Recent Labs      09/21/17   1022  09/22/17   0215   SODIUM  134*  138   POTASSIUM  5.2  3.8   CHLORIDE  117*  107   CO2  13*  23   GLUCOSE  188*  123*   BUN  10  9   CREATININE  0.41*  0.53   CALCIUM  4.8*  7.5*     Recent Labs      09/21/17   1022  09/22/17   0215   SODIUM  134*  138   POTASSIUM  5.2  3.8   CHLORIDE  117*  107   CO2  13*  23   GLUCOSE  188*  123*   BUN  10  9     Recent Labs      09/21/17   1022  09/21/17   1744   APTT  29.0  28.5   INR  1.47*  1.10     Recent Labs      09/21/17   1022   TROPONINI  <0.01       Current Facility-Administered Medications   Medication Dose   • magnesium sulfate IVPB premix 2 g  2 g   • potassium chloride in water (KCL) ivpb **Administer in ICU only** 20 mEq  20 mEq   • norepinephrine (LEVOPHED) 8 mg in  mL Infusion  0-30 mcg/min   • Respiratory Care per Protocol     • senna-docusate (PERICOLACE or SENOKOT S) 8.6-50 MG per tablet 2 Tab  2 Tab    And   • polyethylene glycol/lytes (MIRALAX) PACKET 1 Packet  1 Packet    And   • magnesium hydroxide (MILK OF MAGNESIA) suspension 30 mL  30 mL    And   • bisacodyl (DULCOLAX) suppository 10 mg  10 mg   • chlorhexidine (PERIDEX) 0.12 % solution 15 mL  15 mL "   • lidocaine (XYLOCAINE) 1%  injection  1-2 mL   • MD ALERT...Adult ICU Electrolyte Replacement per Pharmacy Protocol     • MD ALERT...Pharmacy to implement PROPOFOL CRITICAL CARE PROTOCOL 1 Each  1 Each   • fentaNYL (SUBLIMAZE) injection 25 mcg  25 mcg    Or   • fentaNYL (SUBLIMAZE) injection 50 mcg  50 mcg    Or   • fentaNYL (SUBLIMAZE) injection 100 mcg  100 mcg   • ipratropium-albuterol (DUONEB) nebulizer solution 3 mL  3 mL   • pantoprazole (PROTONIX) 80 mg in  mL Infusion  8 mg/hr   • propofol (DIPRIVAN) injection  0-80 mcg/kg/min   • vasopressin (VASOSTRICT) 20 Units in  mL Infusion  0.03 Units/min   • NS infusion     • pneumococcal 13-Kimi Conj Vacc (PREVNAR 13) syringe 0.5 mL  0.5 mL   • influenza vaccine high-dose injection 0.5 mL  0.5 mL       A:  Active Hospital Problems    Diagnosis   • GI bleed [K92.2]   Status post emergent subtotal colectomy for massive lower GI bleed    P: Patient clinically improving. Ostomy looks good. Postoperative care

## 2017-09-23 ENCOUNTER — APPOINTMENT (OUTPATIENT)
Dept: RADIOLOGY | Facility: MEDICAL CENTER | Age: 82
DRG: 329 | End: 2017-09-23
Attending: INTERNAL MEDICINE
Payer: COMMERCIAL

## 2017-09-23 LAB
ANION GAP SERPL CALC-SCNC: 7 MMOL/L (ref 0–11.9)
BASOPHILS # BLD AUTO: 0.3 % (ref 0–1.8)
BASOPHILS # BLD: 0.04 K/UL (ref 0–0.12)
BUN SERPL-MCNC: 4 MG/DL (ref 8–22)
CALCIUM SERPL-MCNC: 7.5 MG/DL (ref 8.5–10.5)
CHLORIDE SERPL-SCNC: 111 MMOL/L (ref 96–112)
CO2 SERPL-SCNC: 20 MMOL/L (ref 20–33)
CREAT SERPL-MCNC: 0.43 MG/DL (ref 0.5–1.4)
EOSINOPHIL # BLD AUTO: 0.39 K/UL (ref 0–0.51)
EOSINOPHIL NFR BLD: 2.8 % (ref 0–6.9)
ERYTHROCYTE [DISTWIDTH] IN BLOOD BY AUTOMATED COUNT: 59.1 FL (ref 35.9–50)
GFR SERPL CREATININE-BSD FRML MDRD: >60 ML/MIN/1.73 M 2
GLUCOSE SERPL-MCNC: 94 MG/DL (ref 65–99)
HCT VFR BLD AUTO: 25.7 % (ref 42–52)
HGB BLD-MCNC: 8.5 G/DL (ref 14–18)
IMM GRANULOCYTES # BLD AUTO: 0.06 K/UL (ref 0–0.11)
IMM GRANULOCYTES NFR BLD AUTO: 0.4 % (ref 0–0.9)
LYMPHOCYTES # BLD AUTO: 1.46 K/UL (ref 1–4.8)
LYMPHOCYTES NFR BLD: 10.6 % (ref 22–41)
MAGNESIUM SERPL-MCNC: 1.9 MG/DL (ref 1.5–2.5)
MCH RBC QN AUTO: 30.4 PG (ref 27–33)
MCHC RBC AUTO-ENTMCNC: 33.1 G/DL (ref 33.7–35.3)
MCV RBC AUTO: 91.8 FL (ref 81.4–97.8)
MONOCYTES # BLD AUTO: 1.45 K/UL (ref 0–0.85)
MONOCYTES NFR BLD AUTO: 10.5 % (ref 0–13.4)
NEUTROPHILS # BLD AUTO: 10.37 K/UL (ref 1.82–7.42)
NEUTROPHILS NFR BLD: 75.4 % (ref 44–72)
NRBC # BLD AUTO: 0 K/UL
NRBC BLD AUTO-RTO: 0 /100 WBC
PHOSPHATE SERPL-MCNC: 1.7 MG/DL (ref 2.5–4.5)
PLATELET # BLD AUTO: 135 K/UL (ref 164–446)
PMV BLD AUTO: 9.2 FL (ref 9–12.9)
POTASSIUM SERPL-SCNC: 3.6 MMOL/L (ref 3.6–5.5)
RBC # BLD AUTO: 2.8 M/UL (ref 4.7–6.1)
SODIUM SERPL-SCNC: 138 MMOL/L (ref 135–145)
TRIGL SERPL-MCNC: 153 MG/DL (ref 0–149)
WBC # BLD AUTO: 13.8 K/UL (ref 4.8–10.8)

## 2017-09-23 PROCEDURE — 700105 HCHG RX REV CODE 258: Performed by: STUDENT IN AN ORGANIZED HEALTH CARE EDUCATION/TRAINING PROGRAM

## 2017-09-23 PROCEDURE — A9270 NON-COVERED ITEM OR SERVICE: HCPCS | Performed by: STUDENT IN AN ORGANIZED HEALTH CARE EDUCATION/TRAINING PROGRAM

## 2017-09-23 PROCEDURE — 94668 MNPJ CHEST WALL SBSQ: CPT

## 2017-09-23 PROCEDURE — 83735 ASSAY OF MAGNESIUM: CPT

## 2017-09-23 PROCEDURE — 71010 DX-CHEST-PORTABLE (1 VIEW): CPT

## 2017-09-23 PROCEDURE — 700102 HCHG RX REV CODE 250 W/ 637 OVERRIDE(OP): Performed by: STUDENT IN AN ORGANIZED HEALTH CARE EDUCATION/TRAINING PROGRAM

## 2017-09-23 PROCEDURE — 700105 HCHG RX REV CODE 258: Performed by: INTERNAL MEDICINE

## 2017-09-23 PROCEDURE — 94660 CPAP INITIATION&MGMT: CPT

## 2017-09-23 PROCEDURE — A9270 NON-COVERED ITEM OR SERVICE: HCPCS | Performed by: INTERNAL MEDICINE

## 2017-09-23 PROCEDURE — 770020 HCHG ROOM/CARE - TELE (206)

## 2017-09-23 PROCEDURE — 84478 ASSAY OF TRIGLYCERIDES: CPT

## 2017-09-23 PROCEDURE — 80048 BASIC METABOLIC PNL TOTAL CA: CPT

## 2017-09-23 PROCEDURE — 85025 COMPLETE CBC W/AUTO DIFF WBC: CPT

## 2017-09-23 PROCEDURE — 700101 HCHG RX REV CODE 250: Performed by: STUDENT IN AN ORGANIZED HEALTH CARE EDUCATION/TRAINING PROGRAM

## 2017-09-23 PROCEDURE — 700102 HCHG RX REV CODE 250 W/ 637 OVERRIDE(OP): Performed by: INTERNAL MEDICINE

## 2017-09-23 PROCEDURE — 700111 HCHG RX REV CODE 636 W/ 250 OVERRIDE (IP): Performed by: STUDENT IN AN ORGANIZED HEALTH CARE EDUCATION/TRAINING PROGRAM

## 2017-09-23 PROCEDURE — 84100 ASSAY OF PHOSPHORUS: CPT

## 2017-09-23 RX ORDER — FAMOTIDINE 20 MG/1
20 TABLET, FILM COATED ORAL 2 TIMES DAILY
Status: DISCONTINUED | OUTPATIENT
Start: 2017-09-23 | End: 2017-09-25 | Stop reason: HOSPADM

## 2017-09-23 RX ORDER — TAMSULOSIN HYDROCHLORIDE 0.4 MG/1
0.4 CAPSULE ORAL
Status: DISCONTINUED | OUTPATIENT
Start: 2017-09-23 | End: 2017-09-25 | Stop reason: HOSPADM

## 2017-09-23 RX ORDER — ATORVASTATIN CALCIUM 20 MG/1
20 TABLET, FILM COATED ORAL NIGHTLY
Status: DISCONTINUED | OUTPATIENT
Start: 2017-09-23 | End: 2017-09-25 | Stop reason: HOSPADM

## 2017-09-23 RX ORDER — GABAPENTIN 300 MG/1
300 CAPSULE ORAL 3 TIMES DAILY
Status: DISCONTINUED | OUTPATIENT
Start: 2017-09-23 | End: 2017-09-25 | Stop reason: HOSPADM

## 2017-09-23 RX ORDER — MORPHINE SULFATE 4 MG/ML
4 INJECTION, SOLUTION INTRAMUSCULAR; INTRAVENOUS EVERY 4 HOURS PRN
Status: DISCONTINUED | OUTPATIENT
Start: 2017-09-23 | End: 2017-09-25 | Stop reason: HOSPADM

## 2017-09-23 RX ORDER — FAMOTIDINE 20 MG/1
20 TABLET, FILM COATED ORAL 2 TIMES DAILY
Status: DISCONTINUED | OUTPATIENT
Start: 2017-09-23 | End: 2017-09-23

## 2017-09-23 RX ORDER — OXYCODONE HYDROCHLORIDE 10 MG/1
10 TABLET ORAL EVERY 4 HOURS PRN
Status: DISCONTINUED | OUTPATIENT
Start: 2017-09-23 | End: 2017-09-25 | Stop reason: HOSPADM

## 2017-09-23 RX ORDER — OXYCODONE HYDROCHLORIDE 5 MG/1
5 TABLET ORAL EVERY 4 HOURS PRN
Status: DISCONTINUED | OUTPATIENT
Start: 2017-09-23 | End: 2017-09-25 | Stop reason: HOSPADM

## 2017-09-23 RX ORDER — MAGNESIUM SULFATE 1 G/100ML
1 INJECTION INTRAVENOUS ONCE
Status: COMPLETED | OUTPATIENT
Start: 2017-09-23 | End: 2017-09-23

## 2017-09-23 RX ORDER — POTASSIUM CHLORIDE 7.45 MG/ML
10 INJECTION INTRAVENOUS
Status: COMPLETED | OUTPATIENT
Start: 2017-09-23 | End: 2017-09-23

## 2017-09-23 RX ORDER — AMLODIPINE BESYLATE 5 MG/1
5 TABLET ORAL DAILY
Status: DISCONTINUED | OUTPATIENT
Start: 2017-09-23 | End: 2017-09-24

## 2017-09-23 RX ORDER — LISINOPRIL 5 MG/1
5 TABLET ORAL DAILY
Status: DISCONTINUED | OUTPATIENT
Start: 2017-09-23 | End: 2017-09-24

## 2017-09-23 RX ORDER — ACETAMINOPHEN 325 MG/1
650 TABLET ORAL EVERY 4 HOURS PRN
Status: DISCONTINUED | OUTPATIENT
Start: 2017-09-23 | End: 2017-09-25 | Stop reason: HOSPADM

## 2017-09-23 RX ADMIN — GABAPENTIN 300 MG: 300 CAPSULE ORAL at 20:23

## 2017-09-23 RX ADMIN — POTASSIUM CHLORIDE 10 MEQ: 7.46 INJECTION, SOLUTION INTRAVENOUS at 12:26

## 2017-09-23 RX ADMIN — LISINOPRIL 5 MG: 5 TABLET ORAL at 08:54

## 2017-09-23 RX ADMIN — FAMOTIDINE 20 MG: 20 TABLET, FILM COATED ORAL at 08:39

## 2017-09-23 RX ADMIN — METOPROLOL TARTRATE 25 MG: 25 TABLET, FILM COATED ORAL at 08:39

## 2017-09-23 RX ADMIN — POTASSIUM PHOSPHATE, MONOBASIC AND POTASSIUM PHOSPHATE, DIBASIC 30 MMOL: 224; 236 INJECTION, SOLUTION INTRAVENOUS at 10:06

## 2017-09-23 RX ADMIN — AMLODIPINE BESYLATE 5 MG: 5 TABLET ORAL at 08:39

## 2017-09-23 RX ADMIN — SODIUM CHLORIDE: 9 INJECTION, SOLUTION INTRAVENOUS at 00:30

## 2017-09-23 RX ADMIN — ENOXAPARIN SODIUM 40 MG: 100 INJECTION SUBCUTANEOUS at 10:12

## 2017-09-23 RX ADMIN — GABAPENTIN 300 MG: 300 CAPSULE ORAL at 08:41

## 2017-09-23 RX ADMIN — TAMSULOSIN HYDROCHLORIDE 0.4 MG: 0.4 CAPSULE ORAL at 08:39

## 2017-09-23 RX ADMIN — POTASSIUM CHLORIDE 10 MEQ: 7.46 INJECTION, SOLUTION INTRAVENOUS at 11:35

## 2017-09-23 RX ADMIN — ATORVASTATIN CALCIUM 20 MG: 20 TABLET, FILM COATED ORAL at 20:23

## 2017-09-23 RX ADMIN — MAGNESIUM SULFATE HEPTAHYDRATE 1 G: 1 INJECTION, SOLUTION INTRAVENOUS at 08:50

## 2017-09-23 RX ADMIN — STANDARDIZED SENNA CONCENTRATE AND DOCUSATE SODIUM 2 TABLET: 8.6; 5 TABLET, FILM COATED ORAL at 08:39

## 2017-09-23 RX ADMIN — FAMOTIDINE 20 MG: 20 TABLET, FILM COATED ORAL at 20:24

## 2017-09-23 RX ADMIN — POTASSIUM CHLORIDE 10 MEQ: 7.46 INJECTION, SOLUTION INTRAVENOUS at 08:41

## 2017-09-23 RX ADMIN — METOPROLOL TARTRATE 25 MG: 25 TABLET, FILM COATED ORAL at 20:24

## 2017-09-23 RX ADMIN — GABAPENTIN 300 MG: 300 CAPSULE ORAL at 14:42

## 2017-09-23 ASSESSMENT — PAIN SCALES - GENERAL
PAINLEVEL_OUTOF10: 0

## 2017-09-23 ASSESSMENT — ENCOUNTER SYMPTOMS
SHORTNESS OF BREATH: 0
CONSTIPATION: 0
COUGH: 0
CHILLS: 0
ABDOMINAL PAIN: 0
DIARRHEA: 0
FEVER: 0
DOUBLE VISION: 0
HEADACHES: 0
VOMITING: 0
BLURRED VISION: 0
PHOTOPHOBIA: 0
SPUTUM PRODUCTION: 0
PALPITATIONS: 0

## 2017-09-23 ASSESSMENT — COGNITIVE AND FUNCTIONAL STATUS - GENERAL
SUGGESTED CMS G CODE MODIFIER MOBILITY: CL
TOILETING: A LOT
EATING MEALS: A LOT
MOVING TO AND FROM BED TO CHAIR: A LOT
HELP NEEDED FOR BATHING: A LOT
DRESSING REGULAR UPPER BODY CLOTHING: A LOT
WALKING IN HOSPITAL ROOM: A LOT
STANDING UP FROM CHAIR USING ARMS: A LOT
MOVING FROM LYING ON BACK TO SITTING ON SIDE OF FLAT BED: A LOT
DAILY ACTIVITIY SCORE: 12
PERSONAL GROOMING: A LOT
DRESSING REGULAR LOWER BODY CLOTHING: A LOT
CLIMB 3 TO 5 STEPS WITH RAILING: A LOT
MOBILITY SCORE: 12
SUGGESTED CMS G CODE MODIFIER DAILY ACTIVITY: CL
TURNING FROM BACK TO SIDE WHILE IN FLAT BAD: A LOT

## 2017-09-23 ASSESSMENT — LIFESTYLE VARIABLES
DO YOU DRINK ALCOHOL: YES
EVER_SMOKED: YES
CONSUMPTION TOTAL: NEGATIVE
TOTAL SCORE: 0
AVERAGE NUMBER OF DAYS PER WEEK YOU HAVE A DRINK CONTAINING ALCOHOL: 5
HAVE YOU EVER FELT YOU SHOULD CUT DOWN ON YOUR DRINKING: NO
EVER FELT BAD OR GUILTY ABOUT YOUR DRINKING: NO
HOW MANY TIMES IN THE PAST YEAR HAVE YOU HAD 5 OR MORE DRINKS IN A DAY: 0
TOTAL SCORE: 0
ON A TYPICAL DAY WHEN YOU DRINK ALCOHOL HOW MANY DRINKS DO YOU HAVE: 2
HAVE PEOPLE ANNOYED YOU BY CRITICIZING YOUR DRINKING: NO
ALCOHOL_USE: NO
TOTAL SCORE: 0
EVER HAD A DRINK FIRST THING IN THE MORNING TO STEADY YOUR NERVES TO GET RID OF A HANGOVER: NO

## 2017-09-23 NOTE — PROGRESS NOTES
"Surgical Progress Note:    Extubated  Awake, alert  No pain  Has bowel function     PE:  /65   Pulse 73   Temp (!) 34.7 °C (94.5 °F)   Resp 20   Ht 1.753 m (5' 9\")   Wt 107.1 kg (236 lb 1.8 oz)   SpO2 98%   BMI 34.87 kg/m²     I/O:   Intake/Output Summary (Last 24 hours) at 09/23/17 1226  Last data filed at 09/23/17 1100   Gross per 24 hour   Intake             2250 ml   Output             4735 ml   Net            -2485 ml     UOP:  PO:  IV:    GEN: NAD  COR: RRR  PULM: CTA  ABD: soft, ND, dressings c/d, ostomy pink and productive of gas and liquid stool      Labs:  Recent Labs      09/21/17   1022  09/21/17   1744  09/22/17   0215 09/23/17   0615   WBC  12.4*  19.3*  17.0*  13.8*   RBC  2.82*  3.59*  3.23*  2.80*   HEMOGLOBIN  8.5*  10.8*  9.8*  8.5*   HEMATOCRIT  28.3*  31.9*  28.3*  25.7*   MCV  100.4*  88.9  87.6  91.8   MCH  30.1  30.1  30.3  30.4   RDW  59.3*  53.9*  53.7*  59.1*   PLATELETCT  154*  170  160*  135*   MPV  9.0  9.1  9.4  9.2   NEUTSPOLYS  76.10*   --   83.80*  75.40*   LYMPHOCYTES  15.00*   --   7.30*  10.60*   MONOCYTES  6.50   --   8.20  10.50   EOSINOPHILS  0.90   --   0.00  2.80   BASOPHILS  0.20   --   0.10  0.30     Recent Labs      09/21/17   1022  09/22/17   0215  09/23/17   0615   SODIUM  134*  138  138   POTASSIUM  5.2  3.8  3.6   CHLORIDE  117*  107  111   CO2  13*  23  20   GLUCOSE  188*  123*  94   BUN  10  9  4*         A/P:   S/P colectomy/end ileostomy for GI bleed  Doing well  Bowel function returned, ok for clears and advancing diet  Ambulate  Monitor ostomy output     Fan Borjas M.D.  [facility]  [phone]    "

## 2017-09-23 NOTE — PROGRESS NOTES
Bedside report received from night shift RN, patient A+Ox4, awake and alert, speaking in full sentences. VSS, no chest pain, no SOB, no nausea. Using call bell approp in room R-107. ICU care plan. Bed locked and in lowest position, frequent rounding in place.

## 2017-09-23 NOTE — CARE PLAN
"Problem: Infection  Goal: Will remain free from infection  Outcome: PROGRESSING AS EXPECTED  VSS, afebrile, pt feels \"fine,\" no s/s infection at this time      "

## 2017-09-23 NOTE — CARE PLAN
Problem: Venous Thromboembolism (VTW)/Deep Vein Thrombosis (DVT) Prevention:  Goal: Patient will participate in Venous Thrombosis (VTE)/Deep Vein Thrombosis (DVT)Prevention Measures  Outcome: PROGRESSING AS EXPECTED      Problem: Pain Management  Goal: Pain level will decrease to patient's comfort goal  Outcome: PROGRESSING AS EXPECTED  Patient denies complaint of pain at this time. RN to reassess Q 4 hour and prn during hourly rounding.     Problem: Respiratory:  Goal: Respiratory status will improve  Outcome: PROGRESSING AS EXPECTED    Intervention: Administer and titrate oxygen therapy  Patient extubated today at 1200. Patient sating at 95% on 2 L NC.

## 2017-09-23 NOTE — CARE PLAN
Problem: Oxygenation:  Goal: Maintain adequate oxygenation dependent on patient condition  Outcome: PROGRESSING AS EXPECTED  Pt on 2 liters NC and on home cpap unit, unknown settings with o2 bleed in.

## 2017-09-23 NOTE — FLOWSHEET NOTE
09/22/17 1946   Events/Summary/Plan   Events/Summary/Plan pep and IS done pt doing well    Interdisciplinary Plan of Care-Goals (Indications)   Hyperinflation Protocol Indications Pre or Post-op Abdominal, Thoracic or Orthopedic Surgery   Interdisciplinary Plan of Care-Outcomes    Hyperinflation Protocol Goals/Outcome Greater Than 60% of Predicted I.S. Volume x 24 hrs   Education   Education Yes - Pt. / Family has been Instructed in Trach Care   PEP/CPT Group   PEP/CPT/Airway Clearance Therapy Yes   #PEP/CPT (Manual) Subsequent Subsequent   PEP/CPT Method Positive Airway Pressure Device   Date Disposable Equipment Last Changed 09/22/17   Date Disposable Equipment Next Change Due (Q 30 Days) 10/22/17   Incentive Spirometry Group   Incentive Spirometry Instruction Yes   Breathing Exercises Yes   Incentive Spirometer Volume 1750 mL  (2644-8514 at best )   Incentive Spirometer Date Last Changed 09/22/17   Incentive Spirometer Next Change Date (Q 30 Days) 10/22/17   Respiratory WDL   Respiratory (WDL) X   Chest Exam   Work Of Breathing / Effort Mild   Respiration (!) 25   Pulse 96   Heart Rate (Monitored) 96   Breath Sounds   Pre/Post Intervention Pre Intervention Assessment   RUL Breath Sounds Clear   RML Breath Sounds Clear;Diminished   RLL Breath Sounds Diminished   ADORE Breath Sounds Clear   LLL Breath Sounds Diminished   Oxygen   Pulse Oximetry 96 %   O2 (LPM) 1   O2 Daily Delivery Respiratory  Nasal Cannula

## 2017-09-23 NOTE — PROGRESS NOTES
Internal Medicine Interval Note    Name Jamil Kendall       1935   Age/Sex 82 y.o. male   MRN [id number]   Code Status FULL CODE     After 5PM or if no immediate response to page, please call for cross-coverage  Attending/Team: Dr. Gonda / SOPHIA Herndon See Patient List for primary contact information  Call [phone] to page    1st Call - Day Intern (R1):   Dr. Boyd 2nd Call - Day Sr. Resident (R2/R3):   Dr. Charles         Reason for interval visit  (Principal Problem)     Emergency Sub-total Colectomy and end ileostomy for LGI Bleed    Interval Problem Daily Status Update  (24 hours)     - s/p total colectomy and ileostomy  for hemorrhagic shock due to lower GI bleed  - progressing well  - off inotropes and extubated  - CPAP at night, maintaining sats with 1L via nasal canula  - no nausea / vomiting  - ileostomy working, bowel sounds+  - not in pain  Plan  - Mobilize out of bed today  - Remove Dillon  - Reduce TIM  - Start clear fluids, sips to start with and proceed as tolerated  - Lovenox for DVT prophylaxis  - Electrolyte repletion  - home meds, convert hospital IV meds to PO      Review of Systems   Constitutional: Negative for chills and fever.   Eyes: Negative for blurred vision, double vision and photophobia.   Respiratory: Negative for cough, sputum production and shortness of breath.    Cardiovascular: Negative for chest pain, palpitations and leg swelling.   Gastrointestinal: Negative for abdominal pain, constipation, diarrhea and vomiting.   Neurological: Negative for headaches.       Consultants/Specialty  Dr. Wagner - Surgery    Disposition  Transfer to floor    Quality Measures    Reviewed items::  Labs reviewed and Medications reviewed  Dillon Catheter: to be removed today.  : to remove today.  DVT: start Lovenox 40 mg today.  DVT prophylaxis - mechanical:  SCDs  Ulcer Prophylaxis::  Yes          Physical Exam       Vitals:    17 0400 17 0500 17 0600  09/23/17 0721   BP:       Pulse: 80 83 81 86   Resp: (!) 21 (!) 23 20 18   Temp:       SpO2: 99% 98% 99% 94%   Weight:   107.1 kg (236 lb 1.8 oz)    Height:         Body mass index is 34.87 kg/m². Weight: 107.1 kg (236 lb 1.8 oz)  Oxygen Therapy:  Pulse Oximetry: 94 %, O2 (LPM): 0, FIO2%: 2, O2 Delivery: Silicone Nasal Cannula    Physical Exam   Constitutional: He is oriented to person, place, and time and well-developed, well-nourished, and in no distress. No distress.   HENT:   Head: Normocephalic and atraumatic.   Eyes: Conjunctivae and EOM are normal. Pupils are equal, round, and reactive to light.   Neck: Normal range of motion. Neck supple.   Cardiovascular: Normal rate, regular rhythm and normal heart sounds.    No murmur heard.  Pulmonary/Chest: Effort normal and breath sounds normal. No respiratory distress. He has no wheezes. He has no rales.   Abdominal: Soft. Bowel sounds are normal. He exhibits no distension. There is no tenderness. There is no rebound and no guarding.   Bowel sounds present. Dressing clean  Ileostomy looks pink, functioning   Musculoskeletal: He exhibits no edema.   Neurological: He is alert and oriented to person, place, and time.   Skin: He is not diaphoretic.         Lab Data Review:         9/22/2017  10:42 AM    Recent Labs      09/21/17   1022  09/22/17 0215 09/23/17   0615   SODIUM  134*  138  138   POTASSIUM  5.2  3.8  3.6   CHLORIDE  117*  107  111   CO2  13*  23  20   BUN  10  9  4*   CREATININE  0.41*  0.53  0.43*   MAGNESIUM   --   1.6  1.9   PHOSPHORUS   --   2.9  1.7*   CALCIUM  4.8*  7.5*  7.5*       Recent Labs      09/21/17   1022  09/22/17   0215  09/23/17   0615   ALTSGPT  16   --    --    ASTSGOT  13   --    --    ALKPHOSPHAT  29*   --    --    TBILIRUBIN  0.4   --    --    GLUCOSE  188*  123*  94       Recent Labs      09/21/17   1022  09/21/17   1744  09/22/17 0215 09/23/17   0615   RBC  2.82*  3.59*  3.23*  2.80*   HEMOGLOBIN  8.5*  10.8*  9.8*  8.5*    HEMATOCRIT  28.3*  31.9*  28.3*  25.7*   PLATELETCT  154*  170  160*  135*   PROTHROMBTM  18.3*  14.6   --    --    APTT  29.0  28.5   --    --    INR  1.47*  1.10   --    --        Recent Labs      09/21/17   1022  09/21/17   1744  09/22/17   0215  09/23/17   0615   WBC  12.4*  19.3*  17.0*  13.8*   NEUTSPOLYS  76.10*   --   83.80*  75.40*   LYMPHOCYTES  15.00*   --   7.30*  10.60*   MONOCYTES  6.50   --   8.20  10.50   EOSINOPHILS  0.90   --   0.00  2.80   BASOPHILS  0.20   --   0.10  0.30   ASTSGOT  13   --    --    --    ALTSGPT  16   --    --    --    ALKPHOSPHAT  29*   --    --    --    TBILIRUBIN  0.4   --    --    --            Assessment/Plan     * GI bleed   Assessment & Plan    - BRBPR, causing hemodynamic instability  - CTA abdomen - no bleeding point identified , hence unable to embolize  - Had emergency sub-total colectomy and ileostomy 9/21  - Wound dressing clean  - Ileostomy pink, functioning  - no pain, no tenderness  - Hb 8.5 today 9/23  Plan  - Pepcid PO  - For CBC QDaily            S/P total colectomy   Assessment & Plan    - BRBPR, causing hemodynamic instability  - CTA abdomen - no bleeding point identified , hence unable to embolize  - Had emergency colectomy and ileostomy 9/21  - Ileostomy pink, functioning, BS +  Plan  - Pepcid PO  - CBC Q daily  - Ambulate  - reduce maintenance TIM 83 ml/hr  - clear fluids, to start with sips as tolerated  - PO home meds  - Lovenox for DVT prophylaxis          S/P ileostomy (CMS-HCC)   Assessment & Plan    - colectomy and ileostomy 9/21  - ileostomy looking pink, functioning  - stoma care        Acute hypoxemic respiratory failure (CMS-HCC)   Assessment & Plan    - Extubated 24 hrs ago, progressing well        Hypomagnesemia   Assessment & Plan    - Mag 1.9  - for 1 gm repletion IV        Hypokalemia   Assessment & Plan    - K 3.6  - Mg 1.9  - Phos 1.7  Plan  - Replete all K 30, K-phos 30 mmol, Mg 1 gm

## 2017-09-23 NOTE — PROGRESS NOTES
Central lines/ A line removed as ordered. Patient tolerating clear liquid diet, denies pain or nausea. Dillon removed at 1115, due to void [id number]. Wife at bedside.

## 2017-09-23 NOTE — CARE PLAN
Problem: Hyperinflation:  Goal: Prevent or improve atelectasis  Outcome: PROGRESSING AS EXPECTED  Pt continues with PEP QID and IS, he is pulling at best 750.  Predicted is 2750/1650/5858=975 to 800

## 2017-09-23 NOTE — CARE PLAN
Problem: Safety  Goal: Will remain free from injury  Outcome: PROGRESSING AS EXPECTED  Bed alarm on, in , across from nurses station, pt follows commands and calls for assistance

## 2017-09-23 NOTE — FLOWSHEET NOTE
09/23/17 0237   Events/Summary/Plan   Events/Summary/Plan (pt resting well on home cpap unit )   General Vent Information   Pulse Oximetry 99 %   Heart Rate (Monitored) 82   CPAP/BiPAP [facility]   Nocturnal CPAP or BiPAP CPAP   #System Evaluation Yes   Home Unit Used? Yes   Equipment Inspected for Cleanliness, Operation, and Safety? Yes   FiO2 or LPM 2   Home Mask Used? Yes

## 2017-09-24 LAB
ANION GAP SERPL CALC-SCNC: 7 MMOL/L (ref 0–11.9)
BASOPHILS # BLD AUTO: 0.3 % (ref 0–1.8)
BASOPHILS # BLD: 0.04 K/UL (ref 0–0.12)
BUN SERPL-MCNC: 4 MG/DL (ref 8–22)
CALCIUM SERPL-MCNC: 8 MG/DL (ref 8.5–10.5)
CHLORIDE SERPL-SCNC: 109 MMOL/L (ref 96–112)
CO2 SERPL-SCNC: 22 MMOL/L (ref 20–33)
CREAT SERPL-MCNC: 0.45 MG/DL (ref 0.5–1.4)
EOSINOPHIL # BLD AUTO: 0.59 K/UL (ref 0–0.51)
EOSINOPHIL NFR BLD: 5.1 % (ref 0–6.9)
ERYTHROCYTE [DISTWIDTH] IN BLOOD BY AUTOMATED COUNT: 58.2 FL (ref 35.9–50)
GFR SERPL CREATININE-BSD FRML MDRD: >60 ML/MIN/1.73 M 2
GLUCOSE BLD-MCNC: 112 MG/DL (ref 65–99)
GLUCOSE BLD-MCNC: 116 MG/DL (ref 65–99)
GLUCOSE SERPL-MCNC: 106 MG/DL (ref 65–99)
HCT VFR BLD AUTO: 26 % (ref 42–52)
HGB BLD-MCNC: 8.3 G/DL (ref 14–18)
IMM GRANULOCYTES # BLD AUTO: 0.07 K/UL (ref 0–0.11)
IMM GRANULOCYTES NFR BLD AUTO: 0.6 % (ref 0–0.9)
LYMPHOCYTES # BLD AUTO: 1.33 K/UL (ref 1–4.8)
LYMPHOCYTES NFR BLD: 11.5 % (ref 22–41)
MAGNESIUM SERPL-MCNC: 2.1 MG/DL (ref 1.5–2.5)
MCH RBC QN AUTO: 29.6 PG (ref 27–33)
MCHC RBC AUTO-ENTMCNC: 31.9 G/DL (ref 33.7–35.3)
MCV RBC AUTO: 92.9 FL (ref 81.4–97.8)
MONOCYTES # BLD AUTO: 1.26 K/UL (ref 0–0.85)
MONOCYTES NFR BLD AUTO: 10.9 % (ref 0–13.4)
NEUTROPHILS # BLD AUTO: 8.28 K/UL (ref 1.82–7.42)
NEUTROPHILS NFR BLD: 71.6 % (ref 44–72)
NRBC # BLD AUTO: 0 K/UL
NRBC BLD AUTO-RTO: 0 /100 WBC
PHOSPHATE SERPL-MCNC: 2.8 MG/DL (ref 2.5–4.5)
PLATELET # BLD AUTO: 153 K/UL (ref 164–446)
PMV BLD AUTO: 9.2 FL (ref 9–12.9)
POTASSIUM SERPL-SCNC: 3.8 MMOL/L (ref 3.6–5.5)
RBC # BLD AUTO: 2.8 M/UL (ref 4.7–6.1)
SODIUM SERPL-SCNC: 138 MMOL/L (ref 135–145)
WBC # BLD AUTO: 11.6 K/UL (ref 4.8–10.8)

## 2017-09-24 PROCEDURE — 84100 ASSAY OF PHOSPHORUS: CPT

## 2017-09-24 PROCEDURE — 700111 HCHG RX REV CODE 636 W/ 250 OVERRIDE (IP): Performed by: STUDENT IN AN ORGANIZED HEALTH CARE EDUCATION/TRAINING PROGRAM

## 2017-09-24 PROCEDURE — 94660 CPAP INITIATION&MGMT: CPT

## 2017-09-24 PROCEDURE — 700105 HCHG RX REV CODE 258: Performed by: STUDENT IN AN ORGANIZED HEALTH CARE EDUCATION/TRAINING PROGRAM

## 2017-09-24 PROCEDURE — 302106 OSTOMY POWDER: Performed by: SURGERY

## 2017-09-24 PROCEDURE — A9270 NON-COVERED ITEM OR SERVICE: HCPCS | Performed by: STUDENT IN AN ORGANIZED HEALTH CARE EDUCATION/TRAINING PROGRAM

## 2017-09-24 PROCEDURE — 85025 COMPLETE CBC W/AUTO DIFF WBC: CPT

## 2017-09-24 PROCEDURE — 80048 BASIC METABOLIC PNL TOTAL CA: CPT

## 2017-09-24 PROCEDURE — 94668 MNPJ CHEST WALL SBSQ: CPT

## 2017-09-24 PROCEDURE — 36415 COLL VENOUS BLD VENIPUNCTURE: CPT

## 2017-09-24 PROCEDURE — 302102 BAG OST N IMG 2.25IN 2PC (FECAL): Performed by: SURGERY

## 2017-09-24 PROCEDURE — 82962 GLUCOSE BLOOD TEST: CPT | Mod: 91

## 2017-09-24 PROCEDURE — 770020 HCHG ROOM/CARE - TELE (206)

## 2017-09-24 PROCEDURE — 94760 N-INVAS EAR/PLS OXIMETRY 1: CPT

## 2017-09-24 PROCEDURE — 83735 ASSAY OF MAGNESIUM: CPT

## 2017-09-24 PROCEDURE — 302111 WAFER OST 2.25IN N IMG RD 2 PC (BARRIER): Performed by: SURGERY

## 2017-09-24 PROCEDURE — 700102 HCHG RX REV CODE 250 W/ 637 OVERRIDE(OP): Performed by: STUDENT IN AN ORGANIZED HEALTH CARE EDUCATION/TRAINING PROGRAM

## 2017-09-24 RX ORDER — METOPROLOL SUCCINATE 25 MG/1
12.5 TABLET, EXTENDED RELEASE ORAL
Status: DISCONTINUED | OUTPATIENT
Start: 2017-09-25 | End: 2017-09-25

## 2017-09-24 RX ADMIN — TAMSULOSIN HYDROCHLORIDE 0.4 MG: 0.4 CAPSULE ORAL at 08:48

## 2017-09-24 RX ADMIN — ATORVASTATIN CALCIUM 20 MG: 20 TABLET, FILM COATED ORAL at 20:02

## 2017-09-24 RX ADMIN — LISINOPRIL 5 MG: 5 TABLET ORAL at 08:48

## 2017-09-24 RX ADMIN — GABAPENTIN 300 MG: 300 CAPSULE ORAL at 08:48

## 2017-09-24 RX ADMIN — METOPROLOL TARTRATE 25 MG: 25 TABLET, FILM COATED ORAL at 08:48

## 2017-09-24 RX ADMIN — FAMOTIDINE 20 MG: 20 TABLET, FILM COATED ORAL at 20:02

## 2017-09-24 RX ADMIN — SODIUM CHLORIDE: 9 INJECTION, SOLUTION INTRAVENOUS at 02:58

## 2017-09-24 RX ADMIN — ENOXAPARIN SODIUM 40 MG: 100 INJECTION SUBCUTANEOUS at 08:47

## 2017-09-24 RX ADMIN — AMLODIPINE BESYLATE 5 MG: 5 TABLET ORAL at 08:48

## 2017-09-24 RX ADMIN — SODIUM CHLORIDE: 9 INJECTION, SOLUTION INTRAVENOUS at 16:40

## 2017-09-24 RX ADMIN — FAMOTIDINE 20 MG: 20 TABLET, FILM COATED ORAL at 08:48

## 2017-09-24 RX ADMIN — GABAPENTIN 300 MG: 300 CAPSULE ORAL at 20:03

## 2017-09-24 RX ADMIN — GABAPENTIN 300 MG: 300 CAPSULE ORAL at 14:55

## 2017-09-24 ASSESSMENT — LIFESTYLE VARIABLES
TOTAL SCORE: 0
HAVE YOU EVER FELT YOU SHOULD CUT DOWN ON YOUR DRINKING: NO
ON A TYPICAL DAY WHEN YOU DRINK ALCOHOL HOW MANY DRINKS DO YOU HAVE: 3
HOW MANY TIMES IN THE PAST YEAR HAVE YOU HAD 5 OR MORE DRINKS IN A DAY: 0
TOTAL SCORE: 0
ALCOHOL_USE: YES
HAVE PEOPLE ANNOYED YOU BY CRITICIZING YOUR DRINKING: NO
EVER FELT BAD OR GUILTY ABOUT YOUR DRINKING: NO
EVER_SMOKED: YES
AVERAGE NUMBER OF DAYS PER WEEK YOU HAVE A DRINK CONTAINING ALCOHOL: 7
EVER HAD A DRINK FIRST THING IN THE MORNING TO STEADY YOUR NERVES TO GET RID OF A HANGOVER: NO
CONSUMPTION TOTAL: POSITIVE
TOTAL SCORE: 0

## 2017-09-24 ASSESSMENT — COGNITIVE AND FUNCTIONAL STATUS - GENERAL
SUGGESTED CMS G CODE MODIFIER MOBILITY: CL
PERSONAL GROOMING: A LOT
MOBILITY SCORE: 12
WALKING IN HOSPITAL ROOM: A LOT
EATING MEALS: A LOT
MOVING TO AND FROM BED TO CHAIR: A LOT
HELP NEEDED FOR BATHING: A LOT
DRESSING REGULAR UPPER BODY CLOTHING: A LOT
TURNING FROM BACK TO SIDE WHILE IN FLAT BAD: A LOT
DAILY ACTIVITIY SCORE: 12
DRESSING REGULAR LOWER BODY CLOTHING: A LOT
TOILETING: A LOT
SUGGESTED CMS G CODE MODIFIER DAILY ACTIVITY: CL
MOVING FROM LYING ON BACK TO SITTING ON SIDE OF FLAT BED: A LOT
STANDING UP FROM CHAIR USING ARMS: A LOT
CLIMB 3 TO 5 STEPS WITH RAILING: A LOT

## 2017-09-24 ASSESSMENT — PAIN SCALES - GENERAL
PAINLEVEL_OUTOF10: 0

## 2017-09-24 ASSESSMENT — PATIENT HEALTH QUESTIONNAIRE - PHQ9
1. LITTLE INTEREST OR PLEASURE IN DOING THINGS: NOT AT ALL
SUM OF ALL RESPONSES TO PHQ9 QUESTIONS 1 AND 2: 0
2. FEELING DOWN, DEPRESSED, IRRITABLE, OR HOPELESS: NOT AT ALL
SUM OF ALL RESPONSES TO PHQ QUESTIONS 1-9: 0

## 2017-09-24 NOTE — PROGRESS NOTES
Internal Medicine Interval Note    Name Jamil Kendall       1935   Age/Sex 82 y.o. male   MRN [id number]   Code Status FULL     After 5PM or if no immediate response to page, please call for cross-coverage  Attending/Team: Dr. Garrison/Chaim See Patient List for primary contact information  Call [phone] to page    1st Call - Day Intern (R1):   Dr. Villalba 2nd Call - Day Sr. Resident (R2/R3):   Dr. Champagne         Reason for interval visit  (Principal Problem)   GI bleed    Interval Problem Daily Status Update  (24 hours)   ICU transfer. [facility]  with BRBPR x 2 and Hb of 12. In ED, had another episode of BRBPR and became hypotensive. Given 3u or PRBCs and intubated then transferred to [facility]. Hgb at [facility] was 8.5 despite 3u of blood. CTA abdomen found no active bleeding source. Dr. Wagner  on board. Pt was transferred to OR for sub-total colectomy and end ileostomy on  then transferred to ICU.     Inotropic support (levophed) stopped on  on MAR  Extubated on     Pt was seen and examined at bedside. No acute distress, resting comfortably. CPAP mask on. Ileostomy functioning well. Tolerating clear liquids.     Hypotensive to SBPs in 90s overnight. PO amlodipine and lisinopril held. Toprol dose reduced to 12.5 daily with hold parameters once SBP improved to >120.     ROS  Constitutional: Negative for chills and fever.   Respiratory: Negative for cough, sputum production and shortness of breath.    Cardiovascular: Negative for chest pain, palpitations and leg swelling.   Gastrointestinal : Positive for abdominal pain, + ileostomy bag. Negative for vomiting.   Neurological: Negative for headaches.     Consultants/Specialty  Dr. Wagner - surgery    Disposition  Inpatient    Quality Measures    Reviewed items::  Labs reviewed, Medications reviewed and Radiology images reviewed  Dillon catheter::  No Dillon  DVT prophylaxis pharmacological::  Enoxaparin (Lovenox)  DVT  "prophylaxis - mechanical:  SCDs  Ulcer Prophylaxis::  Yes          Physical Exam       Vitals:    09/23/17 2000 09/24/17 0027 09/24/17 0330 09/24/17 0400   BP: 100/55 (!) 91/57 (!) 97/65    Pulse: 87 74 68    Resp: 20 18 18    Temp: 37.6 °C (99.6 °F) 37 °C (98.6 °F) 37.1 °C (98.7 °F)    SpO2: 96% 93% 95%    Weight: 107.5 kg (236 lb 15.9 oz)   107 kg (236 lb)   Height: 1.753 m (5' 9\")   1.753 m (5' 9\")     Body mass index is 34.85 kg/m². Weight: 107 kg (236 lb)  Oxygen Therapy:  Pulse Oximetry: 95 %, O2 (LPM): 2, O2 Delivery: CPAP    Physical Exam  Constitutional: NAD. Resting comfortably in bed.   HENT:   Eyes: Conjunctivae and EOM are normal.  Neck: Normal range of motion. Neck supple.   Cardiovascular: Normal rate, regular rhythm and normal heart sounds.    No murmur heard.  Pulmonary/Chest: Effort normal and breath sounds normal. No respiratory distress. He has no wheezes. He has no rales.   Abdominal: Soft. Bowel sounds are normal. He exhibits no distension. There is no tenderness. There is no rebound and no guarding.   +TTP of L abdomen, near midline scar. +BS. Dressing clean  Ileostomy bag filled with brown stool.  Musculoskeletal: He exhibits no edema.   Neurological: He is alert and oriented to person, place, and time.       Lab Data Review:         9/24/2017  7:32 AM    Recent Labs      09/22/17 0215 09/23/17 0615  09/24/17   0450   SODIUM  138  138  138   POTASSIUM  3.8  3.6  3.8   CHLORIDE  107  111  109   CO2  23  20  22   BUN  9  4*  4*   CREATININE  0.53  0.43*  0.45*   MAGNESIUM  1.6  1.9  2.1   PHOSPHORUS  2.9  1.7*  2.8   CALCIUM  7.5*  7.5*  8.0*       Recent Labs      09/21/17   1022  09/22/17   0215 09/23/17   0615  09/24/17   0450   ALTSGPT  16   --    --    --    ASTSGOT  13   --    --    --    ALKPHOSPHAT  29*   --    --    --    TBILIRUBIN  0.4   --    --    --    GLUCOSE  188*  123*  94  106*       Recent Labs      09/21/17   1022  09/21/17   1744  09/22/17   0215  09/23/17   0615  " 09/24/17   0450   RBC  2.82*  3.59*  3.23*  2.80*  2.80*   HEMOGLOBIN  8.5*  10.8*  9.8*  8.5*  8.3*   HEMATOCRIT  28.3*  31.9*  28.3*  25.7*  26.0*   PLATELETCT  154*  170  160*  135*  153*   PROTHROMBTM  18.3*  14.6   --    --    --    APTT  29.0  28.5   --    --    --    INR  1.47*  1.10   --    --    --        Recent Labs      09/21/17   1022   09/22/17   0215  09/23/17   0615  09/24/17   0450   WBC  12.4*   < >  17.0*  13.8*  11.6*   NEUTSPOLYS  76.10*   --   83.80*  75.40*  71.60   LYMPHOCYTES  15.00*   --   7.30*  10.60*  11.50*   MONOCYTES  6.50   --   8.20  10.50  10.90   EOSINOPHILS  0.90   --   0.00  2.80  5.10   BASOPHILS  0.20   --   0.10  0.30  0.30   ASTSGOT  13   --    --    --    --    ALTSGPT  16   --    --    --    --    ALKPHOSPHAT  29*   --    --    --    --    TBILIRUBIN  0.4   --    --    --    --     < > = values in this interval not displayed.           Assessment/Plan   # Acute respiratory failure given inability to protect airway - intubated 9/21-9/22 - resolved    - encourage IS/PEP    - Mobilization, RT protocol    # Hemorrhagic/hypovolemic shock status post aggressive resuscitation efforts - resolved    - Monitor serial CBC daily with conservative transfusion strategy    - Limit anticoagulants for now    # Severe, life threatening lower GIB, probable diverticular in nature s/p subtotal colectomy with ileostomy 9/21  - Surgery following, diet advancement per surgery  - Stoma care  - pain meds prn (oxycodone, morphine)     # Platelet dysfunction - secondary to aspirin and Meloxicam use - status post platelet transfusion  Leukocytosis, reactive in nature, no obvious sign of infection - improving   - Monitor off antibiotics for now    # Thrombocytopenia- consumptive in nature. Stable.  - CTM    # DM2 - Stable. Not requiring insulin  - C/w gabapentin    # Gastroesophageal reflux disease   - PPI    # Obstructive sleep apnea, on home CPAP   - daily at bedtime CPAP    # Alcohol abuse - no  history of withdrawal in the past    - Monitor for withdrawal syndrome, vitamin supplementation    # Non-oxygen dependent COPD - without active exacerbation     - prn duonebs    # Hypo-K/phos/mag - Resolved  - repletion as needed     # Hypertension - systemic arterial   Pt was hypotensive overnight (SBPs in 90s). Now SBP has improved to >120    - PO  Amlodipine and lisinopril have been d/c'ed for now  - Toprol dose decreased to 12.5 qd as tolerated, HOLD if SBP <110

## 2017-09-24 NOTE — RESPIRATORY CARE
COPD EDUCATION by COPD CLINICAL EDUCATOR  9/24/2017  at  4:18 PM by Imelda Wolf     Patient interviewed by COPD education team.  Patient unable to participate in full program.  Family at bedside involved in discussion. Short intervention has been conducted.  A comprehensive packet including information about COPD, treatments, and smoking cessation given. He was a cigar smoker in the distant past. Uses CPAP at night ([facility]ARA) with no Oxygen prior to this admission. [facility] He states he's had PFT 's >2yrs ago  with no medication needs. He has documented Pulmonary hypertension.   He began ambulating with no oxygen with fatigue, frequent stops. Lowest SpO2 on RA was 80-83. Pt placed back on Oxygen and SpO2 improved after rest/recovery to 97% (5min).  Found patient returning from  BR with walker and  -0- Oxygen displaying SOB with desaturation to 82%. Placed back on Oxygen at 2 lpm SpO2 returned to 93-94%. Extension tubing placed and pt instructed to use O@ with ambulation at this time.  RN notified

## 2017-09-24 NOTE — PROGRESS NOTES
Patient with transfer orders to Telemetry [id number].  RN spoke with BUZZ Mireles for report, all questions answered at this time. Patient is updated on POC, verbalized understanding. Patient will update family regarding transfer. Awaiting transport for . Charge RN made aware.

## 2017-09-24 NOTE — PROGRESS NOTES
Received report from BUZZ Vance ([facility]). Pt arrived to unit with belongings via gurney. Pt's tele box on, confirmed with monitor room. A&O x 4. 2 L O2 via nasal cannula. Assessment completed. Pt refusing PNA and influenza vaccines. No c/o of pain. Fall precautions in place. Bedside table and call light within reach. No grimacing noted. Will continue to monitor.

## 2017-09-24 NOTE — PROGRESS NOTES
2 RN skin check completed with [facility] RNs: Generalized bruising. Midline incision with dressing in place, old drainage, dressing intact. RUQ ostomy present. No wound surrounding ostomy. Feet dry and flaky bilaterally. Bottom pink and blanching. Otherwise, skin intact.

## 2017-09-24 NOTE — CARE PLAN
Problem: Venous Thromboembolism (VTW)/Deep Vein Thrombosis (DVT) Prevention:  Goal: Patient will participate in Venous Thrombosis (VTE)/Deep Vein Thrombosis (DVT)Prevention Measures  Outcome: PROGRESSING AS EXPECTED  Patient has SCDs in place. Patient given Lovenox SQ for VTE prophylaxis.    Problem: Bowel/Gastric:  Goal: Normal bowel function is maintained or improved  Outcome: PROGRESSING AS EXPECTED  Patient with newly placed ostomy. Patient given education regarding care. Last BM 9/23 with 600 cc of stool.

## 2017-09-24 NOTE — PROGRESS NOTES
Bina Richardson Fall Risk Assessment:     Last Known Fall: No falls  Mobility: Immobilized/requires assist of one person  Medications: Cardiovascular or central nervous system meds  Mental Status/LOC/Awareness: Awake, alert, and oriented to date, place, and person  Toileting Needs: Use of assistive device (Bedside commode, bedpan, urinal)  Volume/Electrolyte Status: Use of IV fluids/tube feeds  Communication/Sensory: Visual (Glasses)/hearing deficit  Behavior: Appropriate behavior  Bina Richardson Fall Risk Total: 11  Fall Risk Level: MODERATE RISK    Universal Fall Precautions:  call light/belongings in reach, bed in low position and locked, siderails up x 2, use non-slip footwear, adequate lighting, clutter free and spill free environment, educate on level of risk, educate to call for assistance    Fall Risk Level Interventions:    TRIAL (TELE 8, NEURO, MED SANG 5) Moderate Fall Risk Interventions  Place yellow fall risk ID band on patient: completed  Provide patient/family education based on risk assessment : completed  Educate patient/family to call staff for assistance when getting out of bed: completed  Place fall precaution signage outside patient door: completed  Utilize bed/chair fall alarm: completed     Patient Specific Interventions:     Medication: review medications with patient and family  Mental Status/LOC/Awareness: reinforce falls education, check on patient hourly, utilize bed/chair fall alarm and reinforce the use of call light  Toileting: instruct patient/family on the need to call for assistance when toileting  Volume/Electrolyte Status: ensure patient remains hydrated, monitor abnormal lab values and ensure IV fluids are appropriate  Communication/Sensory: update plan of care on whiteboard  Behavioral: administer medication as ordered and instruct/reinforce fall program rationale  Mobility: utilize bed/chair fall alarm and ensure bed is locked and in lowest position

## 2017-09-24 NOTE — PROGRESS NOTES
Patient off unit at 1955. Patient taken to T804-02 with RN and CCT.  Patient updated on POC. Patient personal belongings, chart, and meds given to receiving RN. Charge RN updated.

## 2017-09-24 NOTE — PROGRESS NOTES
"Surgical Progress Note:      No acute events  Transferred to floor  Still with bowel function  Some pain, overall controlled      PE:  /71   Pulse 82   Temp 36.8 °C (98.2 °F)   Resp 18   Ht 1.753 m (5' 9\")   Wt 107 kg (236 lb)   SpO2 90%   BMI 34.85 kg/m²     I/O:   Intake/Output Summary (Last 24 hours) at 09/24/17 1056  Last data filed at 09/24/17 0744   Gross per 24 hour   Intake            70344 ml   Output             4525 ml   Net            34853 ml     UOP:  PO:  IV:    GEN: NAD  COR: RRR  PULM: CTA  ABD: soft, NTND, incision intact, ostomy pink/productive      Labs:  Recent Labs      09/22/17   0215  09/23/17   0615  09/24/17   0450   WBC  17.0*  13.8*  11.6*   RBC  3.23*  2.80*  2.80*   HEMOGLOBIN  9.8*  8.5*  8.3*   HEMATOCRIT  28.3*  25.7*  26.0*   MCV  87.6  91.8  92.9   MCH  30.3  30.4  29.6   RDW  53.7*  59.1*  58.2*   PLATELETCT  160*  135*  153*   MPV  9.4  9.2  9.2   NEUTSPOLYS  83.80*  75.40*  71.60   LYMPHOCYTES  7.30*  10.60*  11.50*   MONOCYTES  8.20  10.50  10.90   EOSINOPHILS  0.00  2.80  5.10   BASOPHILS  0.10  0.30  0.30     Recent Labs      09/22/17   0215  09/23/17   0615  09/24/17   0450   SODIUM  138  138  138   POTASSIUM  3.8  3.6  3.8   CHLORIDE  107  111  109   CO2  23  20  22   GLUCOSE  123*  94  106*   BUN  9  4*  4*         A/P:   S/P colectomy/ileostomy for GI bleed  Overall doing well  Advance diet  ambulate     Fan Borjas M.D.  [facility]  [phone]    "

## 2017-09-24 NOTE — CARE PLAN
Problem: Respiratory:  Goal: Respiratory status will improve    Intervention: Educate and encourage coughing and deep breathing  Pt educated about coughing and deep breathing, and is also aware how to self-suction.

## 2017-09-24 NOTE — CARE PLAN
Problem: Pain Management  Goal: Pain level will decrease to patient's comfort goal  No c/o pain this shift. No grimacing noted. Calm and cooperative. Hourly rounding in place, will continue to monitor.

## 2017-09-24 NOTE — WOUND TEAM
"[facility]  Inpatient Services  New Ostomy Management & Teaching    HPI:  Reviewed  PMH: Reviewed   SH: Reviewed    Subjective:  \"I would like my wife to be here.\"     Objective:  Previous appliance intact.      Ostomy type:  Ileostomy    Stoma location: RUQ      Stoma assessment:      Appearance:    Pink, mildly edematous     Size:              ~1.5\"          Protrusion:  ~1\"    Output:  Moderate brown watery     MC jxn   Intact     Peristomal skin: Drained bullae to right area      Ostomy Appliance (type and size):  2 piece 2 1/4 with paste ring       Interventions:  With patient and patients wife, SS completed.  All steps verbalized as patient and wife engaged and asked questions.  Previous appliance removed, matt-stomal skin cleansed with warm wash cloth.  No sting skin prep applied over drained bullae and covered with cut piece of hydrocolloid thin.  Barrier cut to 1 1/2 oval.  Paste ring applied to barrier and applied to skin.  Pouch attached and closed.  Patient practiced closing pouch.             Pt education: Questions and concerns addressed    Evaluation:  Patient post op emergent Subtotal colectomy with ileostomy after lower GI bleed, hemorrhagic shock, diverticulosis.  Patient now on regular diet, tolerated well.  Alert and engaging.  Do not anticipate pouching issues.  Wife also involved.           Plan: Ostomy nurses to continue to follow for ostomy needs and teaching     Anticipated discharge needs: Supplies, supplier information, possible HH or outpatient ostomy clinic   "

## 2017-09-25 ENCOUNTER — PATIENT OUTREACH (OUTPATIENT)
Dept: HEALTH INFORMATION MANAGEMENT | Facility: OTHER | Age: 82
End: 2017-09-25

## 2017-09-25 VITALS
RESPIRATION RATE: 18 BRPM | DIASTOLIC BLOOD PRESSURE: 75 MMHG | SYSTOLIC BLOOD PRESSURE: 132 MMHG | HEIGHT: 69 IN | OXYGEN SATURATION: 96 % | WEIGHT: 234.57 LBS | HEART RATE: 89 BPM | BODY MASS INDEX: 34.74 KG/M2 | TEMPERATURE: 98.5 F

## 2017-09-25 LAB
ALBUMIN SERPL BCP-MCNC: 2.8 G/DL (ref 3.2–4.9)
ALBUMIN/GLOB SERPL: 1 G/DL
ALP SERPL-CCNC: 72 U/L (ref 30–99)
ALT SERPL-CCNC: 47 U/L (ref 2–50)
ANION GAP SERPL CALC-SCNC: 7 MMOL/L (ref 0–11.9)
AST SERPL-CCNC: 51 U/L (ref 12–45)
BASE EXCESS BLDA CALC-SCNC: -10 MMOL/L (ref -4–3)
BASE EXCESS BLDA CALC-SCNC: -2 MMOL/L (ref -4–3)
BASOPHILS # BLD AUTO: 0.4 % (ref 0–1.8)
BASOPHILS # BLD: 0.03 K/UL (ref 0–0.12)
BILIRUB SERPL-MCNC: 0.6 MG/DL (ref 0.1–1.5)
BODY TEMPERATURE: ABNORMAL DEGREES
BODY TEMPERATURE: ABNORMAL DEGREES
BUN SERPL-MCNC: 4 MG/DL (ref 8–22)
CA-I BLD ISE-SCNC: 0.82 MMOL/L (ref 1.1–1.3)
CA-I BLD ISE-SCNC: 0.98 MMOL/L (ref 1.1–1.3)
CALCIUM SERPL-MCNC: 8.1 MG/DL (ref 8.5–10.5)
CHLORIDE SERPL-SCNC: 109 MMOL/L (ref 96–112)
CO2 BLDA-SCNC: 20 MMOL/L (ref 20–33)
CO2 BLDA-SCNC: 24 MMOL/L (ref 20–33)
CO2 SERPL-SCNC: 22 MMOL/L (ref 20–33)
CREAT SERPL-MCNC: 0.42 MG/DL (ref 0.5–1.4)
EOSINOPHIL # BLD AUTO: 0.52 K/UL (ref 0–0.51)
EOSINOPHIL NFR BLD: 6.3 % (ref 0–6.9)
ERYTHROCYTE [DISTWIDTH] IN BLOOD BY AUTOMATED COUNT: 55.4 FL (ref 35.9–50)
GFR SERPL CREATININE-BSD FRML MDRD: >60 ML/MIN/1.73 M 2
GLOBULIN SER CALC-MCNC: 2.8 G/DL (ref 1.9–3.5)
GLUCOSE BLD-MCNC: 140 MG/DL (ref 65–99)
GLUCOSE BLD-MCNC: 181 MG/DL (ref 65–99)
GLUCOSE BLD-MCNC: 203 MG/DL (ref 65–99)
GLUCOSE SERPL-MCNC: 115 MG/DL (ref 65–99)
HCO3 BLDA-SCNC: 18.3 MMOL/L (ref 17–25)
HCO3 BLDA-SCNC: 22.7 MMOL/L (ref 17–25)
HCT VFR BLD AUTO: 25.3 % (ref 42–52)
HCT VFR BLD CALC: 27 % (ref 42–52)
HCT VFR BLD CALC: 32 % (ref 42–52)
HGB BLD-MCNC: 10.9 G/DL (ref 14–18)
HGB BLD-MCNC: 8.4 G/DL (ref 14–18)
HGB BLD-MCNC: 9.2 G/DL (ref 14–18)
IMM GRANULOCYTES # BLD AUTO: 0.05 K/UL (ref 0–0.11)
IMM GRANULOCYTES NFR BLD AUTO: 0.6 % (ref 0–0.9)
LYMPHOCYTES # BLD AUTO: 1.22 K/UL (ref 1–4.8)
LYMPHOCYTES NFR BLD: 14.7 % (ref 22–41)
MCH RBC QN AUTO: 30.8 PG (ref 27–33)
MCHC RBC AUTO-ENTMCNC: 33.2 G/DL (ref 33.7–35.3)
MCV RBC AUTO: 92.7 FL (ref 81.4–97.8)
MONOCYTES # BLD AUTO: 1.22 K/UL (ref 0–0.85)
MONOCYTES NFR BLD AUTO: 14.7 % (ref 0–13.4)
NEUTROPHILS # BLD AUTO: 5.28 K/UL (ref 1.82–7.42)
NEUTROPHILS NFR BLD: 63.3 % (ref 44–72)
NRBC # BLD AUTO: 0 K/UL
NRBC BLD AUTO-RTO: 0 /100 WBC
PCO2 BLDA: 37.3 MMHG (ref 26–37)
PCO2 BLDA: 49.1 MMHG (ref 26–37)
PH BLDA: 7.18 [PH] (ref 7.4–7.5)
PH BLDA: 7.39 [PH] (ref 7.4–7.5)
PLATELET # BLD AUTO: 181 K/UL (ref 164–446)
PMV BLD AUTO: 9.2 FL (ref 9–12.9)
PO2 BLDA: 155 MMHG (ref 64–87)
PO2 BLDA: 181 MMHG (ref 64–87)
POTASSIUM BLD-SCNC: 5.3 MMOL/L (ref 3.6–5.5)
POTASSIUM BLD-SCNC: 5.6 MMOL/L (ref 3.6–5.5)
POTASSIUM SERPL-SCNC: 3.7 MMOL/L (ref 3.6–5.5)
PROT SERPL-MCNC: 5.6 G/DL (ref 6–8.2)
RBC # BLD AUTO: 2.73 M/UL (ref 4.7–6.1)
SAO2 % BLDA: 99 % (ref 93–99)
SAO2 % BLDA: 99 % (ref 93–99)
SODIUM BLD-SCNC: 135 MMOL/L (ref 135–145)
SODIUM BLD-SCNC: 139 MMOL/L (ref 135–145)
SODIUM SERPL-SCNC: 138 MMOL/L (ref 135–145)
SPECIMEN DRAWN FROM PATIENT: ABNORMAL
SPECIMEN DRAWN FROM PATIENT: ABNORMAL
WBC # BLD AUTO: 8.3 K/UL (ref 4.8–10.8)

## 2017-09-25 PROCEDURE — 85025 COMPLETE CBC W/AUTO DIFF WBC: CPT

## 2017-09-25 PROCEDURE — 82962 GLUCOSE BLOOD TEST: CPT

## 2017-09-25 PROCEDURE — 99239 HOSP IP/OBS DSCHRG MGMT >30: CPT | Performed by: HOSPITALIST

## 2017-09-25 PROCEDURE — 700102 HCHG RX REV CODE 250 W/ 637 OVERRIDE(OP): Performed by: STUDENT IN AN ORGANIZED HEALTH CARE EDUCATION/TRAINING PROGRAM

## 2017-09-25 PROCEDURE — 80053 COMPREHEN METABOLIC PANEL: CPT

## 2017-09-25 PROCEDURE — A9270 NON-COVERED ITEM OR SERVICE: HCPCS | Performed by: STUDENT IN AN ORGANIZED HEALTH CARE EDUCATION/TRAINING PROGRAM

## 2017-09-25 PROCEDURE — 94660 CPAP INITIATION&MGMT: CPT

## 2017-09-25 PROCEDURE — 700105 HCHG RX REV CODE 258: Performed by: STUDENT IN AN ORGANIZED HEALTH CARE EDUCATION/TRAINING PROGRAM

## 2017-09-25 PROCEDURE — 700111 HCHG RX REV CODE 636 W/ 250 OVERRIDE (IP): Performed by: STUDENT IN AN ORGANIZED HEALTH CARE EDUCATION/TRAINING PROGRAM

## 2017-09-25 PROCEDURE — 36415 COLL VENOUS BLD VENIPUNCTURE: CPT

## 2017-09-25 RX ADMIN — SODIUM CHLORIDE: 9 INJECTION, SOLUTION INTRAVENOUS at 04:24

## 2017-09-25 RX ADMIN — FAMOTIDINE 20 MG: 20 TABLET, FILM COATED ORAL at 08:05

## 2017-09-25 RX ADMIN — GABAPENTIN 300 MG: 300 CAPSULE ORAL at 15:37

## 2017-09-25 RX ADMIN — GABAPENTIN 300 MG: 300 CAPSULE ORAL at 08:05

## 2017-09-25 RX ADMIN — ENOXAPARIN SODIUM 40 MG: 100 INJECTION SUBCUTANEOUS at 08:05

## 2017-09-25 RX ADMIN — TAMSULOSIN HYDROCHLORIDE 0.4 MG: 0.4 CAPSULE ORAL at 08:05

## 2017-09-25 ASSESSMENT — PAIN SCALES - GENERAL: PAINLEVEL_OUTOF10: 0

## 2017-09-25 ASSESSMENT — LIFESTYLE VARIABLES: EVER_SMOKED: NEVER

## 2017-09-25 NOTE — DISCHARGE SUMMARY
Internal Medicine Discharge Summary      Admit Date:  9/21/2017       Discharge Date:   9/25/2017    Service:   [facility] Internal Medicine Green Team  Attending Physician(s):   Dr. Garrison     Senior Resident(s):   Dr. Champagne  Dimitrios Resident(s):   Dr. Villalba      Primary Diagnosis:   GI Bleed    Secondary Diagnoses:                Active Problems:    S/P total colectomy POA: Unknown    S/P ileostomy (CMS-HCC) POA: Unknown    Hypokalemia POA: Unknown    Hypomagnesemia POA: Unknown    Acute hypoxemic respiratory failure (CMS-HCC) POA: Unknown    Hospital Summary (Brief Narrative):       82 year-old [facility] with 3 episodes of BRBPR at home. Hgb on presentation was 12. He had another large witnessed [facility], after which he became hemodynamically unstable with BP of 80/50 despite 3 units of packed red cell transfusion. He was intubated for airway protection and was transferred to ER at [facility] for higher-level care. In ER at [facility], he had a BP of 85/56, Pulse of 109, RR 16 and O2 sat was 93%. Afebrile. He had a Hgb of 8.5. CTA abdomen did not reveal a specific active bleeding point. Pt was seen by Surgery (Dr. Wagner), and he was transferred immediately to OR and underwent an emergency sub-total colectomy and end ileostomy then taken to the ICU. H/H was monitored. IV protonix started.     Inotropic support (levophed) was weaned on 9/22, tolerated well. Pt was extubated and inotropic support was stopped on 9/23. Pt was transferred to Medicine on 9/24. Ileostomy was functioning well. Tolerated clear liquids. Patient's BP were low, and PO blood pressure meds were held. It was noted that the patient required O2 on ambulation. On 9/25 Home Oxygen referral was placed. Pt and wife received ostomy teaching.     Pt is medically clear for discharge home with home health and home oxygen.     Consultants:     Surgery (Dr. Wagner)  Wound Care    Procedures:        9/21 PROCEDURE  PERFORMED -Emergent Subtotal colectomy with ileostomy, mobilization of splenic flexure    Imaging/ Testing:      DX-CHEST-PORTABLE (1 VIEW)   Final Result      Stable enlargement of the cardiomediastinal silhouette, mild diffuse interstitial edema and bilateral lower lobe atelectasis and/or consolidation.      DX-CHEST-PORTABLE (1 VIEW)   Final Result      Stable enlargement of the cardiomediastinal silhouette, mild diffuse interstitial edema and bilateral lower lobe atelectasis and/or consolidation.      DX-CHEST-LIMITED (1 VIEW)   Final Result      Increased perihilar and bibasilar opacities may represent edema, atelectasis, pneumonia.      Pleural calcification can be seen as a sequelae of prior asbestos exposure.      Right central line projects over the SVC. No pneumothorax.      DX-CHEST-PORTABLE (1 VIEW)   Final Result      1.  Interval placement of multiple support devices.      2.  Bibasilar atelectasis.      3.  Cardiomegaly.      CTA ABDOMEN PELVIS W & W/O POST PROCESS   Final Result      No active contrast extravasation is identified to suggest an active GI bleed at this time. However, evaluation of the stomach and small bowel is particularly limited by the presence of enteric contrast.      Mild narrowing of the proximal celiac trunk.      Colonic diverticulosis. Colon is fluid-filled which can be seen in the setting of diarrhea.      Atherosclerotic plaque.      Bibasilar opacities may represent atelectasis or consolidation.      Pleural calcification can be seen in the setting of prior asbestos exposure.                Discharge Medications:         Medication Reconciliation: Completed       Medication List      CONTINUE taking these medications      Instructions   atorvastatin 20 MG Tabs  Commonly known as:  LIPITOR   Take 20 mg by mouth every evening.  Dose:  20 mg     capsaicin 0.025 % cream  Commonly known as:  ZOSTRIX   Apply 1 Application to affected area(s) 4 times a day. knees  Dose:  1  Application     gabapentin 100 MG Caps  Commonly known as:  NEURONTIN   Take 300 mg by mouth 3 times a day.  Dose:  300 mg     meloxicam 7.5 MG Tabs  Commonly known as:  MOBIC   Take 15 mg by mouth every day.  Dose:  15 mg     metformin 500 MG Tabs  Commonly known as:  GLUCOPHAGE   Take 500 mg by mouth 2 times a day, with meals.  Dose:  500 mg     ranitidine 150 MG Tabs  Commonly known as:  ZANTAC   Take 150 mg by mouth 2 times a day.  Dose:  150 mg     tamsulosin 0.4 MG capsule  Commonly known as:  FLOMAX   Take 0.4 mg by mouth ONE-HALF HOUR AFTER BREAKFAST.  Dose:  0.4 mg        STOP taking these medications    amlodipine 5 MG Tabs  Commonly known as:  NORVASC     lisinopril 5 MG Tabs  Commonly known as:  PRINIVIL     metoprolol 25 MG Tabs  Commonly known as:  LOPRESSOR            Can use .DISCHARGEMEDSLIST if going to another facility         Disposition:   Home with home healh    Diet:   As tolerated    Activity:   As tolerated    Instructions:        The patient was instructed to return to the ER in the event of worsening symptoms. I have counseled the patient on the importance of compliance and the patient has agreed to proceed with all medical recommendations and follow up plan indicated above.   The patient understands that all medications come with benefits and risks. Risks may include permanent injury or death and these risks can be minimized with close reassessment and monitoring.        Primary Care Provider:    Corey Jamison M.D.    Follow up appointment details :      Please follow up with Dr. Wagner (surgery) in 1-2 weeks  Please follow up with PMD    Pending Studies:        60    Time spent on discharge day patient visit, preparing discharge paperwork and arranging for patient follow up.    Summary of follow up issues:   None    Discharge Time (Minutes) :    60        Condition on Discharge    ______________________________________________________________________    Interval history/exam for  day of discharge:    Stable    Vitals:    09/24/17 2309 09/25/17 0316 09/25/17 0818 09/25/17 1207   BP: 117/51 113/62 104/71 128/76   Pulse: 89 77 77 89   Resp: 16 16 19 19   Temp: 37.4 °C (99.3 °F) 36.9 °C (98.4 °F) 37.2 °C (98.9 °F) 37.2 °C (99 °F)   SpO2: 94% 93% 97% 97%   Weight:       Height:         Weight/BMI: Body mass index is 34.64 kg/m².  Pulse Oximetry: 97 %, O2 (LPM): 2, O2 Delivery: Silicone Nasal Cannula    General: NAD  CVS: RRR  PULM: CTAB  ABDOMEN: Midline scar without erythema. Ileostomy bag functioning well.     Most Recent Labs:    Lab Results   Component Value Date/Time    WBC 8.3 09/25/2017 02:36 AM    RBC 2.73 (L) 09/25/2017 02:36 AM    HEMOGLOBIN 8.4 (L) 09/25/2017 02:36 AM    HEMATOCRIT 25.3 (L) 09/25/2017 02:36 AM    MCV 92.7 09/25/2017 02:36 AM    MCH 30.8 09/25/2017 02:36 AM    MCHC 33.2 (L) 09/25/2017 02:36 AM    MPV 9.2 09/25/2017 02:36 AM    NEUTSPOLYS 63.30 09/25/2017 02:36 AM    LYMPHOCYTES 14.70 (L) 09/25/2017 02:36 AM    MONOCYTES 14.70 (H) 09/25/2017 02:36 AM    EOSINOPHILS 6.30 09/25/2017 02:36 AM    BASOPHILS 0.40 09/25/2017 02:36 AM      Lab Results   Component Value Date/Time    SODIUM 138 09/25/2017 02:36 AM    POTASSIUM 3.7 09/25/2017 02:36 AM    CHLORIDE 109 09/25/2017 02:36 AM    CO2 22 09/25/2017 02:36 AM    GLUCOSE 115 (H) 09/25/2017 02:36 AM    BUN 4 (L) 09/25/2017 02:36 AM    CREATININE 0.42 (L) 09/25/2017 02:36 AM    CREATININE 0.9 07/28/2008 01:10 PM      Lab Results   Component Value Date/Time    ALTSGPT 47 09/25/2017 02:36 AM    ASTSGOT 51 (H) 09/25/2017 02:36 AM    ALKPHOSPHAT 72 09/25/2017 02:36 AM    TBILIRUBIN 0.6 09/25/2017 02:36 AM    ALBUMIN 2.8 (L) 09/25/2017 02:36 AM    GLOBULIN 2.8 09/25/2017 02:36 AM    INR 1.10 09/21/2017 05:44 PM     Lab Results   Component Value Date/Time    PROTHROMBTM 14.6 09/21/2017 05:44 PM    INR 1.10 09/21/2017 05:44 PM

## 2017-09-25 NOTE — DISCHARGE PLANNING
KELIN received oxygen order - [facility], however it needs the MD's signature. Paged [facility] MD, awaiting return call. KELIN also notified bedside RN of the need for signature. [facility] once completed.

## 2017-09-25 NOTE — DISCHARGE PLANNING
KELIN spoke to Amanda at the [facility]. Confirmed she received the order and is sending it to the provider. Amanda is aware that pt is ready for DC pending his oxygen delivery. Provided evening KELIN's phone number to Amanda if follow up is needed.

## 2017-09-25 NOTE — PROGRESS NOTES
Received report from NOC RN, assumed pt care. A&O x 4. No c/o pain, no grimacing noted. Assessment completed. Fall precautions in place. Call light and bedside table within reach. POC: Supplemental O2 therapy, Lovenox VTE prophylaxis, IV maintenance fluids (NS), stoma care, CPAP at night. Will continue to monitor.

## 2017-09-25 NOTE — CARE PLAN
Problem: Communication  Goal: The ability to communicate needs accurately and effectively will improve    Intervention: [place] patient and significant other/support system to call light to alert staff of needs  Pt educated to use call light prior to getting out of bed. Pt verbalized understanding.

## 2017-09-25 NOTE — PROGRESS NOTES
"Surgical Progress Note:      No acute events  Tolerating regular diet  ambulating    PE:  /71   Pulse 77   Temp 37.2 °C (98.9 °F)   Resp 19   Ht 1.753 m (5' 9\")   Wt 106.4 kg (234 lb 9.1 oz)   SpO2 97%   BMI 34.64 kg/m²     I/O:   Intake/Output Summary (Last 24 hours) at 09/25/17 1019  Last data filed at 09/25/17 0800   Gross per 24 hour   Intake              240 ml   Output             1750 ml   Net            -1510 ml     UOP:  PO:  IV:    GEN: NAD  COR: RRR  PULM: CTA  ABD: soft, NTND, incision intact, ostomy productive and pink      Labs:  Recent Labs      09/23/17   0615  09/24/17   0450  09/25/17   0236   WBC  13.8*  11.6*  8.3   RBC  2.80*  2.80*  2.73*   HEMOGLOBIN  8.5*  8.3*  8.4*   HEMATOCRIT  25.7*  26.0*  25.3*   MCV  91.8  92.9  92.7   MCH  30.4  29.6  30.8   RDW  59.1*  58.2*  55.4*   PLATELETCT  135*  153*  181   MPV  9.2  9.2  9.2   NEUTSPOLYS  75.40*  71.60  63.30   LYMPHOCYTES  10.60*  11.50*  14.70*   MONOCYTES  10.50  10.90  14.70*   EOSINOPHILS  2.80  5.10  6.30   BASOPHILS  0.30  0.30  0.40     Recent Labs      09/23/17   0615  09/24/17   0450  09/25/17   0236   SODIUM  138  138  138   POTASSIUM  3.6  3.8  3.7   CHLORIDE  111  109  109   CO2  20  22  22   GLUCOSE  94  106*  115*   BUN  4*  4*  4*         A/P:   S/P total colectomy  Doing well from surgical standpoint  Ok to discharge home once ostomy teaching complete and cleared medically  Follow up with Dr. Wagner in 1-2 weeks     Fan Borjas M.D.  [facility]  [phone]    "

## 2017-09-25 NOTE — FACE TO FACE
Face to Face Supporting Documentation - Home Health    The encounter with this patient was in whole or in part the primary reason for home health admission.    Date of encounter:   Patient:                    MRN:                       YOB: 2016  Jamil Kendall  [id number]  1935     Home health to see patient for:  Wound Care    Skilled need for:  Surgical Aftercare Wound care s/p colectomy    Skilled nursing interventions to include:  Wound Care    Homebound status evidenced by:  Need the aid of supportive devices such as crutches, canes, wheelchairs or walkers. Leaving home requires a considerable and taxing effort. There is a normal inability to leave the home.    Community Physician to provide follow up care: Corey Jamison M.D.     Optional Interventions? No      I certify the face to face encounter for this home health care referral meets the CMS requirements and the encounter/clinical assessment with the patient was, in whole, or in part, for the medical condition(s) listed above, which is the primary reason for home health care. Based on my clinical findings: the service(s) are medically necessary, support the need for home health care, and the homebound criteria are met.  I certify that this patient has had a face to face encounter by myself.  No Villalba M.D. - NPI: [id number]

## 2017-09-25 NOTE — CARE PLAN
Problem: Oxygenation:  Goal: Maintain adequate oxygenation dependent on patient condition    Intervention: Levels of oxygenation will improve to baseline  97% on 2LPM nasal cannula; will continue to titrate as tolerated.       Problem: Hyperinflation:  Goal: Prevent or improve atelectasis    Intervention: Instruct incentive spirometry usage  60% of predicted is 1650; best IS value today was 1500  Intervention: Perform hyperinflation therapy as indicated by assessment  PEP QID

## 2017-09-25 NOTE — CARE PLAN
Problem: Safety  Goal: Will remain free from falls  Fall precautions in place. Call light and bedside table within reach. Hourly rounding in place.

## 2017-09-25 NOTE — PROGRESS NOTES
Spoke with BUZZ Yeager. Pt was found to have O2 sat of 83% on RA with ambulation, 94% on RA without ambulation.     DME referral for home O2 placed.

## 2017-09-25 NOTE — PROGRESS NOTES
Bina Richardson Fall Risk Assessment:     Last Known Fall: Within the last year  Mobility: Use of assistive device/requires assist of two people  Medications: Cardiovascular or central nervous system meds  Mental Status/LOC/Awareness: Awake, alert, and oriented to date, place, and person  Toileting Needs: Use of assistive device (Bedside commode, bedpan, urinal)  Volume/Electrolyte Status: Use of IV fluids/tube feeds  Communication/Sensory: Visual (Glasses)/hearing deficit  Behavior: Appropriate behavior  Bina Richardson Fall Risk Total: 13  Fall Risk Level: MODERATE RISK    Universal Fall Precautions:  call light/belongings in reach, bed in low position and locked, use non-slip footwear, siderails up x 2, adequate lighting, clutter free and spill free environment, educate on level of risk, educate to call for assistance    Fall Risk Level Interventions:    TRIAL (TELE 8, NEURO, MED SANG 5) Moderate Fall Risk Interventions  Place yellow fall risk ID band on patient: verified  Provide patient/family education based on risk assessment : completed  Educate patient/family to call staff for assistance when getting out of bed: completed  Place fall precaution signage outside patient door: verified  Utilize bed/chair fall alarm: verified     Patient Specific Interventions:     Medication: review medications with patient and family and assess for medications that can be discontinued or dosage decreased  Mental Status/LOC/Awareness: reinforce falls education, check on patient hourly, utilize bed/chair fall alarm and reinforce the use of call light  Toileting: instruct patient/family on the need to call for assistance when toileting  Volume/Electrolyte Status: ensure patient remains hydrated, monitor abnormal lab values and ensure IV fluids are appropriate  Communication/Sensory: update plan of care on whiteboard and ensure proper positioning when transferrng/ambulating  Behavioral: administer medication as ordered and  instruct/reinforce fall program rationale  Mobility: schedule physical activity throughout the day, utilize bed/chair fall alarm and ensure bed is locked and in lowest position

## 2017-09-25 NOTE — DISCHARGE PLANNING
KELIN spoke to pt at bedside. He requested we send a referral to [facility]. KELIN completed choice form and faxed to RULA Mejía.

## 2017-09-26 NOTE — DISCHARGE INSTRUCTIONS
Discharge Instructions    Discharged to home by car with relative. Discharged via wheelchair, hospital escort: Yes.  Special equipment needed: Not Applicable    Be sure to schedule a follow-up appointment with your primary care doctor or any specialists as instructed.     Discharge Plan:   Diet Plan: Discussed  Activity Level: Discussed  Confirmed Follow up Appointment: Appointment Scheduled  Confirmed Symptoms Management: Discussed  Medication Reconciliation Updated: Yes  Pneumococcal Vaccine Given - only chart on this line when given:  (pt refuses)  Influenza Vaccine Indication: Not indicated: Previously immunized this influenza season and > 8 years of age    I understand that a diet low in cholesterol, fat, and sodium is recommended for good health. Unless I have been given specific instructions below for another diet, I accept this instruction as my diet prescription.     · Is patient discharged on Warfarin / Coumadin?   No     · Is patient Post Blood Transfusion?  Yes  POST BLOOD TRANSFUSION INFORMATION (ADULT)    The purpose of blood transfusion is to correct anemia, low levels of blood clotting factors or to correct acute blood loss.   Blood transfusion is very safe but occasionally unexpected adverse reactions do occur. Most adverse reactions occur during transfusion, within one to two days following transfusion or one to two weeks following transfusion. Some adverse reactions can occur one to six months after transfusion and some even years later.             If any of the symptoms listed below happen to you during your transfusion,                                 please notify your nurse immediately.   · Fever or Chills  · Flushing of the Face  · Hives, rash or itching  · Difficulty in breathing or shortness of breath  · Pain or oozing of blood from the IV needle site  · Low back pain  · Nausea or vomiting  · Weakness or fainting  · Chest pain  · Blood in the urine  · Decreased frequency of  urination    The above symptoms may also occur within 24 to 48 after transfusion; if so, notify your physician.     · Yellowing of the skin    This can happen one to six months after transfusion; if so, notify your physician    Depression / Suicide Risk    As you are discharged from this [facility] facility, it is important to learn how to keep safe from harming yourself.    Recognize the warning signs:  · Abrupt changes in personality, positive or negative- including increase in energy   · Giving away possessions  · Change in eating patterns- significant weight changes-  positive or negative  · Change in sleeping patterns- unable to sleep or sleeping all the time   · Unwillingness or inability to communicate  · Depression  · Unusual sadness, discouragement and loneliness  · Talk of wanting to die  · Neglect of personal appearance   · Rebelliousness- reckless behavior  · Withdrawal from people/activities they love  · Confusion- inability to concentrate     If you or a loved one observes any of these behaviors or has concerns about self-harm, here's what you can do:  · Talk about it- your feelings and reasons for harming yourself  · Remove any means that you might use to hurt yourself (examples: pills, rope, extension cords, firearm)  · Get professional help from the community (Mental Health, Substance Abuse, psychological counseling)  · Do not be alone:Call your Safe Contact- someone whom you trust who will be there for you.  · Call your local CRISIS HOTLINE 602-3768 or 554-084-9155  · Call your local Children's Mobile Crisis Response Team [place] (513) 132-8868 or www.SidelineSwap  · Call the toll free National Suicide Prevention Hotlines   · National Suicide Prevention Lifeline 402-978-VYZU (9746)  · National Hope Line Network 800-SUICIDE (806-5804)    Discharge Instructions  Wound Care  Taking care of your wound properly can help to prevent pain and infection. It can also help your wound to heal more  quickly.   HOW TO CARE FOR YOUR WOUND   · Take or apply over-the-counter and prescription medicines only as told by your health care provider.  · If you were prescribed antibiotic medicine, take or apply it as told by your health care provider. Do not stop using the antibiotic even if your condition improves.  · Clean the wound each day or as told by your health care provider.  ¨ Wash the wound with mild soap and water.  ¨ Rinse the wound with water to remove all soap.  ¨ Pat the wound dry with a clean towel. Do not rub it.  · There are many different ways to close and cover a wound. For example, a wound can be covered with stitches (sutures), skin glue, or adhesive strips. Follow instructions from your health care provider about:  ¨ How to take care of your wound.  ¨ When and how you should change your bandage (dressing).  ¨ When you should remove your dressing.  ¨ Removing whatever was used to close your wound.  · Check your wound every day for signs of infection. Watch for:  ¨ Redness, swelling, or pain.  ¨ Fluid, blood, or pus.  · Keep the dressing dry until your health care provider says it can be removed. Do not take baths, swim, use a hot tub, or do anything that would put your wound underwater until your health care provider approves.  · Raise (elevate) the injured area above the level of your heart while you are sitting or lying down.  · Do not scratch or pick at the wound.  · Keep all follow-up visits as told by your health care provider. This is important.  SEEK MEDICAL CARE IF:  · You received a tetanus shot and you have swelling, severe pain, redness, or bleeding at the injection site.  · You have a fever.  · Your pain is not controlled with medicine.  · You have increased redness, swelling, or pain at the site of your wound.  · You have fluid, blood, or pus coming from your wound.  · You notice a bad smell coming from your wound or your dressing.  SEEK IMMEDIATE MEDICAL CARE IF:  · You have a red  streak going away from your wound.     This information is not intended to replace advice given to you by your health care provider. Make sure you discuss any questions you have with your health care provider.     Document Released: 09/26/2009 Document Revised: 05/03/2016 Document Reviewed: 12/14/2015  Elsevier Interactive Patient Education ©2016 [employer]

## 2017-09-26 NOTE — WOUND TEAM
"[facility]  Inpatient Services  New Ostomy Management & Teaching    HPI:  Reviewed  PMH: Reviewed   SH: Reviewed    Subjective:  \"I'm going home today.\"     Objective:  appliance intact      Ostomy type:  Ileostomy    Stoma location: RUQ      Stoma assessment:      Appearance:    red, moist, oval slight edema    Size:              1.5\"          Protrusion:  <1\"    Output:  Small green/brown,  watery     MC jxn   Intact     Peristomal skin: open bullae to lateral side      Ostomy Appliance (type and size):  2 piece 2 1/4 with paste ring  Hydro colloid pieces over blisters    Interventions: Pt got up and used bathroom. Back to bed and sitting on sob.  Pt removed appliance, matt-stomal skin cleaned with warm wash cloths.  Cleaned wounds then applied o Sting to open skin, covered wounds with pieces of hydrocolloid.  Pt cut barrier to 1.5\" [place]. Paste ring applied to peristomal skin and covered with barrier.  Pt attached pouch and closed end.  Reviewed handouts in purple folder. Catalog marked and extra supplies provided for DC.     Pt education: Questions and concerns addressed    Evaluation:  pt has output and flatus from stoma, reported [facility]  Pt able to perform steps of change with direction. Will need more education.     Plan: Pt to DC home    Anticipated [facility] needs[facility]  "

## 2017-10-10 NOTE — ADDENDUM NOTE
Encounter addended by: Lilian Fleming, RRT on: 10/10/2017  7:09 AM<BR>    Actions taken: Flowsheet accepted

## 2017-10-15 NOTE — ADDENDUM NOTE
Encounter addended by: Etta Boyd M.D. on: 10/15/2017  1:53 PM<BR>    Actions taken: Delete clinical note

## 2020-06-30 NOTE — PROGRESS NOTES
"  REFERRING PHYSICIAN: Junior Brooks.     CONSULTING PHYSICIAN: Marvin Salcedo DO.    CHIEF COMPLAINT: fatigue, TAVR eval    HISTORY OF PRESENT ILLNESS: The patient is a 85 y.o. male with history significant for HTN, Emphysema, hyperlipidemia, sleep apnea (on CPAP), bilateral cataracts, cancer and severe aortic stenosis.  States he was diagnosed with a murmur 4 to 5 years ago.  Over the last several months he is noticed a significant increase in his fatigue and decrease in activity level.  Denies a history of chest pain, angina, previous cardiac intervention.  Denies significant family history of cardiac disease.  Denies any history of dysrhythmias.  He stopped his 81 mg aspirin after his recent catheterization.  Denies dizziness, syncope, fevers, chills, cough, neurologic changes  PAST MEDICAL HISTORY:   Past Medical History:   Diagnosis Date   • Arthritis     back   • Cancer (HCC)    • Cataract     bilat IOL    • Cold 12/27/2016   • COPD (chronic obstructive pulmonary disease) (HCC)    • Coughing blood    • Emphysema of lung (HCC)    • Heart murmur    • Heart valve disease     \"leaky heart valve\"    • Hiatus hernia syndrome    • High cholesterol    • Hypertension    • Pain     back, right knees   • Pneumonia 2014   • Skin cancer 1990   • Sleep apnea     CPAP   • Snoring        PAST SURGICAL HISTORY:   Past Surgical History:   Procedure Laterality Date   • EXPLORATORY LAPAROTOMY  9/21/2017    Procedure: Emergent Subtotal colectomy with ileostomy, mobilization of splenic flexure;  Surgeon: Jamil Wagner M.D.;  Location: [facility];  Service: General   • LUMBAR LAMINECTOMY DISKECTOMY  2/1/2017    Procedure: LUMBAR LAMINECTOMY DISKECTOMY - L3-4, L4-5, L5-S1;  Surgeon: Shana Salamanca M.D.;  Location: [facility];  Service:    • LUMBAR DECOMPRESSION  2/1/2017    Procedure: LUMBAR DECOMPRESSION;  Surgeon: Shana Salaamnca M.D.;  Location: [facility];  Service:    • FORAMINOTOMY " Bilateral 2017    Procedure: FORAMINOTOMY;  Surgeon: Shana Salamanca M.D.;  Location: SURGERY [facility];  Service:    • HIP ARTHROSCOPY  08    Performed by AGAPITO CLAROS at SURGERY [facility]   • DEBRIDEMENT  08    Performed by AGAPITO CLAROS at SURGERY [facility]   • ROTATOR CUFF REPAIR Right    • ROTATOR CUFF REPAIR Left    • MASTECTOMY Left    • CATARACT EXTRACTION WITH IOL     • EGD ESOPHAGUS WITH ENDOSCOPIC US      with dilation    • TONSILLECTOMY      as child        FAMILY HISTORY:   History reviewed. No pertinent family history.     SOCIAL HISTORY:   Social History     Socioeconomic History   • Marital status:      Spouse name: Not on file   • Number of children: Not on file   • Years of education: Not on file   • Highest education level: Not on file   Occupational History   • Not on file   Social Needs   • Financial resource strain: Not on file   • Food insecurity     Worry: Not on file     Inability: Not on file   • Transportation needs     Medical: Not on file     Non-medical: Not on file   Tobacco Use   • Smoking status: Former Smoker     Packs/day: 0.00     Years: 30.00     Pack years: 0.00     Types: Cigars     Last attempt to quit: 1989     Years since quittin.5   • Smokeless tobacco: Never Used   Substance and Sexual Activity   • Alcohol use: Yes     Comment: 3-4 scotch a night   • Drug use: No   • Sexual activity: Not on file   Lifestyle   • Physical activity     Days per week: Not on file     Minutes per session: Not on file   • Stress: Not on file   Relationships   • Social connections     Talks on phone: Not on file     Gets together: Not on file     Attends Baptism service: Not on file     Active member of club or organization: Not on file     Attends meetings of clubs or organizations: Not on file     Relationship status: Not on file   • Intimate partner violence     Fear of current or ex partner: Not on file     Emotionally  abused: Not on file     Physically abused: Not on file     Forced sexual activity: Not on file   Other Topics Concern   • Not on file   Social History Narrative   • Not on file       ALLERGIES:   Allergies   Allergen Reactions   • Morphine         CURRENT MEDICATIONS:     Current Outpatient Medications:   •  amLODIPine (NORVASC) 5 MG Tab, Take 5 mg by mouth every day., Disp: , Rfl:   •  lisinopril (PRINIVIL) 5 MG Tab, Take 5 mg by mouth every day., Disp: , Rfl:   •  metoprolol SR (TOPROL XL) 25 MG TABLET SR 24 HR, Take 25 mg by mouth every day., Disp: , Rfl:   •  OMEPRAZOLE PO, Take  by mouth., Disp: , Rfl:   •  gabapentin (NEURONTIN) 100 MG Cap, Take 300 mg by mouth 3 times a day., Disp: , Rfl:   •  meloxicam (MOBIC) 7.5 MG Tab, Take 15 mg by mouth every day., Disp: , Rfl:   •  ranitidine (ZANTAC) 150 MG Tab, Take 150 mg by mouth 2 times a day., Disp: , Rfl:   •  metformin (GLUCOPHAGE) 500 MG Tab, Take 500 mg by mouth 2 times a day, with meals., Disp: , Rfl:   •  capsaicin (ZOSTRIX) 0.025 % cream, Apply 1 Application to affected area(s) 4 times a day. knees , Disp: , Rfl:   •  tamsulosin (FLOMAX) 0.4 MG capsule, Take 0.4 mg by mouth ONE-HALF HOUR AFTER BREAKFAST., Disp: , Rfl:   •  atorvastatin (LIPITOR) 20 MG Tab, Take 20 mg by mouth every evening., Disp: , Rfl:      LABS REVIEWED:  Lab Results   Component Value Date/Time    SODIUM 138 09/25/2017 02:36 AM    POTASSIUM 3.7 09/25/2017 02:36 AM    CHLORIDE 109 09/25/2017 02:36 AM    CO2 22 09/25/2017 02:36 AM    GLUCOSE 115 (H) 09/25/2017 02:36 AM    BUN 4 (L) 09/25/2017 02:36 AM    CREATININE 0.42 (L) 09/25/2017 02:36 AM    CREATININE 0.9 07/28/2008 01:10 PM      Lab Results   Component Value Date/Time    PROTHROMBTM 14.6 09/21/2017 05:44 PM    INR 1.10 09/21/2017 05:44 PM      Lab Results   Component Value Date/Time    WBC 8.3 09/25/2017 02:36 AM    RBC 2.73 (L) 09/25/2017 02:36 AM    HEMOGLOBIN 8.4 (L) 09/25/2017 02:36 AM    HEMATOCRIT 25.3 (L) 09/25/2017 02:36 AM     MCV 92.7 09/25/2017 02:36 AM    MCH 30.8 09/25/2017 02:36 AM    MCHC 33.2 (L) 09/25/2017 02:36 AM    RDW 55.4 (H) 09/25/2017 02:36 AM    PLATELETCT 181 09/25/2017 02:36 AM    MPV 9.2 09/25/2017 02:36 AM    NEUTSPOLYS 63.30 09/25/2017 02:36 AM    LYMPHOCYTES 14.70 (L) 09/25/2017 02:36 AM    MONOCYTES 14.70 (H) 09/25/2017 02:36 AM    EOSINOPHILS 6.30 09/25/2017 02:36 AM    BASOPHILS 0.40 09/25/2017 02:36 AM        IMAGING REVIEWED AND INTERPRETED:    ECHOCARDIOGRAM: 3/3/2020 [facility]  Left ventricle is grossly normal in size.  Mild to moderate LVH.  EF 50%.  Left atrium mildly dilated.  Mitral valve appears thickened, but opens adequately.  Aortic valve is calcified.  Thickened with reduced mobility.   Severe AS.  Valve area measures 0.8cm2  Normal aortic root.  Root sclerosis/calcification present.        ANGIOGRAM: 6/3/2020 [facility]:  Normal coronary arteries.  Severe AS    REVIEW OF SYSTEMS:   Review of Systems   Constitutional: Negative for chills, diaphoresis, fever and weight loss.   HENT: Negative for congestion, ear pain, hearing loss, nosebleeds, sore throat and tinnitus.    Eyes: Negative for blurred vision, double vision, pain and discharge.   Respiratory: Negative for cough, hemoptysis, sputum production, wheezing and stridor.    Cardiovascular: Negative for chest pain, palpitations, orthopnea and leg swelling.   Gastrointestinal: Negative for abdominal pain, constipation, heartburn, melena, nausea and vomiting.   Genitourinary: Negative for dysuria, flank pain and hematuria.   Musculoskeletal: Positive for joint pain. Negative for myalgias and neck pain.   Skin: Negative for itching and rash.   Neurological: Negative for dizziness, speech change, focal weakness, seizures, weakness and headaches.   Endo/Heme/Allergies: Negative for environmental allergies and polydipsia. Does not bruise/bleed easily.   Psychiatric/Behavioral: Negative for depression, hallucinations, substance abuse and suicidal ideas.  "      PHYSICAL EXAMINATION: 51  Physical Exam   Constitutional: He is oriented to person, place, and time and well-developed, well-nourished, and in no distress. No distress.   HENT:   Head: Normocephalic and atraumatic.   Nose: Nose normal.   Mouth/Throat: Oropharynx is clear and moist.   Eyes: Pupils are equal, round, and reactive to light. Conjunctivae and EOM are normal.   Neck: Normal range of motion. Neck supple. No JVD present. No tracheal deviation present.   Cardiovascular: Normal rate and regular rhythm.   Murmur heard.  Pulmonary/Chest: Effort normal and breath sounds normal. No stridor. No respiratory distress.   Abdominal: Soft. Bowel sounds are normal. He exhibits no distension. There is no abdominal tenderness.   Musculoskeletal: Normal range of motion.         General: No tenderness or edema.   Neurological: He is alert and oriented to person, place, and time. Gait normal. Coordination normal. GCS score is 15.   Skin: Skin is warm and dry.   Psychiatric: Mood, memory and affect normal.     CONSTITUTIONAL:  /78 (BP Location: Left arm, Patient Position: Sitting, BP Cuff Size: Adult)   Pulse 68   Temp 36.3 °C (97.4 °F) (Temporal)   Ht 1.753 m (5' 9\")   Wt 100.2 kg (221 lb)   SpO2 98%         IMPRESSION:  Severe symptomatic aortic stenosis, COPD, sleep apnea, history of GI bleed status post ileostomy      PLAN:  I recommend continued work-up and scheduling for transcatheter aortic valve replacement.  Given his age and comorbidities, I do not believe he is a good candidate for surgical intervention.  The patient is very eager to finish work-up and get his valve replaced disease wanting to get back to a higher level of activity.  The procedure, its risks, benefits, potential complications and alternative treatments were discussed with the patient in detail including the risks should he decide not to undergo my recommended treatment. All of his questions were answered to his satisfaction and he " is willing to proceed with the operation. The risks include death, stroke,  infection: to include a rare bacterial infection related to the use of the heart/lung machine, matt-operative myocardial infarction, dysrhythmias, diaphragmatic paralysis, chest wall paresthesia, tracheostomy, kidney or other organ failure, possible return to the operating room for bleeding, bleeding requiring transfusion with its attendant risks including AIDS or hepatitis, dehiscence of surgical incisions, respiratory complications including the need for prolonged ventilator support, Protamine or other drug reaction, peripheral neuropathy, loss of limb, and miscount of surgical items. The operative mortality risk is approximately 5-8 %. The STS mortality risk score is 2.87% and the morbidity and mortality risk score is 17.4%. The scores were discussed with patient.          Sincerely,       Marvin Salcedo DO.

## 2020-07-01 ENCOUNTER — OFFICE VISIT (OUTPATIENT)
Dept: CARDIOTHORACIC SURGERY | Facility: MEDICAL CENTER | Age: 85
End: 2020-07-01
Payer: COMMERCIAL

## 2020-07-01 VITALS
DIASTOLIC BLOOD PRESSURE: 78 MMHG | BODY MASS INDEX: 32.73 KG/M2 | SYSTOLIC BLOOD PRESSURE: 118 MMHG | HEART RATE: 68 BPM | TEMPERATURE: 97.4 F | HEIGHT: 69 IN | OXYGEN SATURATION: 98 % | WEIGHT: 221 LBS

## 2020-07-01 DIAGNOSIS — I35.0 SEVERE AORTIC STENOSIS: ICD-10-CM

## 2020-07-01 PROCEDURE — 99205 OFFICE O/P NEW HI 60 MIN: CPT | Performed by: THORACIC SURGERY (CARDIOTHORACIC VASCULAR SURGERY)

## 2020-07-01 RX ORDER — LISINOPRIL 5 MG/1
5 TABLET ORAL DAILY
COMMUNITY
End: 2020-07-21

## 2020-07-01 RX ORDER — AMLODIPINE BESYLATE 5 MG/1
5 TABLET ORAL DAILY
COMMUNITY
End: 2022-02-18 | Stop reason: SDUPTHER

## 2020-07-01 RX ORDER — METOPROLOL SUCCINATE 25 MG/1
25 TABLET, EXTENDED RELEASE ORAL
Status: ON HOLD | COMMUNITY
End: 2020-07-28 | Stop reason: SDUPTHER

## 2020-07-01 ASSESSMENT — ENCOUNTER SYMPTOMS
MYALGIAS: 0
SPUTUM PRODUCTION: 0
WEIGHT LOSS: 0
BRUISES/BLEEDS EASILY: 0
EYE DISCHARGE: 0
CHILLS: 0
DIZZINESS: 0
CONSTIPATION: 0
FLANK PAIN: 0
SPEECH CHANGE: 0
WEAKNESS: 0
DOUBLE VISION: 0
WHEEZING: 0
HALLUCINATIONS: 0
ABDOMINAL PAIN: 0
COUGH: 0
SORE THROAT: 0
STRIDOR: 0
VOMITING: 0
HEMOPTYSIS: 0
ORTHOPNEA: 0
NAUSEA: 0
FOCAL WEAKNESS: 0
DEPRESSION: 0
BLURRED VISION: 0
EYE PAIN: 0
SEIZURES: 0
PALPITATIONS: 0
HEADACHES: 0
HEARTBURN: 0
POLYDIPSIA: 0
DIAPHORESIS: 0
NECK PAIN: 0
FEVER: 0

## 2020-07-01 ASSESSMENT — LIFESTYLE VARIABLES: SUBSTANCE_ABUSE: 0

## 2020-07-07 DIAGNOSIS — R06.02 SHORTNESS OF BREATH: ICD-10-CM

## 2020-07-07 DIAGNOSIS — I35.0 SEVERE AORTIC STENOSIS: ICD-10-CM

## 2020-07-07 DIAGNOSIS — Z01.810 PRE-OPERATIVE CARDIOVASCULAR EXAMINATION: ICD-10-CM

## 2020-07-07 NOTE — PROGRESS NOTES
Spoke with patient regarding valve clinic referral. Reviewed apts and testing needed for such.     Patient confirms he has completed carotid ultrasound at [facility], as well as labs at [facility] 6/3/2020.     Assured patient that we will obtain lab records.     Reviewed CTA screening questions: patient states no diabetes, no iodine allergy, no port, and no to all other screening questions.     Reviewed date, time, location for all such appointments. Patient states understanding, reads back accurately, states no further questions at this time and thankful for call.

## 2020-07-10 ENCOUNTER — DOCUMENTATION (OUTPATIENT)
Dept: CARDIOLOGY | Facility: MEDICAL CENTER | Age: 85
End: 2020-07-10

## 2020-07-10 NOTE — PROGRESS NOTES
Sent message to [facility] coordinator requesting stat auth for CTAs and IC consult scheduled 7/15 and 7/16. Informed that if no auth received such appointments will need to be cancelled as patients secondary insurance is not contracted within [facility].

## 2020-07-15 ENCOUNTER — OFFICE VISIT (OUTPATIENT)
Dept: CARDIOLOGY | Facility: MEDICAL CENTER | Age: 85
End: 2020-07-15
Payer: COMMERCIAL

## 2020-07-15 VITALS
HEIGHT: 69 IN | RESPIRATION RATE: 14 BRPM | BODY MASS INDEX: 32.58 KG/M2 | HEART RATE: 79 BPM | DIASTOLIC BLOOD PRESSURE: 88 MMHG | OXYGEN SATURATION: 96 % | SYSTOLIC BLOOD PRESSURE: 112 MMHG | WEIGHT: 220 LBS

## 2020-07-15 DIAGNOSIS — I35.0 AORTIC STENOSIS, SEVERE: ICD-10-CM

## 2020-07-15 LAB — EKG IMPRESSION: NORMAL

## 2020-07-15 PROCEDURE — 93000 ELECTROCARDIOGRAM COMPLETE: CPT | Performed by: INTERNAL MEDICINE

## 2020-07-15 PROCEDURE — 99205 OFFICE O/P NEW HI 60 MIN: CPT | Performed by: INTERNAL MEDICINE

## 2020-07-15 RX ORDER — DIPHENOXYLATE HYDROCHLORIDE AND ATROPINE SULFATE 2.5; .025 MG/1; MG/1
1 TABLET ORAL 4 TIMES DAILY PRN
COMMUNITY
End: 2020-07-21

## 2020-07-15 NOTE — PROGRESS NOTES
APRN Psychosocial Assessment:    Mental Status Assessment:  Appearance: normal   Behavior: normal   Mood/Affect: normal   Thought process/content: normal   Cognition:  Normal   Functional ability:  Able to get around the house and do ADL  Dental Concerns: none   [facility] Clearance warranted[facility]  Further mental assessment needed: n/a    Post-op Plan of Care:  Support systems: wife Barbara   Patient understands discharge plan: none   DME: cpap and uses cane to ambulate   Home health warranted prior to procedure: none  Social concerns for discharge: none   PCP alerted of social concerns: none    Advance directive: form provided to the patient       Concerns prior to procedure: pain management, morphine, norco, and percocet he has had adverse effect with AMS.     All patients questions and concerns were addressed during this visit. They understood pre-operative and post-operative plan of care.

## 2020-07-15 NOTE — PROGRESS NOTES
"    Cardiology Initial Consultation Note    Date of note:    7/15/2020    Primary Care Provider: Corey Jamison M.D.  Referring Provider: No ref. provider found     Patient Name: Jamil Kendall     YOB: 1935  MRN:              [id number]    Chief Complaint: Fatigue, dyspnea on exertion    Jamil Kendall is a 85 y.o. male  patient presented today with fatigue, dyspnea on exertion.  He initially wanted to have his knee surgery, underwent evaluation with echocardiogram which showed severe aortic stenosis, subsequently patient had coronary angiogram, valve study.  He has been complaining of decreased exercise tolerance, fatigue, dyspnea on exertion, moderate intensity symptoms, gradually worsening over last few months.  He is here with his wife.  He does take care of himself and do yard work etc.  He had a history of GI bleed, cardiac arrest needing colectomy, had ileostomy bag since then.  No recent infection with colostomy.  He also had history of COPD, FEV1 was 68%, hypertension, hyperlipidemia, skin cancers, sleep apnea on CPAP therapy.    ROS      All other systems reviewed and discussed using a comprehensive questionnaire and are negative.     Past medical history, family history, social history, allergies and labs are reviewed and updated as needed as documented below.    Past Medical History:   Diagnosis Date   • Arthritis     back   • Cancer (HCC)    • Cataract     bilat IOL    • Cold 12/27/2016   • COPD (chronic obstructive pulmonary disease) (HCC)    • Coughing blood    • Emphysema of lung (HCC)    • Heart murmur    • Heart valve disease     \"leaky heart valve\"    • Hiatus hernia syndrome    • High cholesterol    • Hypertension    • Pain     back, right knees   • Pneumonia 2014   • Skin cancer 1990   • Sleep apnea     CPAP   • Snoring          Past Surgical History:   Procedure Laterality Date   • EXPLORATORY LAPAROTOMY  9/21/2017    Procedure: Emergent Subtotal colectomy " with ileostomy, mobilization of splenic flexure;  Surgeon: Jamil Wagner M.D.;  Location: SURGERY [facility];  Service: General   • LUMBAR LAMINECTOMY DISKECTOMY  2/1/2017    Procedure: LUMBAR LAMINECTOMY DISKECTOMY - L3-4, L4-5, L5-S1;  Surgeon: Shana Salamanca M.D.;  Location: SURGERY [facility];  Service:    • LUMBAR DECOMPRESSION  2/1/2017    Procedure: LUMBAR DECOMPRESSION;  Surgeon: Shana Salamanca M.D.;  Location: SURGERY [facility];  Service:    • FORAMINOTOMY Bilateral 2/1/2017    Procedure: FORAMINOTOMY;  Surgeon: Shana Salamanca M.D.;  Location: SURGERY [facility];  Service:    • HIP ARTHROSCOPY  8/4/08    Performed by AGAPITO CLAROS at [facility]   • DEBRIDEMENT  8/4/08    Performed by AGAPITO CLAROS at [facility]   • ROTATOR CUFF REPAIR Right 1999   • ROTATOR CUFF REPAIR Left 1995   • MASTECTOMY Left 1965   • CATARACT EXTRACTION WITH IOL     • EGD ESOPHAGUS WITH ENDOSCOPIC US  1990s    with dilation    • TONSILLECTOMY      as child          Current Outpatient Medications   Medication Sig Dispense Refill   • amLODIPine (NORVASC) 5 MG Tab Take 5 mg by mouth every day.     • lisinopril (PRINIVIL) 5 MG Tab Take 5 mg by mouth every day.     • metoprolol SR (TOPROL XL) 25 MG TABLET SR 24 HR Take 25 mg by mouth every day.     • OMEPRAZOLE PO Take  by mouth.     • gabapentin (NEURONTIN) 100 MG Cap Take 300 mg by mouth 3 times a day.     • meloxicam (MOBIC) 7.5 MG Tab Take 15 mg by mouth every day.     • ranitidine (ZANTAC) 150 MG Tab Take 150 mg by mouth 2 times a day.     • metformin (GLUCOPHAGE) 500 MG Tab Take 500 mg by mouth 2 times a day, with meals.     • capsaicin (ZOSTRIX) 0.025 % cream Apply 1 Application to affected area(s) 4 times a day. knees      • tamsulosin (FLOMAX) 0.4 MG capsule Take 0.4 mg by mouth ONE-HALF HOUR AFTER BREAKFAST.     • atorvastatin (LIPITOR) 20 MG Tab Take 20 mg by mouth every evening.       No current  facility-administered medications for this visit.          Allergies   Allergen Reactions   • Morphine          No family history of bicuspid aortic valve disease    Social History     Socioeconomic History   • Marital status:      Spouse name: Not on file   • Number of children: Not on file   • Years of education: Not on file   • Highest education level: Not on file   Occupational History   • Not on file   Social Needs   • Financial resource strain: Not on file   • Food insecurity     Worry: Not on file     Inability: Not on file   • Transportation needs     Medical: Not on file     Non-medical: Not on file   Tobacco Use   • Smoking status: Former Smoker     Packs/day: 0.00     Years: 30.00     Pack years: 0.00     Types: Cigars     Last attempt to quit: 1989     Years since quittin.5   • Smokeless tobacco: Never Used   Substance and Sexual Activity   • Alcohol use: Yes     Comment: 3-4 scotch a night   • Drug use: No   • Sexual activity: Not on file   Lifestyle   • Physical activity     Days per week: Not on file     Minutes per session: Not on file   • Stress: Not on file   Relationships   • Social connections     Talks on phone: Not on file     Gets together: Not on file     Attends Anabaptism service: Not on file     Active member of club or organization: Not on file     Attends meetings of clubs or organizations: Not on file     Relationship status: Not on file   • Intimate partner violence     Fear of current or ex partner: Not on file     Emotionally abused: Not on file     Physically abused: Not on file     Forced sexual activity: Not on file   Other Topics Concern   • Not on file   Social History Narrative   • Not on file         Physical Exam:  Ambulatory Vitals  There were no vitals taken for this visit.   Oxygen Therapy:     BP Readings from Last 4 Encounters:   20 118/78   17 132/75   17 119/61   13 162/84       Weight/BMI: There is no height or weight on file to  calculate BMI.  Wt Readings from Last 4 Encounters:   07/01/20 100.2 kg (221 lb)   09/24/17 106.4 kg (234 lb 9.1 oz)   02/01/17 100 kg (220 lb 7.4 oz)   04/30/13 95.3 kg (210 lb)           General: Well appearing and in no apparent distress  Head: atrumatic  Eyes: No conjunctival pallor   ENT: normal external appearance of nose and ears  Neck: JVD absent, carotid bruits absent  Lungs: respiratory sounds  normal, additional breath sounds absent  Heart: Regular rhythm,   No palpable thrills on palpation, 3 x 6 systolic murmur aortic area, no rubs,   Lower extremity edema absent.   Pedal pulses normal  Abdomen: soft, non tender, non distended.  Extremities/MSK: no clubbing, no cyanosis  Neurological: normal orientation, Gait normal   Psychiatric: Appropriate affect, intact judgement and insight  Skin: Warm extremities        Lab Data Review:  Lab Results   Component Value Date/Time    TRIGLYCERIDE 153 (H) 09/23/2017 06:15 AM       Lab Results   Component Value Date/Time    SODIUM 138 09/25/2017 02:36 AM    POTASSIUM 3.7 09/25/2017 02:36 AM    CHLORIDE 109 09/25/2017 02:36 AM    CO2 22 09/25/2017 02:36 AM    GLUCOSE 115 (H) 09/25/2017 02:36 AM    BUN 4 (L) 09/25/2017 02:36 AM    CREATININE 0.42 (L) 09/25/2017 02:36 AM    CREATININE 0.9 07/28/2008 01:10 PM     Lab Results   Component Value Date/Time    ALKPHOSPHAT 72 09/25/2017 02:36 AM    ASTSGOT 51 (H) 09/25/2017 02:36 AM    ALTSGPT 47 09/25/2017 02:36 AM    TBILIRUBIN 0.6 09/25/2017 02:36 AM      Lab Results   Component Value Date/Time    WBC 8.3 09/25/2017 02:36 AM     EKG 3/3/20  Sinus rhythm, no bundle branch block  Carotid exam 6/19/2020 no significant carotid artery disease  PFTs, FEV1 2.05 L 68%    Echocardiogram March 2020:  Left ventricular ejection fraction 50%, aortic valve area 0.8 cm².  Velocity across the aortic valve is 3.59 cm/s, dimensionless index is 0.22.    Coronary angiogram 6/3/2020  Cardiac output 4.36, mean gradient across the aortic valve is  33, aortic valve area 0.8  Normal coronary arteries, dominant RCA    Medical Decision Makin-year-old male patient with history of total colectomy, ileostomy, hypertension, hyperlipidemia, COPD, now with severe low-flow low gradient aortic stenosis.  NYHA class III stage C symptoms  Patient is a good candidate for transcatheter aortic valve replacement.  Based on his symptomatology and aortic valve study I am sure he has severe aortic stenosis, however because of low gradient I will refer him to have dobutamine stress echocardiogram to prove severe aortic stenosis.    The risks, benefits, and alternatives to TAVR, general anesthesia and transesophageal echocardiogram were discussed in great detail. Specific risks mentioned include bleeding, infection, kidney damage, allergic reaction, cardiac perforation with possible tamponade requiring matt-cardiocentesis or possible open heart surgery if the patient is a candidate. Lastly the risks of heart attack, stroke, and death were discussed; the risks of major complications includingall cause mortality of 2.2%, disabling stroke of 1.1%, the risk on new pacemaker of 12% and the risk of vascular complications of 4.1%. The patient verbalized understanding of these potential complications and wishes to proceed with this procedure. (Source [facility] Registry).  The procedure will be performed completely in collaboration with cardiac surgery.      This note was dictated using Dragon speech recognition software.    Rubén YODER  Interventional cardiologist  [facility], [facility]  [address] [address]  Phone: [phone]  Fax: [phone]

## 2020-07-16 ENCOUNTER — HOSPITAL ENCOUNTER (OUTPATIENT)
Dept: RADIOLOGY | Facility: MEDICAL CENTER | Age: 85
End: 2020-07-16
Attending: INTERNAL MEDICINE
Payer: COMMERCIAL

## 2020-07-16 ENCOUNTER — TELEPHONE (OUTPATIENT)
Dept: CARDIOLOGY | Facility: MEDICAL CENTER | Age: 85
End: 2020-07-16

## 2020-07-16 DIAGNOSIS — Z00.6 EXAMINATION OF PARTICIPANT IN CLINICAL TRIAL: ICD-10-CM

## 2020-07-16 DIAGNOSIS — R06.02 SHORTNESS OF BREATH: ICD-10-CM

## 2020-07-16 DIAGNOSIS — I35.0 SEVERE AORTIC STENOSIS: ICD-10-CM

## 2020-07-16 DIAGNOSIS — Z01.810 PRE-OPERATIVE CARDIOVASCULAR EXAMINATION: ICD-10-CM

## 2020-07-16 PROCEDURE — 700117 HCHG RX CONTRAST REV CODE 255: Performed by: INTERNAL MEDICINE

## 2020-07-16 PROCEDURE — 74174 CTA ABD&PLVS W/CONTRAST: CPT

## 2020-07-16 PROCEDURE — 71275 CT ANGIOGRAPHY CHEST: CPT

## 2020-07-16 RX ADMIN — IOHEXOL 100 ML: 350 INJECTION, SOLUTION INTRAVENOUS at 11:00

## 2020-07-16 NOTE — TELEPHONE ENCOUNTER
Patient calling to verify again his apt for DSE.     Reviewed date, time, location for DSE including hold metoprolol 24 hours and NPO 4 hours prior to DSE.     Patient states understanding and thankful for assistance.

## 2020-07-16 NOTE — TELEPHONE ENCOUNTER
Patient calling to verify scheduled appointments.     Reviewed he has CTAs today 7/16 at 1030 as well as DSE tomorrow 7/17. Reviewed that per the notes patient is to hold metoprolol x24 hours and NPO 4 hours prior to DSE.     Patient repeats back information correctly, states understanding, and states no further questions at this time.

## 2020-07-17 ENCOUNTER — HOSPITAL ENCOUNTER (OUTPATIENT)
Dept: CARDIOLOGY | Facility: MEDICAL CENTER | Age: 85
End: 2020-07-17
Attending: INTERNAL MEDICINE
Payer: COMMERCIAL

## 2020-07-17 DIAGNOSIS — I35.0 AORTIC STENOSIS, SEVERE: ICD-10-CM

## 2020-07-17 PROCEDURE — 93325 DOPPLER ECHO COLOR FLOW MAPG: CPT | Mod: 26 | Performed by: INTERNAL MEDICINE

## 2020-07-17 PROCEDURE — 93308 TTE F-UP OR LMTD: CPT | Mod: 26 | Performed by: INTERNAL MEDICINE

## 2020-07-17 PROCEDURE — 93321 DOPPLER ECHO F-UP/LMTD STD: CPT

## 2020-07-17 PROCEDURE — 93321 DOPPLER ECHO F-UP/LMTD STD: CPT | Mod: 26 | Performed by: INTERNAL MEDICINE

## 2020-07-21 ENCOUNTER — DOCUMENTATION (OUTPATIENT)
Dept: CARDIOLOGY | Facility: MEDICAL CENTER | Age: 85
End: 2020-07-21

## 2020-07-21 DIAGNOSIS — Z01.812 PRE-OPERATIVE LABORATORY EXAMINATION: ICD-10-CM

## 2020-07-21 LAB
ABO GROUP BLD: NORMAL
ALBUMIN SERPL BCP-MCNC: 4.1 G/DL (ref 3.2–4.9)
ALBUMIN/GLOB SERPL: 1.2 G/DL
ALP SERPL-CCNC: 60 U/L (ref 30–99)
ALT SERPL-CCNC: 31 U/L (ref 2–50)
ANION GAP SERPL CALC-SCNC: 13 MMOL/L (ref 7–16)
APTT PPP: 30.2 SEC (ref 24.7–36)
AST SERPL-CCNC: 24 U/L (ref 12–45)
BASOPHILS # BLD AUTO: 0.5 % (ref 0–1.8)
BASOPHILS # BLD: 0.05 K/UL (ref 0–0.12)
BILIRUB SERPL-MCNC: 0.2 MG/DL (ref 0.1–1.5)
BLD GP AB SCN SERPL QL: NORMAL
BUN SERPL-MCNC: 9 MG/DL (ref 8–22)
CALCIUM SERPL-MCNC: 9.4 MG/DL (ref 8.5–10.5)
CHLORIDE SERPL-SCNC: 97 MMOL/L (ref 96–112)
CO2 SERPL-SCNC: 20 MMOL/L (ref 20–33)
CREAT SERPL-MCNC: 0.6 MG/DL (ref 0.5–1.4)
EOSINOPHIL # BLD AUTO: 0.36 K/UL (ref 0–0.51)
EOSINOPHIL NFR BLD: 3.4 % (ref 0–6.9)
ERYTHROCYTE [DISTWIDTH] IN BLOOD BY AUTOMATED COUNT: 52.6 FL (ref 35.9–50)
GLOBULIN SER CALC-MCNC: 3.3 G/DL (ref 1.9–3.5)
GLUCOSE SERPL-MCNC: 89 MG/DL (ref 65–99)
HCT VFR BLD AUTO: 39 % (ref 42–52)
HGB BLD-MCNC: 12.1 G/DL (ref 14–18)
IMM GRANULOCYTES # BLD AUTO: 0.04 K/UL (ref 0–0.11)
IMM GRANULOCYTES NFR BLD AUTO: 0.4 % (ref 0–0.9)
INR PPP: 1.02 (ref 0.87–1.13)
LYMPHOCYTES # BLD AUTO: 1.74 K/UL (ref 1–4.8)
LYMPHOCYTES NFR BLD: 16.3 % (ref 22–41)
MCH RBC QN AUTO: 26.8 PG (ref 27–33)
MCHC RBC AUTO-ENTMCNC: 31 G/DL (ref 33.7–35.3)
MCV RBC AUTO: 86.3 FL (ref 81.4–97.8)
MONOCYTES # BLD AUTO: 1.23 K/UL (ref 0–0.85)
MONOCYTES NFR BLD AUTO: 11.5 % (ref 0–13.4)
NEUTROPHILS # BLD AUTO: 7.27 K/UL (ref 1.82–7.42)
NEUTROPHILS NFR BLD: 67.9 % (ref 44–72)
NRBC # BLD AUTO: 0 K/UL
NRBC BLD-RTO: 0 /100 WBC
NT-PROBNP SERPL IA-MCNC: 61 PG/ML (ref 0–125)
PLATELET # BLD AUTO: 268 K/UL (ref 164–446)
PMV BLD AUTO: 8.7 FL (ref 9–12.9)
POTASSIUM SERPL-SCNC: 4.8 MMOL/L (ref 3.6–5.5)
PROT SERPL-MCNC: 7.4 G/DL (ref 6–8.2)
PROTHROMBIN TIME: 13.7 SEC (ref 12–14.6)
RBC # BLD AUTO: 4.52 M/UL (ref 4.7–6.1)
RH BLD: NORMAL
SODIUM SERPL-SCNC: 130 MMOL/L (ref 135–145)
WBC # BLD AUTO: 10.7 K/UL (ref 4.8–10.8)

## 2020-07-21 PROCEDURE — 86850 RBC ANTIBODY SCREEN: CPT

## 2020-07-21 PROCEDURE — 86900 BLOOD TYPING SEROLOGIC ABO: CPT

## 2020-07-21 PROCEDURE — 36415 COLL VENOUS BLD VENIPUNCTURE: CPT

## 2020-07-21 PROCEDURE — 85730 THROMBOPLASTIN TIME PARTIAL: CPT

## 2020-07-21 PROCEDURE — 83880 ASSAY OF NATRIURETIC PEPTIDE: CPT

## 2020-07-21 PROCEDURE — 85025 COMPLETE CBC W/AUTO DIFF WBC: CPT

## 2020-07-21 PROCEDURE — 80053 COMPREHEN METABOLIC PANEL: CPT

## 2020-07-21 PROCEDURE — 85610 PROTHROMBIN TIME: CPT

## 2020-07-21 PROCEDURE — 86901 BLOOD TYPING SEROLOGIC RH(D): CPT

## 2020-07-21 NOTE — PROGRESS NOTES
Valve Program Functional Assessment: pre TAVR    KCCQ12   1a) Showering/bathin  1b) Walking 1 block on ground: 5  1c) Hurrying or joggin  2) Swellin  3) Fatigue: 1  4) Shortness of breath: 7  5) Sleep sitting up: 5  6) Limited enjoyment of life: 2  7) Spend the rest of your life with HF: 1  8a) Hobbies, recreational activities:6  8b) Working or doing household chores:2  8c) Visiting family or friends: 4    5 meter walk test  1) __5.72____ s/5m  2) __7.02____ s/5m  3) __8.98____ s/5m     Strength   1) _32_____ kg  2) _28_____ kg  3) _28_____ kg    BOLAND ADLs  Patient independently preforms...   - Bathing: Yes  - Dressing: Yes   - Toileting: Yes   - Transferring: Yes   - Continence: Yes   - Feeding: Yes     Living Situation  Patient lives:  with spouse    Mobility Aids   Patient uses: cane    Patients post operative goal: he would like to resume yard work and house chores.

## 2020-07-22 ENCOUNTER — DOCUMENTATION (OUTPATIENT)
Dept: CARDIOLOGY | Facility: MEDICAL CENTER | Age: 85
End: 2020-07-22

## 2020-07-22 NOTE — PROGRESS NOTES
Valve Conference Heart Team Plan of Care: 7/22/20    Valve candidate: Yes  With Possible open heart: Yes  Access: left iliac  Valve Size: 26 S3 ultra  Cerebral Protection: none  Anesthesia: GA  Unit: TELE  Incidental findings: none  Clearance needed prior to procedure: none      Plan of care: Proceed with TAVR as planned 7/27/20.      Spoke with patient to update with heart team's recommendations. Reviewed pre-operative instructions including hold metformin x5 days, hold omega 3 and vit C x5 days, NPO midnight prior, check in 0530 7/27/20. Patient states understanding of all information, agrees with the plan, and states no further questions at this time.

## 2020-07-23 ENCOUNTER — OFFICE VISIT (OUTPATIENT)
Dept: ADMISSIONS | Facility: MEDICAL CENTER | Age: 85
DRG: 267 | End: 2020-07-23
Attending: INTERNAL MEDICINE
Payer: COMMERCIAL

## 2020-07-23 DIAGNOSIS — Z01.812 PRE-OPERATIVE LABORATORY EXAMINATION: ICD-10-CM

## 2020-07-23 LAB — COVID ORDER STATUS COVID19: NORMAL

## 2020-07-23 PROCEDURE — U0003 INFECTIOUS AGENT DETECTION BY NUCLEIC ACID (DNA OR RNA); SEVERE ACUTE RESPIRATORY SYNDROME CORONAVIRUS 2 (SARS-COV-2) (CORONAVIRUS DISEASE [COVID-19]), AMPLIFIED PROBE TECHNIQUE, MAKING USE OF HIGH THROUGHPUT TECHNOLOGIES AS DESCRIBED BY CMS-2020-01-R: HCPCS

## 2020-07-24 LAB
SARS-COV-2 RNA RESP QL NAA+PROBE: NOTDETECTED
SPECIMEN SOURCE: NORMAL

## 2020-07-25 NOTE — PROGRESS NOTES
COVID-19 Pre-surgery screenin. Do you have an undiagnosed respiratory illness or symptoms such as coughing or sneezing? (No)  a. Onset of Sx  b. Acute vs. chronic respiratory illness      2. Do you have an unexplained fever greater than 100.4 degrees Fahrenheit or 38 degrees Celsius?                     (No)     3. Have you had direct exposure to a patient who tested positive for Covid-19?                          (No)     4. Have you traveled outside [facility] in the last 14 days? (No)     5. Have you had any loss of your sense of taste or smell? Have you had N/V or sore throat? (No)     Patient has been informed of visitor policy and asked to wear a mask upon entering the hospital   (Yes)

## 2020-07-27 ENCOUNTER — APPOINTMENT (OUTPATIENT)
Dept: RADIOLOGY | Facility: MEDICAL CENTER | Age: 85
DRG: 267 | End: 2020-07-27
Attending: NURSE PRACTITIONER
Payer: COMMERCIAL

## 2020-07-27 ENCOUNTER — APPOINTMENT (OUTPATIENT)
Dept: CARDIOLOGY | Facility: MEDICAL CENTER | Age: 85
DRG: 267 | End: 2020-07-27
Attending: ANESTHESIOLOGY
Payer: COMMERCIAL

## 2020-07-27 ENCOUNTER — HOSPITAL ENCOUNTER (INPATIENT)
Facility: MEDICAL CENTER | Age: 85
LOS: 1 days | DRG: 267 | End: 2020-07-28
Attending: INTERNAL MEDICINE | Admitting: INTERNAL MEDICINE
Payer: COMMERCIAL

## 2020-07-27 ENCOUNTER — TELEPHONE (OUTPATIENT)
Dept: CARDIOLOGY | Facility: MEDICAL CENTER | Age: 85
End: 2020-07-27

## 2020-07-27 ENCOUNTER — ANESTHESIA EVENT (OUTPATIENT)
Dept: SURGERY | Facility: MEDICAL CENTER | Age: 85
DRG: 267 | End: 2020-07-27
Payer: COMMERCIAL

## 2020-07-27 ENCOUNTER — ANESTHESIA (OUTPATIENT)
Dept: SURGERY | Facility: MEDICAL CENTER | Age: 85
DRG: 267 | End: 2020-07-27
Payer: COMMERCIAL

## 2020-07-27 DIAGNOSIS — Z95.2 S/P TAVR (TRANSCATHETER AORTIC VALVE REPLACEMENT): ICD-10-CM

## 2020-07-27 DIAGNOSIS — I44.7 LBBB (LEFT BUNDLE BRANCH BLOCK): ICD-10-CM

## 2020-07-27 PROBLEM — I35.0 NONRHEUMATIC AORTIC VALVE STENOSIS: Status: ACTIVE | Noted: 2020-07-27

## 2020-07-27 LAB
ALBUMIN SERPL BCP-MCNC: 3.9 G/DL (ref 3.2–4.9)
ALBUMIN/GLOB SERPL: 1.2 G/DL
ALP SERPL-CCNC: 56 U/L (ref 30–99)
ALT SERPL-CCNC: 30 U/L (ref 2–50)
ANION GAP SERPL CALC-SCNC: 15 MMOL/L (ref 7–16)
AST SERPL-CCNC: 25 U/L (ref 12–45)
BILIRUB SERPL-MCNC: 0.2 MG/DL (ref 0.1–1.5)
BUN SERPL-MCNC: 8 MG/DL (ref 8–22)
CALCIUM SERPL-MCNC: 9.3 MG/DL (ref 8.5–10.5)
CHLORIDE SERPL-SCNC: 97 MMOL/L (ref 96–112)
CO2 SERPL-SCNC: 20 MMOL/L (ref 20–33)
CREAT SERPL-MCNC: 0.61 MG/DL (ref 0.5–1.4)
EKG IMPRESSION: NORMAL
ERYTHROCYTE [DISTWIDTH] IN BLOOD BY AUTOMATED COUNT: 50.1 FL (ref 35.9–50)
GLOBULIN SER CALC-MCNC: 3.2 G/DL (ref 1.9–3.5)
GLUCOSE BLD-MCNC: 123 MG/DL (ref 65–99)
GLUCOSE BLD-MCNC: 128 MG/DL (ref 65–99)
GLUCOSE BLD-MCNC: 86 MG/DL (ref 65–99)
GLUCOSE BLD-MCNC: 92 MG/DL (ref 65–99)
GLUCOSE SERPL-MCNC: 106 MG/DL (ref 65–99)
HCT VFR BLD AUTO: 37.3 % (ref 42–52)
HGB BLD-MCNC: 11.9 G/DL (ref 14–18)
LV EJECT FRACT  99904: 55
MCH RBC QN AUTO: 26.7 PG (ref 27–33)
MCHC RBC AUTO-ENTMCNC: 31.9 G/DL (ref 33.7–35.3)
MCV RBC AUTO: 83.8 FL (ref 81.4–97.8)
NT-PROBNP SERPL IA-MCNC: 44 PG/ML (ref 0–125)
PLATELET # BLD AUTO: 223 K/UL (ref 164–446)
PMV BLD AUTO: 8.7 FL (ref 9–12.9)
POTASSIUM SERPL-SCNC: 4.7 MMOL/L (ref 3.6–5.5)
PROT SERPL-MCNC: 7.1 G/DL (ref 6–8.2)
RBC # BLD AUTO: 4.45 M/UL (ref 4.7–6.1)
SODIUM SERPL-SCNC: 132 MMOL/L (ref 135–145)
WBC # BLD AUTO: 12.3 K/UL (ref 4.8–10.8)

## 2020-07-27 PROCEDURE — 71045 X-RAY EXAM CHEST 1 VIEW: CPT

## 2020-07-27 PROCEDURE — 502240 HCHG MISC OR SUPPLY RC 0272: Performed by: INTERNAL MEDICINE

## 2020-07-27 PROCEDURE — 93308 TTE F-UP OR LMTD: CPT

## 2020-07-27 PROCEDURE — 160036 HCHG PACU - EA ADDL 30 MINS PHASE I: Performed by: INTERNAL MEDICINE

## 2020-07-27 PROCEDURE — 80053 COMPREHEN METABOLIC PANEL: CPT

## 2020-07-27 PROCEDURE — 93010 ELECTROCARDIOGRAM REPORT: CPT | Performed by: INTERNAL MEDICINE

## 2020-07-27 PROCEDURE — 700117 HCHG RX CONTRAST REV CODE 255: Performed by: INTERNAL MEDICINE

## 2020-07-27 PROCEDURE — 700105 HCHG RX REV CODE 258: Performed by: INTERNAL MEDICINE

## 2020-07-27 PROCEDURE — 93005 ELECTROCARDIOGRAM TRACING: CPT | Performed by: NURSE PRACTITIONER

## 2020-07-27 PROCEDURE — 83880 ASSAY OF NATRIURETIC PEPTIDE: CPT

## 2020-07-27 PROCEDURE — A9270 NON-COVERED ITEM OR SERVICE: HCPCS | Performed by: INTERNAL MEDICINE

## 2020-07-27 PROCEDURE — 110372 HCHG SHELL REV 278: Performed by: INTERNAL MEDICINE

## 2020-07-27 PROCEDURE — 160042 HCHG SURGERY MINUTES - EA ADDL 1 MIN LEVEL 5: Performed by: INTERNAL MEDICINE

## 2020-07-27 PROCEDURE — C1894 INTRO/SHEATH, NON-LASER: HCPCS | Performed by: INTERNAL MEDICINE

## 2020-07-27 PROCEDURE — 700105 HCHG RX REV CODE 258: Performed by: NURSE PRACTITIONER

## 2020-07-27 PROCEDURE — C1769 GUIDE WIRE: HCPCS | Performed by: INTERNAL MEDICINE

## 2020-07-27 PROCEDURE — 160031 HCHG SURGERY MINUTES - 1ST 30 MINS LEVEL 5: Performed by: INTERNAL MEDICINE

## 2020-07-27 PROCEDURE — 700111 HCHG RX REV CODE 636 W/ 250 OVERRIDE (IP): Performed by: ANESTHESIOLOGY

## 2020-07-27 PROCEDURE — 503000 HCHG SUTURE, OHS: Performed by: INTERNAL MEDICINE

## 2020-07-27 PROCEDURE — A9270 NON-COVERED ITEM OR SERVICE: HCPCS | Performed by: NURSE PRACTITIONER

## 2020-07-27 PROCEDURE — 700102 HCHG RX REV CODE 250 W/ 637 OVERRIDE(OP): Performed by: NURSE PRACTITIONER

## 2020-07-27 PROCEDURE — 700101 HCHG RX REV CODE 250: Performed by: INTERNAL MEDICINE

## 2020-07-27 PROCEDURE — 85027 COMPLETE CBC AUTOMATED: CPT

## 2020-07-27 PROCEDURE — C1883 ADAPT/EXT, PACING/NEURO LEAD: HCPCS | Performed by: INTERNAL MEDICINE

## 2020-07-27 PROCEDURE — 500002 HCHG ADHESIVE, DERMABOND: Performed by: INTERNAL MEDICINE

## 2020-07-27 PROCEDURE — 700111 HCHG RX REV CODE 636 W/ 250 OVERRIDE (IP): Performed by: INTERNAL MEDICINE

## 2020-07-27 PROCEDURE — 82962 GLUCOSE BLOOD TEST: CPT | Mod: 91

## 2020-07-27 PROCEDURE — 700101 HCHG RX REV CODE 250: Performed by: ANESTHESIOLOGY

## 2020-07-27 PROCEDURE — 33361 REPLACE AORTIC VALVE PERQ: CPT | Mod: 62,Q0 | Performed by: INTERNAL MEDICINE

## 2020-07-27 PROCEDURE — 160009 HCHG ANES TIME/MIN: Performed by: INTERNAL MEDICINE

## 2020-07-27 PROCEDURE — 700102 HCHG RX REV CODE 250 W/ 637 OVERRIDE(OP): Performed by: INTERNAL MEDICINE

## 2020-07-27 PROCEDURE — 503001 HCHG PERFUSION: Performed by: INTERNAL MEDICINE

## 2020-07-27 PROCEDURE — 33361 REPLACE AORTIC VALVE PERQ: CPT | Mod: 62,Q0 | Performed by: THORACIC SURGERY (CARDIOTHORACIC VASCULAR SURGERY)

## 2020-07-27 PROCEDURE — C1725 CATH, TRANSLUMIN NON-LASER: HCPCS | Performed by: INTERNAL MEDICINE

## 2020-07-27 PROCEDURE — 85347 COAGULATION TIME ACTIVATED: CPT

## 2020-07-27 PROCEDURE — 700105 HCHG RX REV CODE 258: Performed by: ANESTHESIOLOGY

## 2020-07-27 PROCEDURE — 160035 HCHG PACU - 1ST 60 MINS PHASE I: Performed by: INTERNAL MEDICINE

## 2020-07-27 PROCEDURE — 770020 HCHG ROOM/CARE - TELE (206)

## 2020-07-27 PROCEDURE — 160048 HCHG OR STATISTICAL LEVEL 1-5: Performed by: INTERNAL MEDICINE

## 2020-07-27 PROCEDURE — 02RF38Z REPLACEMENT OF AORTIC VALVE WITH ZOOPLASTIC TISSUE, PERCUTANEOUS APPROACH: ICD-10-PCS | Performed by: INTERNAL MEDICINE

## 2020-07-27 PROCEDURE — B24BZZ4 ULTRASONOGRAPHY OF HEART WITH AORTA, TRANSESOPHAGEAL: ICD-10-PCS | Performed by: INTERNAL MEDICINE

## 2020-07-27 PROCEDURE — 160002 HCHG RECOVERY MINUTES (STAT): Performed by: INTERNAL MEDICINE

## 2020-07-27 DEVICE — KIT TRANSCATHETER HEART VALVE  SAPIEN-3 29MM: Type: IMPLANTABLE DEVICE | Site: HEART | Status: FUNCTIONAL

## 2020-07-27 RX ORDER — SODIUM CHLORIDE 9 MG/ML
INJECTION, SOLUTION INTRAVENOUS CONTINUOUS
Status: DISPENSED | OUTPATIENT
Start: 2020-07-27 | End: 2020-07-27

## 2020-07-27 RX ORDER — AMLODIPINE BESYLATE 5 MG/1
5 TABLET ORAL DAILY
Status: DISCONTINUED | OUTPATIENT
Start: 2020-07-28 | End: 2020-07-28 | Stop reason: HOSPADM

## 2020-07-27 RX ORDER — ACETAMINOPHEN 325 MG/1
650 TABLET ORAL EVERY 6 HOURS PRN
Status: DISCONTINUED | OUTPATIENT
Start: 2020-07-27 | End: 2020-07-28 | Stop reason: HOSPADM

## 2020-07-27 RX ORDER — ATORVASTATIN CALCIUM 20 MG/1
20 TABLET, FILM COATED ORAL NIGHTLY
Status: DISCONTINUED | OUTPATIENT
Start: 2020-07-27 | End: 2020-07-28 | Stop reason: HOSPADM

## 2020-07-27 RX ORDER — LIDOCAINE HYDROCHLORIDE 20 MG/ML
INJECTION, SOLUTION EPIDURAL; INFILTRATION; INTRACAUDAL; PERINEURAL PRN
Status: DISCONTINUED | OUTPATIENT
Start: 2020-07-27 | End: 2020-07-27 | Stop reason: SURG

## 2020-07-27 RX ORDER — GABAPENTIN 300 MG/1
300 CAPSULE ORAL 3 TIMES DAILY
Status: DISCONTINUED | OUTPATIENT
Start: 2020-07-27 | End: 2020-07-28 | Stop reason: HOSPADM

## 2020-07-27 RX ORDER — DIPHENHYDRAMINE HCL 25 MG
25 TABLET ORAL NIGHTLY PRN
Status: DISCONTINUED | OUTPATIENT
Start: 2020-07-27 | End: 2020-07-28 | Stop reason: HOSPADM

## 2020-07-27 RX ORDER — DEXTROSE MONOHYDRATE 25 G/50ML
50 INJECTION, SOLUTION INTRAVENOUS
Status: DISCONTINUED | OUTPATIENT
Start: 2020-07-27 | End: 2020-07-28 | Stop reason: HOSPADM

## 2020-07-27 RX ORDER — CLOPIDOGREL BISULFATE 75 MG/1
75 TABLET ORAL DAILY
Status: DISCONTINUED | OUTPATIENT
Start: 2020-07-28 | End: 2020-07-28 | Stop reason: HOSPADM

## 2020-07-27 RX ORDER — OMEPRAZOLE 20 MG/1
40 CAPSULE, DELAYED RELEASE ORAL DAILY
Status: DISCONTINUED | OUTPATIENT
Start: 2020-07-27 | End: 2020-07-28 | Stop reason: HOSPADM

## 2020-07-27 RX ORDER — AMOXICILLIN 250 MG
2 CAPSULE ORAL 2 TIMES DAILY
Status: DISCONTINUED | OUTPATIENT
Start: 2020-07-27 | End: 2020-07-28 | Stop reason: HOSPADM

## 2020-07-27 RX ORDER — HYDROMORPHONE HYDROCHLORIDE 1 MG/ML
0.1 INJECTION, SOLUTION INTRAMUSCULAR; INTRAVENOUS; SUBCUTANEOUS
Status: DISCONTINUED | OUTPATIENT
Start: 2020-07-27 | End: 2020-07-27

## 2020-07-27 RX ORDER — ONDANSETRON 2 MG/ML
4 INJECTION INTRAMUSCULAR; INTRAVENOUS EVERY 4 HOURS PRN
Status: DISCONTINUED | OUTPATIENT
Start: 2020-07-27 | End: 2020-07-28 | Stop reason: HOSPADM

## 2020-07-27 RX ORDER — CLOPIDOGREL BISULFATE 75 MG/1
300 TABLET ORAL ONCE
Status: COMPLETED | OUTPATIENT
Start: 2020-07-27 | End: 2020-07-27

## 2020-07-27 RX ORDER — HYDRALAZINE HYDROCHLORIDE 20 MG/ML
5 INJECTION INTRAMUSCULAR; INTRAVENOUS
Status: DISCONTINUED | OUTPATIENT
Start: 2020-07-27 | End: 2020-07-27 | Stop reason: HOSPADM

## 2020-07-27 RX ORDER — ONDANSETRON 2 MG/ML
4 INJECTION INTRAMUSCULAR; INTRAVENOUS
Status: DISCONTINUED | OUTPATIENT
Start: 2020-07-27 | End: 2020-07-27 | Stop reason: HOSPADM

## 2020-07-27 RX ORDER — DIPHENHYDRAMINE HYDROCHLORIDE 50 MG/ML
25 INJECTION INTRAMUSCULAR; INTRAVENOUS EVERY 6 HOURS PRN
Status: DISCONTINUED | OUTPATIENT
Start: 2020-07-27 | End: 2020-07-28 | Stop reason: HOSPADM

## 2020-07-27 RX ORDER — HALOPERIDOL 5 MG/ML
1 INJECTION INTRAMUSCULAR
Status: DISCONTINUED | OUTPATIENT
Start: 2020-07-27 | End: 2020-07-27 | Stop reason: HOSPADM

## 2020-07-27 RX ORDER — LIDOCAINE HYDROCHLORIDE 20 MG/ML
INJECTION, SOLUTION INFILTRATION; PERINEURAL
Status: DISCONTINUED | OUTPATIENT
Start: 2020-07-27 | End: 2020-07-27 | Stop reason: HOSPADM

## 2020-07-27 RX ORDER — POLYETHYLENE GLYCOL 3350 17 G/17G
1 POWDER, FOR SOLUTION ORAL
Status: DISCONTINUED | OUTPATIENT
Start: 2020-07-27 | End: 2020-07-28 | Stop reason: HOSPADM

## 2020-07-27 RX ORDER — OXYCODONE HCL 5 MG/5 ML
5 SOLUTION, ORAL ORAL
Status: DISCONTINUED | OUTPATIENT
Start: 2020-07-27 | End: 2020-07-27

## 2020-07-27 RX ORDER — HYDROMORPHONE HYDROCHLORIDE 1 MG/ML
0.4 INJECTION, SOLUTION INTRAMUSCULAR; INTRAVENOUS; SUBCUTANEOUS
Status: DISCONTINUED | OUTPATIENT
Start: 2020-07-27 | End: 2020-07-27

## 2020-07-27 RX ORDER — BUPIVACAINE HYDROCHLORIDE 2.5 MG/ML
INJECTION, SOLUTION EPIDURAL; INFILTRATION; INTRACAUDAL
Status: DISCONTINUED | OUTPATIENT
Start: 2020-07-27 | End: 2020-07-27 | Stop reason: HOSPADM

## 2020-07-27 RX ORDER — DEXTROSE MONOHYDRATE 25 G/50ML
50 INJECTION, SOLUTION INTRAVENOUS
Status: DISCONTINUED | OUTPATIENT
Start: 2020-07-27 | End: 2020-07-27 | Stop reason: HOSPADM

## 2020-07-27 RX ORDER — HYDROMORPHONE HYDROCHLORIDE 1 MG/ML
0.2 INJECTION, SOLUTION INTRAMUSCULAR; INTRAVENOUS; SUBCUTANEOUS
Status: DISCONTINUED | OUTPATIENT
Start: 2020-07-27 | End: 2020-07-27

## 2020-07-27 RX ORDER — ONDANSETRON 2 MG/ML
INJECTION INTRAMUSCULAR; INTRAVENOUS PRN
Status: DISCONTINUED | OUTPATIENT
Start: 2020-07-27 | End: 2020-07-27 | Stop reason: SURG

## 2020-07-27 RX ORDER — OXYCODONE HCL 5 MG/5 ML
10 SOLUTION, ORAL ORAL
Status: DISCONTINUED | OUTPATIENT
Start: 2020-07-27 | End: 2020-07-27

## 2020-07-27 RX ORDER — BISACODYL 10 MG
10 SUPPOSITORY, RECTAL RECTAL
Status: DISCONTINUED | OUTPATIENT
Start: 2020-07-27 | End: 2020-07-28 | Stop reason: HOSPADM

## 2020-07-27 RX ORDER — HYDRALAZINE HYDROCHLORIDE 20 MG/ML
10 INJECTION INTRAMUSCULAR; INTRAVENOUS
Status: DISCONTINUED | OUTPATIENT
Start: 2020-07-27 | End: 2020-07-28 | Stop reason: HOSPADM

## 2020-07-27 RX ORDER — SODIUM CHLORIDE, SODIUM LACTATE, POTASSIUM CHLORIDE, CALCIUM CHLORIDE 600; 310; 30; 20 MG/100ML; MG/100ML; MG/100ML; MG/100ML
INJECTION, SOLUTION INTRAVENOUS CONTINUOUS
Status: DISCONTINUED | OUTPATIENT
Start: 2020-07-27 | End: 2020-07-27 | Stop reason: HOSPADM

## 2020-07-27 RX ORDER — CEFAZOLIN SODIUM 1 G/3ML
INJECTION, POWDER, FOR SOLUTION INTRAMUSCULAR; INTRAVENOUS PRN
Status: DISCONTINUED | OUTPATIENT
Start: 2020-07-27 | End: 2020-07-27 | Stop reason: SURG

## 2020-07-27 RX ORDER — PROTAMINE SULFATE 10 MG/ML
INJECTION, SOLUTION INTRAVENOUS PRN
Status: DISCONTINUED | OUTPATIENT
Start: 2020-07-27 | End: 2020-07-27 | Stop reason: SURG

## 2020-07-27 RX ORDER — SODIUM CHLORIDE, SODIUM LACTATE, POTASSIUM CHLORIDE, CALCIUM CHLORIDE 600; 310; 30; 20 MG/100ML; MG/100ML; MG/100ML; MG/100ML
INJECTION, SOLUTION INTRAVENOUS CONTINUOUS
Status: ACTIVE | OUTPATIENT
Start: 2020-07-27 | End: 2020-07-27

## 2020-07-27 RX ORDER — VERAPAMIL HYDROCHLORIDE 2.5 MG/ML
INJECTION, SOLUTION INTRAVENOUS
Status: DISPENSED
Start: 2020-07-27 | End: 2020-07-27

## 2020-07-27 RX ORDER — MEPERIDINE HYDROCHLORIDE 25 MG/ML
12.5 INJECTION INTRAMUSCULAR; INTRAVENOUS; SUBCUTANEOUS
Status: DISCONTINUED | OUTPATIENT
Start: 2020-07-27 | End: 2020-07-27 | Stop reason: HOSPADM

## 2020-07-27 RX ADMIN — OMEPRAZOLE 40 MG: 20 CAPSULE, DELAYED RELEASE ORAL at 14:19

## 2020-07-27 RX ADMIN — PROPOFOL 150 MCG/KG/MIN: 10 INJECTION, EMULSION INTRAVENOUS at 09:30

## 2020-07-27 RX ADMIN — PROPOFOL 100 MG: 10 INJECTION, EMULSION INTRAVENOUS at 09:30

## 2020-07-27 RX ADMIN — GABAPENTIN 300 MG: 300 CAPSULE ORAL at 14:19

## 2020-07-27 RX ADMIN — CLOPIDOGREL BISULFATE 300 MG: 75 TABLET ORAL at 20:30

## 2020-07-27 RX ADMIN — REMIFENTANIL HYDROCHLORIDE 0.1 MCG/KG/MIN: 1 INJECTION, POWDER, LYOPHILIZED, FOR SOLUTION INTRAVENOUS at 09:30

## 2020-07-27 RX ADMIN — ONDANSETRON 4 MG: 2 INJECTION INTRAMUSCULAR; INTRAVENOUS at 10:40

## 2020-07-27 RX ADMIN — MEPERIDINE HYDROCHLORIDE 12.5 MG: 25 INJECTION INTRAMUSCULAR; INTRAVENOUS; SUBCUTANEOUS at 11:40

## 2020-07-27 RX ADMIN — MEPERIDINE HYDROCHLORIDE 12.5 MG: 25 INJECTION INTRAMUSCULAR; INTRAVENOUS; SUBCUTANEOUS at 11:05

## 2020-07-27 RX ADMIN — LIDOCAINE HYDROCHLORIDE 40 MG: 20 INJECTION, SOLUTION EPIDURAL; INFILTRATION; INTRACAUDAL at 09:30

## 2020-07-27 RX ADMIN — SODIUM CHLORIDE: 9 INJECTION, SOLUTION INTRAVENOUS at 14:21

## 2020-07-27 RX ADMIN — DOCUSATE SODIUM 50 MG AND SENNOSIDES 8.6 MG 2 TABLET: 8.6; 5 TABLET, FILM COATED ORAL at 17:42

## 2020-07-27 RX ADMIN — LIDOCAINE HYDROCHLORIDE 0.5 ML: 10 INJECTION, SOLUTION EPIDURAL; INFILTRATION; INTRACAUDAL at 07:54

## 2020-07-27 RX ADMIN — CEFAZOLIN 2 G: 330 INJECTION, POWDER, FOR SOLUTION INTRAMUSCULAR; INTRAVENOUS at 09:30

## 2020-07-27 RX ADMIN — ATORVASTATIN CALCIUM 20 MG: 20 TABLET, FILM COATED ORAL at 17:43

## 2020-07-27 RX ADMIN — PROTAMINE SULFATE 100 MG: 10 INJECTION, SOLUTION INTRAVENOUS at 10:40

## 2020-07-27 RX ADMIN — ASPIRIN 81 MG: 81 TABLET, COATED ORAL at 14:20

## 2020-07-27 RX ADMIN — SODIUM CHLORIDE, POTASSIUM CHLORIDE, SODIUM LACTATE AND CALCIUM CHLORIDE: 600; 310; 30; 20 INJECTION, SOLUTION INTRAVENOUS at 07:56

## 2020-07-27 RX ADMIN — POVIDONE-IODINE 15 ML: 10 SOLUTION TOPICAL at 07:56

## 2020-07-27 SDOH — HEALTH STABILITY: MENTAL HEALTH: HOW MANY STANDARD DRINKS CONTAINING ALCOHOL DO YOU HAVE ON A TYPICAL DAY?: 3 OR 4

## 2020-07-27 SDOH — HEALTH STABILITY: MENTAL HEALTH: HOW OFTEN DO YOU HAVE 6 OR MORE DRINKS ON ONE OCCASION?: NEVER

## 2020-07-27 SDOH — HEALTH STABILITY: MENTAL HEALTH: HOW OFTEN DO YOU HAVE A DRINK CONTAINING ALCOHOL?: 4 OR MORE TIMES A WEEK

## 2020-07-27 ASSESSMENT — COGNITIVE AND FUNCTIONAL STATUS - GENERAL
SUGGESTED CMS G CODE MODIFIER MOBILITY: CK
WALKING IN HOSPITAL ROOM: A LITTLE
MOBILITY SCORE: 18
MOVING FROM LYING ON BACK TO SITTING ON SIDE OF FLAT BED: A LITTLE
TURNING FROM BACK TO SIDE WHILE IN FLAT BAD: A LITTLE
MOVING TO AND FROM BED TO CHAIR: A LITTLE
TOILETING: A LITTLE
CLIMB 3 TO 5 STEPS WITH RAILING: A LITTLE
PERSONAL GROOMING: A LITTLE
DRESSING REGULAR UPPER BODY CLOTHING: A LITTLE
HELP NEEDED FOR BATHING: A LITTLE
EATING MEALS: A LITTLE
STANDING UP FROM CHAIR USING ARMS: A LITTLE
SUGGESTED CMS G CODE MODIFIER DAILY ACTIVITY: CK
DAILY ACTIVITIY SCORE: 18
DRESSING REGULAR LOWER BODY CLOTHING: A LITTLE

## 2020-07-27 ASSESSMENT — PATIENT HEALTH QUESTIONNAIRE - PHQ9
SUM OF ALL RESPONSES TO PHQ9 QUESTIONS 1 AND 2: 0
1. LITTLE INTEREST OR PLEASURE IN DOING THINGS: NOT AT ALL
2. FEELING DOWN, DEPRESSED, IRRITABLE, OR HOPELESS: NOT AT ALL

## 2020-07-27 ASSESSMENT — LIFESTYLE VARIABLES
TOTAL SCORE: 0
HOW MANY TIMES IN THE PAST YEAR HAVE YOU HAD 5 OR MORE DRINKS IN A DAY: 0
DOES PATIENT WANT TO STOP DRINKING: NO
HAVE YOU EVER FELT YOU SHOULD CUT DOWN ON YOUR DRINKING: NO
HAVE PEOPLE ANNOYED YOU BY CRITICIZING YOUR DRINKING: NO
TOTAL SCORE: 0
EVER HAD A DRINK FIRST THING IN THE MORNING TO STEADY YOUR NERVES TO GET RID OF A HANGOVER: NO
ON A TYPICAL DAY WHEN YOU DRINK ALCOHOL HOW MANY DRINKS DO YOU HAVE: 3
EVER_SMOKED: YES
EVER FELT BAD OR GUILTY ABOUT YOUR DRINKING: NO
CONSUMPTION TOTAL: POSITIVE
ALCOHOL_USE: YES
TOTAL SCORE: 0
AVERAGE NUMBER OF DAYS PER WEEK YOU HAVE A DRINK CONTAINING ALCOHOL: 6

## 2020-07-27 ASSESSMENT — COPD QUESTIONNAIRES
IN THE PAST 12 MONTHS DO YOU DO LESS THAN YOU USED TO BECAUSE OF YOUR BREATHING PROBLEMS: DISAGREE/UNSURE
HAVE YOU SMOKED AT LEAST 100 CIGARETTES IN YOUR ENTIRE LIFE: NO/DON'T KNOW
DURING THE PAST 4 WEEKS HOW MUCH DID YOU FEEL SHORT OF BREATH: NONE/LITTLE OF THE TIME
DO YOU EVER COUGH UP ANY MUCUS OR PHLEGM?: NO/ONLY WITH OCCASIONAL COLDS OR INFECTIONS
COPD SCREENING SCORE: 2

## 2020-07-27 ASSESSMENT — FIBROSIS 4 INDEX
FIB4 SCORE: 1.72
FIB4 SCORE: 1.35

## 2020-07-27 NOTE — OR SURGEON
Operative Report    PreOp Diagnosis: Severe symptomatic aortic stenosis    PostOp Diagnosis: Same as above    Procedure(s):  REPLACEMENT, AORTIC VALVE, TRANSCATHETER -29 mm Live S3 valve- Wound Class: Clean  ECHOCARDIOGRAM, TRANSTHORACIC - Wound Class: None    Surgeon(s):  LEA Salas D.O. Athan Roumanas, MD (surgical [facility])    Anesthesiologist/Type of Anesthesia:  Anesthesiologist: Vj Man M.D./Sedation    Surgical Staff:  Circulator: Iza Schaefer R.N.; Carlos Spaulding R.N.  Perfusionist: Manny Lamb  Relief Circulator: Edwar Massey R.N.  Scrub Person: Eric Davison    Specimens removed if any:  * No specimens in log *    Estimated Blood Loss: Less than 50 mL    Findings: Post procedure echo revealed appropriate gradient, appropriate positioning in the annulus, no perivalvular leak    Complications: None immediate    Procedure:     Procedure: After informed consent was obtained, the patient was brought to the operating room.  He was placed in supine position on the operating table.  All bony prominences padded and joints placed in neutral position.  He received preoperative antibiotics.  Conscious sedation was commenced by the anesthesia team after placing all lines and monitoring catheters etc.  Trans-thoracic echocardiography was provided by our anesthesiologist.  Timeout was performed.    The procedure was begun by obtaining primary access in the left common femoral.  The patient has AV fistula from previous catheterization in the right.  Secondary arterial access was gained in the right radial artery.  Each of these were obtained using a Seldinger's technique under ultrasound guidance. 6 Moroccan sheath was inserted.  Access was obtained in the left femoral vein in like manner.  Abdominal aortogram was performed with bilateral iliofemoral runoff.  Lunderquist wire was placed in the left sheath and 2 Perclose sutures were placed in standard fashion there.   Through the a right internal jugular vein access, temporary pacing wire was advanced to the RV apex in usual fashion.  Through the right radial artery, pigtail catheter was advanced to the aortic root where a root shot was performed.    .  The left femoral artery was then subsequently serially dilated up to 16 Estonian size.  The Ramirez 16 Estonian sheath was then placed and secured with a Ethibond stitch.  Through the Ramirez sheath, an AL-1 catheter was advanced to the aortic root.  Straight wire was used to cross the aortic valve.  A stiff Amplatz wire was then positioned the LV apex and the AL-1 removed.  The 29 mm Ramirez Safian 3 valve was prepped in standard fashion, orientation confirmed, and then advanced over the stiff wire into the aortic position.  Rapid pacing was performed, aortic root angiogram shot, and after checking position of the valve, it was deployed under fluoroscopy.  Valve was deployed with 33 mL's of volume at a pressure of 6 hai. Postprocedure echo revealed no significant leak and good positioning with appropriate gradients.    We then proceeded with hemostasis and terminating the procedure.  The left sheath was removed and the Perclose stitches secured.  The right radial device was treated with a TR band.  Pressure was held on all sites.  All pacing wires and catheters were removed.  The patient tolerated the procedure well, was taken to the PACU in stable condition.          7/27/2020 10:43 AM Marvin Salcedo D.O.

## 2020-07-27 NOTE — OR NURSING
1240-3 ml removed from TR band, then removed. Site CDI.    1245-Right groin puncture site has small amt red drainage.  1250-Right groin puncture site has small amt red drainage.  1300-Right groin puncture site has small amt red drainage.This drainage continues and is very minute.Drops on a 2x2.

## 2020-07-27 NOTE — ANESTHESIA PREPROCEDURE EVALUATION
Sever Aortic Stenosis  CLARA, CPAP Compliant  Obesity  NIDDM  Aortic Aneurysm  HTN  HLD      Physical Exam    Airway   Mallampati: II  TM distance: <3 FB  Neck ROM: full       Cardiovascular - normal exam  Rate: normal  (+) murmur     Dental - normal exam           Pulmonary - normal exam  Breath sounds clear to auscultation     Abdominal    Neurological - normal exam                 Anesthesia Plan    ASA 4   ASA physical status 4 criteria: severe valve dysfunction    Plan - MAC             Induction: intravenous      Pertinent diagnostic labs and testing reviewed    Informed Consent:    Anesthetic plan and risks discussed with patient and spouse.    Use of blood products discussed with: patient and spouse whom.

## 2020-07-27 NOTE — OP REPORT
DATE OF PROCEDURE: 7/27/2020    REFERRING PHYSICIAN:     PROCEDURES performed:  1. Transcatheter aortic valve replacement.  2. Insertion and removal of temporary pacer wire    PRE PROCEDURAL DIAGNOSIS:  1. Severe symptomatic aortic stenosis, NYHA II.    Operators:  CT Surgeon:    Interventional cardiologist: Dr. Fontanez    DESCRIPTION OF PROCEDURE:  After informed consent was signed by the   patient, the patient was brought into the operating room, was prepped and draped in   usual sterile manner.    conscious sedation and   transthoracicechocardiogram was provided by .    Primary Arterial access:   left femoral artery was cannulated using modified Seldinger technique under ultrasound guidance, a 6 Kyrgyz sheath was inserted.  Abdominal aortogram with bilateral iliofemoral runoff was performed by placing pigtail in descending abdominal aorta and confirmed common femoral arterial access. 2 Perclose sutures were placed, arteriotomy was serially dilated,16 Fr sheath was inserted in the femoral artery over a stiff Lunderquist wire by   .    Secondary arterial access:   right radial artery was cannulated with 6 Kyrgyz sheath.  A pigtail catheter was advanced through 6 Kyrgyz sheath, aortic root angiogram was performed to determine coplanar view.  Right femoral artery was not used because of AV fistula.    Venous Access:   right internal jugular vein was cannulated with 6 Kyrgyz sheath, a temporary pacer wire was advanced to RV apex in usual fashion by Dr. Man.    Through Ramirez self-expandable sheath, AL-1 catheter was advanced to aortic root, straight wire was used to cross the aortic valve, a stiff Amplatz wire was placed in the LV apex and AL-1 catheter was withdrawn. 29 mm Ramirez Live 3 smita was prepped in usual fashion, orientation was confirmed.  Sapient 3 valve was slowly advanced over a stiff Amplatz wire to aortic position.  Rapid pacing was achieved using  temporary pacer wire, aortic root angiogram was performed and after confirming good positioning, valve was slowly deployed under fluoroscopic guidance.  Post procedure echocardiogram showed no significant paravalvular leak, good valve positioning.    The patient tolerated the procedure well. At the end of procedure, all pacemaker wire,   pigtail catheters and sheaths were removed.  The primary arterial access site was closed with the PreClose system.  The secondary arterial access was closed via Angio-Seal. The patient was then extubated and transferred to PACU in stable condition.    Complications: none  Specimens: none  Estimated blood loss: <50cc      IMPRESSION:  Successful transcatheter aortic valve replacement using 29 mm Ramirez ashley 3 valve    RECOMMENDATION:   Guideline directed medical therapy.    Rubén Fontanez  Interventional cardiologist

## 2020-07-27 NOTE — ANESTHESIA TIME REPORT
Anesthesia Start and Stop Event Times     Date Time Event    7/27/2020 0848 Ready for Procedure     0923 Anesthesia Start     1052 Anesthesia Stop        Responsible Staff  07/27/20    Name Role Begin End    Vj Man M.D. Anesth 0923 1052        Preop Diagnosis (Free Text):  Pre-op Diagnosis     SEVERE AORTIC STENOSIS        Preop Diagnosis (Codes):    Post op Diagnosis  Severe aortic stenosis      Premium Reason  Non-Premium    Comments:

## 2020-07-27 NOTE — ANESTHESIA PROCEDURE NOTES
Arterial Line  Performed by: Vj Man M.D.  Authorized by: Vj Man M.D.     Start Time:  7/27/2020 9:34 AM  End Time:  7/27/2020 9:36 AM  Localization: ultrasound guidance  Image captured, interpreted and electronically stored.    Patient Location:  OR  Indication: continuous blood pressure monitoring and blood sampling needed        Catheter Size:  20 G  Seldinger Technique?: Yes    Laterality:  Left  Site:  Radial artery  Line Secured:  Antimicrobial disc, tape and transparent dressing  Events: patient tolerated procedure well with no complications

## 2020-07-27 NOTE — OR NURSING
1050-The left groin puncture site, derma bonded is CDI, soft to  touch, no discoloration. The left anterior dorsalis pedis pulse is easily palpated.   The right radial TR band is intact,.    1105-The left groin puncture site, derma bonded is CDI, soft to  touch, no discoloration. The left anterior dorsalis pedis pulse is easily palpated.   The right radial TR band is intact,.    1120-The left groin puncture site, derma bonded is CDI, soft to  touch, no discoloration. The left anterior dorsalis pedis pulse is easily palpated.   The right radial TR band is intact.      1135-The left groin puncture site, derma bonded is CDI, soft to  touch, no discoloration. The left anterior dorsalis pedis pulse is easily palpated.   The right radial TR band is intact.    1150-The left groin puncture site, derma bonded is CDI, soft to  touch, no discoloration. The left anterior dorsalis pedis pulse is easily palpated.   The right radial TR band is intact.

## 2020-07-27 NOTE — OR NURSING
1213-3 ml air released from TR band.     1215- The left groin puncture site is CDI, soft to touch, no hematoma. The left anterior dorsalis pedis pulse is easily palpable.

## 2020-07-27 NOTE — TELEPHONE ENCOUNTER
----- Message from SUSI Mclean sent at 7/27/2020 11:04 AM PDT -----  Morning heidi,    Can we please get appt for biotel placement tomorrow mid day to afternoon. Thank you, Linda

## 2020-07-27 NOTE — OR NURSING
Demerol IV given for post-op shivering. Shivering subsided after minutes of demerol administration    EKG Portable chest X-Ray done. Labs sent for CBC BMP and BYTPE naturietic peptide.

## 2020-07-27 NOTE — OR NURSING
1140-Demerol IV given for post-op shivering. Shivering subsided after minutes of demerol administration

## 2020-07-27 NOTE — ANESTHESIA QCDR
2019 [facility] Clinical Data Registry (for Quality Improvement)     Postoperative nausea/vomiting risk protocol (Adult = 18 yrs and Pediatric 3-17 yrs)- (430 and 463)  General inhalation anesthetic (NOT TIVA) with PONV risk factors: No  Provision of anti-emetic therapy with at least 2 different classes of agents: N/A  Patient DID NOT receive anti-emetic therapy and reason is documented in Medical Record: N/A    Multimodal Pain Management- (477)  Non-emergent surgery AND patient age >= 18: No  Use of Multimodal Pain Management, two or more drugs and/or interventions, NOT including systemic opioids:   Exception: Documented allergy to multiple classes of analgesics:     Smoking Abstinence (404)  Patient is current smoker (cigarette, pipe, e-cig, marijuanna): No  Elective Surgery:   Abstinence instructions provided prior to day of surgery:   Patient abstained from smoking on day of surgery:     Pre-Op Beta-Blocker in Isolated CABG (44)  Isolated CABG AND patient age >= 18: No  Beta-blocker admin within 24 hours of surgical incision:   Exception:of medical reason(s) for not administering beta blocker within 24 hours prior to surgical incision (e.g., not  indicated,other medical reason):     PACU assessment of acute postoperative pain prior to Anesthesia Care End- Applies to Patients Age = 18- (ABG7)  Initial PACU pain score is which of the following: < 7/10  Patient unable to report pain score: N/A    Post-anesthetic transfer of care checklist/protocol to PACU/ICU- (426 and 427)  Upon conclusion of case, patient transferred to which of the following locations: PACU/Non-ICU  Use of transfer checklist/protocol: Yes  Exclusion: Service Performed in Patient Hospital Room (and thus did not require transfer): N/A  Unplanned admission to ICU related to anesthesia service up through end of PACU care- (MD51)  Unplanned admission to ICU (not initially anticipated at anesthesia start time): No

## 2020-07-27 NOTE — ANESTHESIA POSTPROCEDURE EVALUATION
Patient: Jamil Kendall    Procedure Summary     Date:  07/27/20 Room / Location:  [place] [address] / SURGERY [facility]    Anesthesia Start:  0923 Anesthesia Stop:  1052    Procedures:       REPLACEMENT, AORTIC VALVE, TRANSCATHETER - INCLUDING PERMANENT PACEMAKER PLACEMENT AND CEREBRAL PROTECTION DEVICE (Left Groin)      ECHOCARDIOGRAM, TRANSTHORACIC (Chest) Diagnosis:  (SEVERE AORTIC STENOSIS)    Surgeon:  Rubén Fontanez M.D. Responsible Provider:  Vj Man M.D.    Anesthesia Type:  MAC ASA Status:  4          Final Anesthesia Type: MAC  Last vitals  BP   Blood Pressure : 129/66, Arterial BP: 151/63    Temp   35.9 °C (96.6 °F)    Pulse   Pulse: 81   Resp   12    SpO2   95 %      Anesthesia Post Evaluation    Patient location during evaluation: PACU  Patient participation: complete - patient participated  Level of consciousness: awake and alert    Airway patency: patent  Anesthetic complications: no  Cardiovascular status: hemodynamically stable  Respiratory status: acceptable  Hydration status: euvolemic    PONV: none           Nurse Pain Score: 0 (NPRS)

## 2020-07-27 NOTE — ANESTHESIA PROCEDURE NOTES
Central Venous Line  Performed by: Vj Man M.D.  Authorized by: Vj Man M.D.     Start Time:  7/27/2020 9:42 AM  End Time:  7/27/2020 9:48 AM  Patient Location:  OR  Indication: central venous access and cardiac pacing        provider hand hygiene performed prior to central venous catheter insertion, all 5 sterile barriers used (gloves, gown, cap, mask, large sterile drape) during central venous catheter insertion and skin prep agent completely dried prior to procedure    Patient Position:  Trendelenburg  Laterality:  Right  Site:  Internal jugular  Prep:  Chlorhexidine  Catheter Size:  6 Fr  Catheter Length (cm):  10  Catheter Type:  Introducer  Number of Lumens:  Single lumen  target vein identified, needle advanced into vein and blood aspirated and guidewire advanced into vein    Seldinger Technique?: Yes    Ultrasound-Guided: ultrasound-guided  Image captured, interpreted and electronically stored.  Sterile Gel and Probe Cover Used for Ultrasound?: Yes    Intravenous Verification: verified by ultrasound, venous blood return, verified by x-ray and chest x-ray pending    all ports aspirated, all ports flushed easily, guidewire was removed intact, biopatch was applied, line was sutured in place and dressing was applied    Events: patient tolerated procedure well with no complications    PA Catheter Placed?: No     Pacer wire advanced to 35 cm.  Positive capture down to 0.3 mA.  Line placements verified by Fluoro.

## 2020-07-27 NOTE — OR NURSING
3 ml released from TR band.    Belongings taken from locker and placed on gurney with pt. 1-bag 1-CPAP 1-cane 1-camo duffle bag.    Wife - no answer on phone. Not in waiting room.

## 2020-07-28 ENCOUNTER — APPOINTMENT (OUTPATIENT)
Dept: CARDIOLOGY | Facility: MEDICAL CENTER | Age: 85
DRG: 267 | End: 2020-07-28
Attending: NURSE PRACTITIONER
Payer: COMMERCIAL

## 2020-07-28 ENCOUNTER — TELEPHONE (OUTPATIENT)
Dept: CARDIOLOGY | Facility: MEDICAL CENTER | Age: 85
End: 2020-07-28

## 2020-07-28 ENCOUNTER — NON-PROVIDER VISIT (OUTPATIENT)
Dept: CARDIOLOGY | Facility: MEDICAL CENTER | Age: 85
End: 2020-07-28
Payer: COMMERCIAL

## 2020-07-28 ENCOUNTER — APPOINTMENT (OUTPATIENT)
Dept: RADIOLOGY | Facility: MEDICAL CENTER | Age: 85
DRG: 267 | End: 2020-07-28
Attending: NURSE PRACTITIONER
Payer: COMMERCIAL

## 2020-07-28 VITALS
SYSTOLIC BLOOD PRESSURE: 126 MMHG | TEMPERATURE: 98.3 F | HEART RATE: 78 BPM | HEIGHT: 69 IN | DIASTOLIC BLOOD PRESSURE: 60 MMHG | RESPIRATION RATE: 18 BRPM | BODY MASS INDEX: 32.07 KG/M2 | OXYGEN SATURATION: 93 % | WEIGHT: 216.49 LBS

## 2020-07-28 DIAGNOSIS — Z95.2 S/P TAVR (TRANSCATHETER AORTIC VALVE REPLACEMENT): ICD-10-CM

## 2020-07-28 DIAGNOSIS — I49.1 PAC (PREMATURE ATRIAL CONTRACTION): ICD-10-CM

## 2020-07-28 DIAGNOSIS — I44.7 LBBB (LEFT BUNDLE BRANCH BLOCK): ICD-10-CM

## 2020-07-28 LAB
ALBUMIN SERPL BCP-MCNC: 3.8 G/DL (ref 3.2–4.9)
ALBUMIN/GLOB SERPL: 1.3 G/DL
ALP SERPL-CCNC: 56 U/L (ref 30–99)
ALT SERPL-CCNC: 26 U/L (ref 2–50)
ANION GAP SERPL CALC-SCNC: 12 MMOL/L (ref 7–16)
AST SERPL-CCNC: 21 U/L (ref 12–45)
BILIRUB SERPL-MCNC: 0.5 MG/DL (ref 0.1–1.5)
BUN SERPL-MCNC: 8 MG/DL (ref 8–22)
CALCIUM SERPL-MCNC: 9.1 MG/DL (ref 8.5–10.5)
CHLORIDE SERPL-SCNC: 97 MMOL/L (ref 96–112)
CO2 SERPL-SCNC: 23 MMOL/L (ref 20–33)
CREAT SERPL-MCNC: 0.57 MG/DL (ref 0.5–1.4)
EKG IMPRESSION: NORMAL
ERYTHROCYTE [DISTWIDTH] IN BLOOD BY AUTOMATED COUNT: 50.3 FL (ref 35.9–50)
GLOBULIN SER CALC-MCNC: 2.9 G/DL (ref 1.9–3.5)
GLUCOSE BLD-MCNC: 99 MG/DL (ref 65–99)
GLUCOSE BLD-MCNC: 99 MG/DL (ref 65–99)
GLUCOSE SERPL-MCNC: 101 MG/DL (ref 65–99)
HCT VFR BLD AUTO: 37.4 % (ref 42–52)
HGB BLD-MCNC: 11.7 G/DL (ref 14–18)
LV EJECT FRACT  99904: 65
LV EJECT FRACT MOD 2C 99903: 69.26
LV EJECT FRACT MOD 4C 99902: 57.22
LV EJECT FRACT MOD BP 99901: 64.57
MCH RBC QN AUTO: 26.4 PG (ref 27–33)
MCHC RBC AUTO-ENTMCNC: 31.3 G/DL (ref 33.7–35.3)
MCV RBC AUTO: 84.2 FL (ref 81.4–97.8)
NT-PROBNP SERPL IA-MCNC: 67 PG/ML (ref 0–125)
PLATELET # BLD AUTO: 205 K/UL (ref 164–446)
PMV BLD AUTO: 8.6 FL (ref 9–12.9)
POTASSIUM SERPL-SCNC: 4.2 MMOL/L (ref 3.6–5.5)
PROT SERPL-MCNC: 6.7 G/DL (ref 6–8.2)
RBC # BLD AUTO: 4.44 M/UL (ref 4.7–6.1)
SODIUM SERPL-SCNC: 132 MMOL/L (ref 135–145)
WBC # BLD AUTO: 11.8 K/UL (ref 4.8–10.8)

## 2020-07-28 PROCEDURE — 700102 HCHG RX REV CODE 250 W/ 637 OVERRIDE(OP): Performed by: NURSE PRACTITIONER

## 2020-07-28 PROCEDURE — 80053 COMPREHEN METABOLIC PANEL: CPT

## 2020-07-28 PROCEDURE — 85027 COMPLETE CBC AUTOMATED: CPT

## 2020-07-28 PROCEDURE — A9270 NON-COVERED ITEM OR SERVICE: HCPCS | Performed by: NURSE PRACTITIONER

## 2020-07-28 PROCEDURE — 36415 COLL VENOUS BLD VENIPUNCTURE: CPT

## 2020-07-28 PROCEDURE — 71045 X-RAY EXAM CHEST 1 VIEW: CPT

## 2020-07-28 PROCEDURE — 93010 ELECTROCARDIOGRAM REPORT: CPT | Performed by: INTERNAL MEDICINE

## 2020-07-28 PROCEDURE — 83880 ASSAY OF NATRIURETIC PEPTIDE: CPT

## 2020-07-28 PROCEDURE — 93268 ECG RECORD/REVIEW: CPT | Performed by: INTERNAL MEDICINE

## 2020-07-28 PROCEDURE — 93306 TTE W/DOPPLER COMPLETE: CPT

## 2020-07-28 PROCEDURE — 93005 ELECTROCARDIOGRAM TRACING: CPT | Performed by: NURSE PRACTITIONER

## 2020-07-28 PROCEDURE — 82962 GLUCOSE BLOOD TEST: CPT | Mod: 91

## 2020-07-28 PROCEDURE — 93306 TTE W/DOPPLER COMPLETE: CPT | Mod: 26 | Performed by: INTERNAL MEDICINE

## 2020-07-28 RX ORDER — CLOPIDOGREL BISULFATE 75 MG/1
75 TABLET ORAL DAILY
Qty: 90 TAB | Refills: 0 | Status: SHIPPED | OUTPATIENT
Start: 2020-07-28 | End: 2020-07-30

## 2020-07-28 RX ORDER — CLOPIDOGREL BISULFATE 75 MG/1
75 TABLET ORAL DAILY
Qty: 90 TAB | Refills: 0 | Status: SHIPPED | OUTPATIENT
Start: 2020-07-29 | End: 2020-09-02

## 2020-07-28 RX ORDER — ASPIRIN 81 MG/1
81 TABLET ORAL DAILY
Qty: 30 TAB | Refills: 8 | Status: SHIPPED | OUTPATIENT
Start: 2020-07-29

## 2020-07-28 RX ORDER — METOPROLOL SUCCINATE 25 MG/1
25 TABLET, EXTENDED RELEASE ORAL
Qty: 30 TAB | COMMUNITY
Start: 2020-07-30

## 2020-07-28 RX ADMIN — AMLODIPINE BESYLATE 5 MG: 5 TABLET ORAL at 06:13

## 2020-07-28 RX ADMIN — OMEPRAZOLE 40 MG: 20 CAPSULE, DELAYED RELEASE ORAL at 06:13

## 2020-07-28 RX ADMIN — ASPIRIN 81 MG: 81 TABLET, COATED ORAL at 06:13

## 2020-07-28 RX ADMIN — CLOPIDOGREL BISULFATE 75 MG: 75 TABLET ORAL at 06:13

## 2020-07-28 RX ADMIN — DOCUSATE SODIUM 50 MG AND SENNOSIDES 8.6 MG 2 TABLET: 8.6; 5 TABLET, FILM COATED ORAL at 06:14

## 2020-07-28 NOTE — DISCHARGE SUMMARY
PRIMARY DISCHARGE DIAGNOSIS: Status post transcatheter aortic valve replacement.    DISCHARGE DIAGNOSIS:  1. Severe symptomatic aortic stenosis: s/p TAVR  2. COPD   3. Hypertension  4. Hyperlipidemia  5. Skin cancer  6. Colostomy   7. Sleep apnea on cpap.     PROCEDURES:    1. Successful transcatheter aortic valve replacement (TAVR) with #29 Ramirez Live 3 ultra valve, transfemoral approach under conscious sedation on 7/28/2020.   2. Intraoperative transesophageal echocardiogram showing Intraoperative TTE during TAVR.  Baseline images show normal   biventricular systolic function with EF = 55%.  Severe aortic stenosis.    Post deployment images show preserved biventricular function. TAVR   valve seen functioning normally with normal leaflet motion, no   paravalvular leak, mean gradient of 7 mm Hg and calculated effective   orifice area of 2.7 cm2.  Findings communicated at the time of exam.  3. Echocardiogram on 7/28/2020 showing Compared to the prior echo of 07/17/20, the aortic valve has been   replaced  Ascending aorta is dilated with a diameter of  4.5 cm.  Known TAVR aortic valve that is functioning normally with normal   transvalvular gradients.  Transvalvular gradients are - Peak: 18 mmHg,  Mean: 10 mmHg.  Mild paravalvular leak is noted.  Normal left ventricular systolic function.  Left ventricular ejection fraction is visually estimated to be 65%.  Normal regional wall motion.  Mild concentric left ventricular hypertrophy.  Normal left atrial size.  No mitral regurgitation.  4. CXR on 7/28/2020 showing Bilateral basilar atelectasis, no focal infiltrate  5. EKG on 7/28/2020 showing NSR with left fascicular block     HOSPITAL COURSE: The patient is a pleasant 85 year old male with severe symptomatic aortic stenosis, COPD FEV1, hypertension, hyperlipidemia, skin cancer, colostomy and sleep apnea on cpap. Due to the patient's symptoms, the patient underwent successful TAVR described as above. Post-procedure,  the patient did well. proBNP was 64. They did not require IV diuresis during their stay. They were able to ambulate without difficulty. No further events were noted during their stay. They are now off oxygen and are to be discharged to home. biotel placed after hospital discharged.    DISCHARGE MEDICATIONS: HOLD METFORMIN AND METOPROLOL UNTIL Thursday, PLAVIX for 90 days ONLY      Jamil Kendall   Home Medication Instructions STACI:[id number]    Printed on:07/28/20 2288   Medication Information                      amLODIPine (NORVASC) 5 MG Tab  Take 5 mg by mouth every day.             aspirin EC 81 MG EC tablet  Take 1 Tab by mouth every day.             atorvastatin (LIPITOR) 20 MG Tab  Take 20 mg by mouth every evening.             clopidogrel (PLAVIX) 75 MG Tab  Take 1 Tab by mouth every day.             clopidogrel (PLAVIX) 75 MG Tab  Take 1 Tab by mouth every day.             gabapentin (NEURONTIN) 100 MG Cap  Take 300 mg by mouth 3 times a day.             metFORMIN (GLUCOPHAGE) 500 MG Tab  Take 1 Tab by mouth 2 times a day, with meals.             metoprolol SR (TOPROL XL) 25 MG TABLET SR 24 HR  Take 1 Tab by mouth.             OMEPRAZOLE PO  Take 40 mg by mouth.                   DISCHARGE INSTRUCTIONS: They are given discharge instructions on potential post-operative complications and symptoms to watch out for. Their groin sites were checked and were clean, dry, and intact. Patient or family to notify us for any complications noted on the discharge instructions. They will follow up with myself, Linda BULL, on thursday in our cardiology office. They will then follow up with Dr. Fontanez with a repeat echocardiogram in one month for post TAVR assessment.      Future Appointments   Date Time Provider Department Center   7/30/2020  3:20 PM SUSI Mclean [facility] None

## 2020-07-28 NOTE — DISCHARGE PLANNING
"Hospital Care Management Discharge Planning       Anticipated Discharge Disposition:   · TBD     Action:   · Per chart review, patient is post op day #1 S/P TAVR.   · PT order placed for evaluation on post op day #2.      Barriers to Discharge:   · Medical Clearance.   · PT recommendations.      Plan:   · Patient discharge plan currently unknown; pending medical clearance and PT recommendations.    · Hospital Care Management Team to continue to provide support services and assistance with discharge planning as needed.       Current Expected Day of Discharge: 7/31/2020      For further assistance please contact the assigned RN Case Manager or  at the extension listed under \"Treatment Teams\".      "

## 2020-07-28 NOTE — PROGRESS NOTES
Bedside report received from BUZZ Boateng.  Assessed left groin site and right wrist site, with night shift RN. Both sites soft to touch. Pt has no complaints of pain at this time. POC discussed with pt; all questions answered at this time.Call light within reach, bed locked in lowest position.

## 2020-07-28 NOTE — TELEPHONE ENCOUNTER
Patient enrolled in the 14 day [employer] Heart monitoring program per BIANCA Mclean.  >In office hookup with Baseline transmitted.  >Pending EOS.

## 2020-07-28 NOTE — PROGRESS NOTES
Pt dc'd to appointment. IV and monitor removed; monitor room notified. Pt left unit via wheelchair with BUZZ Fine. Personal belongings with pt when leaving unit. Pt given discharge instructions prior to leaving unit including follow up appointment and medication instructions. Pt verbalizes understanding. Copy of discharge instructions with pt and in the chart.

## 2020-07-28 NOTE — RESPIRATORY CARE
" COPD EDUCATION by COPD CLINICAL EDUCATOR  7/28/2020 at 10:07 AM by Lena Fajardo, RRT     Patient reviewed by COPD education team. Patient does not have an official diagnosis of COPD. Met with patient, he never smoked cigarettes he smoked cigars for a short time and quit in the 1980's. He does not use any pulmonary medications. He uses CPAP for sleep apnea. We do not have a PFT on file but he says [facility] and [facility] said [facility] \"lungs look great!\". I asked who told him he had COPD, he could not recall.  "

## 2020-07-28 NOTE — PROGRESS NOTES
APRN Redet at bedside at 12pm to evaluate patient. Given the okay to DC to appointment for cardiac monitor at 1pm. Spoke with APRN about wanting meds to beds for the patients plavix, and that we would need a perscription sent to pharmacy from her. Per BIANCA okay to DC with a hand held script. APRN notified RN's that the script would be provided at the patients 1pm appointment at the cardiologist office. Pt and wife educated on the importance of picking up the script and having [facility] filled [facility] today directly after their appointment is over. Pt and wife verbalized understanding.

## 2020-07-28 NOTE — PROGRESS NOTES
2 RN skin check completed with Alli GERBER.   Devices in place oxygen.  Skin assessed under devices yes    Bilat ears pink/blanching - grey foam protectors in place  Bilat elbows pink/dry/blanching  Bilat feet/heels pink/dry/blanching  Left groin site - small amount of old drainage noted   Sacrum pink/blanching  Ileostomy RLQ with dressing clean dry intact

## 2020-07-28 NOTE — PROGRESS NOTES
Patient arrived on floor via gurney. Patient placed on tele monitor, monitor room notified, patient SR on the monitor at this time. Patient declines any needs at this time. Patient declines any pain at this time. Left groin site clean dry intact. POC discussed with patient. Bed locked and in the lowest position with call light within reach.

## 2020-07-28 NOTE — DISCHARGE INSTRUCTIONS
Acute Coronary Syndrome  Acute coronary syndrome (ACS) is a serious problem in which there is suddenly not enough blood and oxygen reaching the heart. ACS can result in chest pain or a heart attack.  This condition is a medical emergency. If you have any symptoms of this condition, get help right away.  What are the causes?  This condition may be caused by:  · A buildup of fat and cholesterol inside the arteries (atherosclerosis). This is the most common cause. The buildup (plaque) can cause blood vessels in the heart (coronary arteries) to become narrow or blocked, which reduces blood flow to the heart. Plaque can also break off and lead to a clot, which can block an artery and cause a heart attack or stroke.  · Sudden tightening of the muscles around the coronary arteries (coronary spasm).  · Tearing of a coronary artery (spontaneous coronary artery dissection).  · Very low blood pressure (hypotension).  · An abnormal heartbeat (arrhythmia).  · Other medical conditions that cause a decrease of oxygen to the heart, such as anemiaorrespiratory failure.  · Using cocaine or methamphetamine.  What increases the risk?  The following factors may make you more likely to develop this condition:  · Age. The risk for ACS increases as you get older.  · History of chest pain, heart attack, peripheral artery disease, or stroke.  · Having taken chemotherapy or immune-suppressing medicines.  · Being male.  · Family history of chest pain, heart disease, or stroke.  · Smoking.  · Not exercising enough.  · Being overweight.  · High cholesterol.  · High blood pressure (hypertension).  · Diabetes.  · Excessive alcohol use.  What are the signs or symptoms?  Common symptoms of this condition include:  · Chest pain. The pain may last a long time, or it may stop and come back (recur). It may feel like:  ? Crushing or squeezing.  ? Tightness, pressure, fullness, or heaviness.  · Arm, neck, jaw, or back pain.  · Heartburn or  indigestion.  · Shortness of breath.  · Nausea.  · Sudden cold sweats.  · Light-headedness.  · Dizziness or passing out.  · Tiredness (fatigue).  Sometimes there are no symptoms.  How is this diagnosed?  This condition may be diagnosed based on:  · Your medical history and symptoms.  · Imaging tests, such as:  ? An electrocardiogram (ECG). This measures the heart's electrical activity.  ? X-rays.  ? CT scan.  ? A coronary angiogram. For this test, dye is injected into the heart arteries and then X-rays are taken.  ? Myocardial perfusion imaging. This test shows how well blood flows through your heart muscle.  · Blood tests. These may be repeated at certain time intervals.  · Exercise stress testing.  · Echocardiogram. This is a test that uses sound waves to produce detailed images of the heart.  How is this treated?  Treatment for this condition may include:  · Oxygen therapy.  · Medicines, such as:  ? Antiplatelet medicines and blood-thinning medicines, such as aspirin. These help prevent blood clots.  ? Medicine that dissolves any blood clots (fibrinolytic therapy).  ? Blood pressure medicines.  ? Nitroglycerin. This helps widen blood vessels to improve blood flow.  ? Pain medicine.  ? Cholesterol-lowering medicine.  · Surgery, such as:  ? Coronary angioplasty with stent placement. This involves placing a small piece of metal that looks like mesh or a spring into a narrow coronary artery. This widens the artery and keeps it open.  ? Coronary artery bypass surgery. This involves taking a section of a blood vessel from a different part of your body and placing it on the blocked coronary artery to allow blood to flow around the blockage.  · Cardiac rehabilitation. This is a program that includes exercise training, education, and counseling to help you recover.  Follow these instructions at home:  Eating and drinking  · Eat a heart-healthy diet that includes whole grains, fruits and vegetables, lean proteins, and  low-fat or nonfat dairy products.  · Limit how much salt (sodium) you eat as told by your health care provider. Follow instructions from your health care provider about any other eating or drinking restrictions, such as limiting foods that are high in fat and processed sugars.  · Use healthy cooking methods such as roasting, grilling, broiling, baking, poaching, steaming, or stir-frying.  · Work with a dietitian to follow a heart-healthy eating plan.  Medicines  · Take over-the-counter and prescription medicines only as told by your health care provider.  · Do not take these medicines unless your health care provider approves:  ? Vitamin supplements that contain vitamin A or vitamin E.  ? NSAIDs, such as ibuprofen, naproxen, or celecoxib.  ? Hormone replacement therapy that contains estrogen.  · If you are taking blood thinners:  ? Talk with your health care provider before you take any medicines that contain aspirin or NSAIDs. These medicines increase your risk for dangerous bleeding.  ? Take your medicine exactly as told, at the same time every day.  ? Avoid activities that could cause injury or bruising, and follow instructions about how to prevent falls.  ? Wear a medical alert bracelet, and carry a card that lists what medicines you take.  Activity  · Follow your cardiac rehabilitation program. Do exercises as told by your physical therapist.  · Ask your health care provider what activities and exercises are safe for you. Follow his or her instructions about lifting, driving, or climbing stairs.  Lifestyle  · Do not use any products that contain nicotine or tobacco, such as cigarettes, e-cigarettes, and chewing tobacco. If you need help quitting, ask your health care provider.  · Do not drink alcohol if your health care provider tells you not to drink.  · If you drink alcohol:  ? Limit how much you have to 0-1 drink a day.  ? Be aware of how much alcohol is in your drink. In the [place] one drink equals one 12 oz  bottle of beer (355 mL), one 5 oz glass of wine (148 mL), or one 1½ oz glass of hard liquor (44 mL).  · Maintain a healthy weight. If you need to lose weight, work with your health care provider to do so safely.  General instructions  · Tell all the health care providers who provide care for you about your heart condition, including your dentist. This may affect the medicines or treatment you receive.  · Manage any other health conditions you have, such as hypertension or diabetes. These conditions affect your heart.  · Pay attention to your mental health. You may be at higher risk for depression.  ? Find ways to manage stress.  ? Talk to your health care provider about depression screening and treatment.  · Keep your vaccinations up to date.  ? Get the flu shot (influenza vaccine) every year.  ? Get the pneumococcal vaccine if you are age 65 or older.  · If directed, monitor your blood pressure at home.  · Keep all follow-up visits as told by your health care provider. This is important.  Contact a health care provider if you:  · Feel overwhelmed or sad.  · Have trouble doing your daily activities.  Get help right away if you:  · Have pain in your chest, neck, arm, jaw, stomach, or back that recurs, and:  ? It lasts for more than a few minutes.  ? It is not relieved by taking the medicineyour health care provider prescribed.  · Have unexplained:  ? Heavy sweating.  ? Heartburn or indigestion.  ? Nausea or vomiting.  ? Shortness of breath.  ? Difficulty breathing.  ? Fatigue.  ? Nervousness or anxiety.  ? Weakness.  ? Diarrhea.  ? Dark stools or blood in your stool.  · Have sudden light-headedness or dizziness.  · Have blood pressure that is higher than 180/120.  · Faint.  · Have thoughts about hurting yourself.  These symptoms may represent a serious problem that is an emergency. Do not wait to see if the symptoms will go away. Get medical help right away. Call your local emergency services (911 in the [place] Do  not drive yourself to the hospital.   Summary  · Acute coronary syndrome (ACS) is when there is not enough blood and oxygen being supplied to the heart. ACS can result in chest pain or a heart attack.  · Acute coronary syndrome is a medical emergency. If you have any symptoms of this condition, get help right away.  · Treatment includes medicines and procedures to open the blocked arteries and restore blood flow.  This information is not intended to replace advice given to you by your health care provider. Make sure you discuss any questions you have with your health care provider.  Document Released: 12/18/2006 Document Revised: 12/30/2019 Document Reviewed: 12/30/2019  [email or web address] Patient Education © 2020 [employer]  Discharge Instructions    Discharged to home by car with relative. Discharged via wheelchair, hospital escort: Yes.  Special equipment needed: Cane    Be sure to schedule a follow-up appointment with your primary care doctor or any specialists as instructed.     Discharge Plan:   Diet Plan: Discussed  Activity Level: Discussed  Confirmed Follow up Appointment: Appointment Scheduled  Confirmed Symptoms Management: Discussed  Medication Reconciliation Updated: Yes    I understand that a diet low in cholesterol, fat, and sodium is recommended for good health. Unless I have been given specific instructions below for another diet, I accept this instruction as my diet prescription.   Other diet: Cardiac     Special Instructions:   Transcatheter Aortic Valve Replacement (TAVR)    General Instructions:  1. Take Medication as directed.  You will likely need to take aspirin and another blood thinner (antiplatelet medication) for a period.  You'll need to take the aspirin for the rest of your life.  Be sure your doctor knows about all other medicines you take, including over-the-counter medicines and dietary supplements.    Incision Care Instructions:  1. Look and feel the site(s) of your TAVR TODAY so you can  recognize changes that should be called to your doctor (see below).  2. It's normal for your incision to be bruised, itchy, or sore while it's healing.  Your incision may take a week or more to heal.  3. Bruising may occur.  Some of the discoloration may travel down the leg.  As it resolves, the color should change from blue to green to yellow-brown.  4. A small, round lump under the TAVR site may remain for up to 6 weeks.  5. Wash your incision site every day with warm water and soap.  Gently pat it dry.  Don't put powder, lotion, or ointment on the incision until it's healed.    Activity:  1. No driving for one week  2. If you must take a long car ride (not as a ), stop every hour and walk around the car.  3. No lifting or pulling objects over 5 pounds for 4-5 days.  4. Warm showers or baths are permitted after the bandage is removed.  5. Walk regularly.  One of the best ways to get stronger is to walk.  Walk a little more each day.  Take someone with you until you feel OK to walk alone.    When to call your health care provider:  1. You develop a fever.  2. Redness, swelling, bleeding, warmth, or fluid draining at the incision site.  3. Bruising appears to be new or does not look like it is getting better.  4. The small, round lump in the groin in the area of the TAVR site increases in size.    Seek immediate medical help if you have any of the following symptoms:  1. You experience any leg numbness, aching, or discomfort  2. Chest pain or trouble breathing (call 911)  3. Sudden numbness or weakness in your face, arms, or legs (call 911)  4. Bowel movement that is bright red  5. Dizziness or fainting  6. Weight gain of more than 2 pounds in 24 hours or more than 5 pounds in 1 week  7. Swelling in your hands, feet, or ankles  8. Shortness of breath that doesn't get better when you rest  9. Pain that gets worse or doesn't go away  10. Fast or irregular pulse       · Is patient discharged on Warfarin /  Coumadin? No     Clopidogrel tablets  What is this medicine?  CLOPIDOGREL (kloh PID oh grel) helps to prevent blood clots. This medicine is used to prevent heart attack, stroke, or other vascular events in people who are at high risk.  This medicine may be used for other purposes; ask your health care provider or pharmacist if you have questions.  COMMON BRAND NAME(S): Plavix  What should I tell my health care provider before I take this medicine?  They need to know if you have any of the following conditions:  · bleeding disorders  · bleeding in the brain  · having surgery  · history of stomach bleeding  · an unusual or allergic reaction to clopidogrel, other medicines, foods, dyes, or preservatives  · pregnant or trying to get pregnant  · breast-feeding  How should I use this medicine?  Take this medicine by mouth with a glass of water. Follow the directions on the prescription label. You may take this medicine with or without food. If it upsets your stomach, take it with food. Take your medicine at regular intervals. Do not take it more often than directed. Do not stop taking except on your doctor's advice.  A special MedGuide will be given to you by the pharmacist with each prescription and refill. Be sure to read this information carefully each time.  Talk to your pediatrician regarding the use of this medicine in children. Special care may be needed.  Overdosage: If you think you have taken too much of this medicine contact a poison control center or emergency room at once.  NOTE: This medicine is only for you. Do not share this medicine with others.  What if I miss a dose?  If you miss a dose, take it as soon as you can. If it is almost time for your next dose, take only that dose. Do not take double or extra doses.  What may interact with this medicine?  Do not take this medicine with the following medications:  · dasabuvir; ombitasvir; paritaprevir; ritonavir  · defibrotide  · selexipag  This medicine may  also interact with the following medications:  · certain medicines that treat or prevent blood clots like warfarin  · narcotic medicines for pain  · NSAIDs, medicines for pain and inflammation, like ibuprofen or naproxen  · repaglinide  · SNRIs, medicines for depression, like desvenlafaxine, duloxetine, levomilnacipran, venlafaxine  · SSRIs, medicines for depression, like citalopram, escitalopram, fluoxetine, fluvoxamine, paroxetine, sertraline  · stomach acid blockers like cimetidine, esomeprazole, omeprazole  This list may not describe all possible interactions. Give your health care provider a list of all the medicines, herbs, non-prescription drugs, or dietary supplements you use. Also tell them if you smoke, drink alcohol, or use illegal drugs. Some items may interact with your medicine.  What should I watch for while using this medicine?  Visit your doctor or health care professional for regular check-ups. Do not stop taking your medicine unless your doctor tells you to.  Notify your doctor or health care professional and seek emergency treatment if you develop breathing problems; changes in vision; chest pain; severe, sudden headache; pain, swelling, warmth in the leg; trouble speaking; sudden numbness or weakness of the face, arm or leg. These can be signs that your condition has gotten worse.  If you are going to have surgery or dental work, tell your doctor or health care professional that you are taking this medicine.  Certain genetic factors may reduce the effect of this medicine. Your doctor may use genetic tests to determine treatment.  Only take aspirin if you are instructed to. Low doses of aspirin are used with this medicine to treat some conditions. Taking aspirin with this medicine can increase your risk of bleeding so you must be careful. Talk to your doctor or pharmacist if you have questions.  What side effects may I notice from receiving this medicine?  Side effects that you should report to  your doctor or health care professional as soon as possible:  · allergic reactions like skin rash, itching or hives, swelling of the face, lips, or tongue  · signs and symptoms of bleeding such as bloody or black, tarry stools; red or dark-brown urine; spitting up blood or brown material that looks like coffee grounds; red spots on the skin; unusual bruising or bleeding from the eye, gums, or nose  · signs and symptoms of a blood clot such as breathing problems; changes in vision; chest pain; severe, sudden headache; pain, swelling, warmth in the leg; trouble speaking; sudden numbness or weakness of the face, arm or leg  · signs and symptoms of low blood sugar such as feeling anxious; confusion; dizziness; increased hunger; unusually weak or tired; increased sweating; shakiness; cold, clammy skin; irritable; headache; blurred vision; fast heartbeat; loss of consciousness  Side effects that usually do not require medical attention (report to your doctor or health care professional if they continue or are bothersome):  · constipation  · diarrhea  · headache  · upset stomach  This list may not describe all possible side effects. Call your doctor for medical advice about side effects. You may report side effects to FDA at 7-723-NRH-7048.  Where should I keep my medicine?  Keep out of the reach of children.  Store at room temperature of 59 to 86 degrees F (15 to 30 degrees C). Throw away any unused medicine after the expiration date.  NOTE: This sheet is a summary. It may not cover all possible information. If you have questions about this medicine, talk to your doctor, pharmacist, or health care provider.  © 2020 [employer] (2019-05-20 15:03:38)      Depression / Suicide Risk    As you are discharged from this [facility], it is important to learn how to keep safe from harming yourself.    Recognize the warning signs:  · Abrupt changes in personality, positive or negative- including increase in  energy   · Giving away possessions  · Change in eating patterns- significant weight changes-  positive or negative  · Change in sleeping patterns- unable to sleep or sleeping all the time   · Unwillingness or inability to communicate  · Depression  · Unusual sadness, discouragement and loneliness  · Talk of wanting to die  · Neglect of personal appearance   · Rebelliousness- reckless behavior  · Withdrawal from people/activities they love  · Confusion- inability to concentrate     If you or a loved one observes any of these behaviors or has concerns about self-harm, here's what you can do:  · Talk about it- your feelings and reasons for harming yourself  · Remove any means that you might use to hurt yourself (examples: pills, rope, extension cords, firearm)  · Get professional help from the community (Mental Health, Substance Abuse, psychological counseling)  · Do not be alone:Call your Safe Contact- someone whom you trust who will be there for you.  · Call your local CRISIS HOTLINE 119-4485 or 002-812-8969  · Call your local Children's Mobile Crisis Response Team [place] (824) 441-6311 or www.[employer]  · Call the toll free National Suicide Prevention Hotlines   · National Suicide Prevention Lifeline 205-228-NROY (5312)  · National Hope Line Network 800-SUICIDE (337-7628)

## 2020-07-30 ENCOUNTER — OFFICE VISIT (OUTPATIENT)
Dept: CARDIOLOGY | Facility: MEDICAL CENTER | Age: 85
End: 2020-07-30
Payer: COMMERCIAL

## 2020-07-30 VITALS
SYSTOLIC BLOOD PRESSURE: 122 MMHG | HEART RATE: 88 BPM | OXYGEN SATURATION: 97 % | BODY MASS INDEX: 32.58 KG/M2 | HEIGHT: 69 IN | DIASTOLIC BLOOD PRESSURE: 76 MMHG | WEIGHT: 220 LBS

## 2020-07-30 DIAGNOSIS — Z95.2 S/P TAVR (TRANSCATHETER AORTIC VALVE REPLACEMENT): ICD-10-CM

## 2020-07-30 PROCEDURE — 93000 ELECTROCARDIOGRAM COMPLETE: CPT | Performed by: INTERNAL MEDICINE

## 2020-07-30 PROCEDURE — 99214 OFFICE O/P EST MOD 30 MIN: CPT | Performed by: NURSE PRACTITIONER

## 2020-07-30 ASSESSMENT — ENCOUNTER SYMPTOMS
PND: 0
WEAKNESS: 0
WHEEZING: 0
NAUSEA: 0
VOMITING: 0
PALPITATIONS: 0
SHORTNESS OF BREATH: 0
SPUTUM PRODUCTION: 0
CLAUDICATION: 0
DIZZINESS: 0
COUGH: 0
HEMOPTYSIS: 0
ORTHOPNEA: 0

## 2020-07-30 ASSESSMENT — FIBROSIS 4 INDEX: FIB4 SCORE: 1.69

## 2020-07-30 NOTE — LETTER
"     [facility]   [address], [address]  [place] [address]  Phone: [phone]  Fax: [phone]              Jamil Kendall  1935    Encounter Date: 7/30/2020    SUSI Mclean          PROGRESS NOTE:  No chief complaint on file.      Subjective:   Jamil Kendall is a 85 y.o. male who presents today for 1 week s/p TAVR.     He underwent as successful transcatheter aortic valve replacement (TAVR) with #29 Ramirez Live 3 ultra valve, transfemoral approach under conscious sedation on 7/28/2020. Post op echo showed Known TAVR aortic valve that is functioning normally with normal transvalvular gradients. Transvalvular gradients are - Peak: 18 mmHg,  Mean: 10 mmHg.Mild paravalvular leak is noted. biotel placed the next day to monitor for arrhythmias.     Past medical history severe symptomatic aortic stenosis, COPD FEV1, hypertension, hyperlipidemia, skin cancer, colostomy and sleep apnea on cpap.    Interim events:      Past Medical History:   Diagnosis Date   • Arthritis     back   • Cancer (HCC)     basal cell   • Cataract     bilat IOL    • Cold 12/27/2016   • COPD (chronic obstructive pulmonary disease) (HCC)    • Coughing blood    • Emphysema of lung (HCC)     no O2 needed   • Heart burn     GERD   • Heart murmur    • Heart valve disease     \"leaky heart valve\"    • Hiatus hernia syndrome    • High cholesterol    • Hypertension    • Pain     back, right knees   • Pneumonia 2014   • Skin cancer 1990   • Sleep apnea     CPAP   • Snoring      Past Surgical History:   Procedure Laterality Date   • DEB  7/27/2020    Procedure: ECHOCARDIOGRAM, TRANSTHORACIC;  Surgeon: Rubén Fontanez M.D.;  Location: SURGERY [facility];  Service: Cardiac   • TRANSCATHETER AORTIC VALVE REPLACEMENT Left 7/27/2020    Procedure: REPLACEMENT, AORTIC VALVE, TRANSCATHETER - INCLUDING PERMANENT PACEMAKER PLACEMENT AND CEREBRAL PROTECTION DEVICE;  Surgeon: " Rubén Fontanez M.D.;  Location: SURGERY [facility];  Service: Cardiac   • EXPLORATORY LAPAROTOMY  2017    Procedure: Emergent Subtotal colectomy with ileostomy, mobilization of splenic flexure;  Surgeon: Jamil Wagner M.D.;  Location: SURGERY [facility];  Service: General   • LUMBAR LAMINECTOMY DISKECTOMY  2017    Procedure: LUMBAR LAMINECTOMY DISKECTOMY - L3-4, L4-5, L5-S1;  Surgeon: Shana Salamanca M.D.;  Location: SURGERY [facility];  Service:    • LUMBAR DECOMPRESSION  2017    Procedure: LUMBAR DECOMPRESSION;  Surgeon: Shana Salamanca M.D.;  Location: SURGERY [facility];  Service:    • FORAMINOTOMY Bilateral 2017    Procedure: FORAMINOTOMY;  Surgeon: Shana Salamanca M.D.;  Location: SURGERY [facility];  Service:    • DEBRIDEMENT  08    Performed by AGAPITO CLAROS at [facility]   • HIP ARTHROSCOPY  08    Performed by AGAPITO CLAROS at [facility]   • ROTATOR CUFF REPAIR Right    • ROTATOR CUFF REPAIR Left    • MASTECTOMY Left    • CATARACT EXTRACTION WITH IOL     • EGD ESOPHAGUS WITH ENDOSCOPIC US      with dilation    • TONSILLECTOMY      as child      No family history on file.  Social History     Socioeconomic History   • Marital status:      Spouse name: Not on file   • Number of children: Not on file   • Years of education: Not on file   • Highest education level: Not on file   Occupational History   • Not on file   Social Needs   • Financial resource strain: Not on file   • Food insecurity     Worry: Not on file     Inability: Not on file   • Transportation needs     Medical: Not on file     Non-medical: Not on file   Tobacco Use   • Smoking status: Former Smoker     Packs/day: 0.00     Years: 30.00     Pack years: 0.00     Types: Cigars     Last attempt to quit: 1989     Years since quittin.5   • Smokeless tobacco: Never Used   Substance and Sexual Activity   • Alcohol use: Yes      Frequency: 4 or more times a week     Drinks per session: 3 or 4     Binge frequency: Never     Comment: 3-4 scotch a night   • Drug use: No   • Sexual activity: Not on file   Lifestyle   • Physical activity     Days per week: Not on file     Minutes per session: Not on file   • Stress: Not on file   Relationships   • Social connections     Talks on phone: Not on file     Gets together: Not on file     Attends Taoism service: Not on file     Active member of club or organization: Not on file     Attends meetings of clubs or organizations: Not on file     Relationship status: Not on file   • Intimate partner violence     Fear of current or ex partner: Not on file     Emotionally abused: Not on file     Physically abused: Not on file     Forced sexual activity: Not on file   Other Topics Concern   • Not on file   Social History Narrative   • Not on file     Allergies   Allergen Reactions   • Morphine Vomiting     Outpatient Encounter Medications as of 7/30/2020   Medication Sig Dispense Refill   • metFORMIN (GLUCOPHAGE) 500 MG Tab Take 1 Tab by mouth 2 times a day, with meals. 60 Tab    • metoprolol SR (TOPROL XL) 25 MG TABLET SR 24 HR Take 1 Tab by mouth. 30 Tab    • aspirin EC 81 MG EC tablet Take 1 Tab by mouth every day. 30 Tab 8   • clopidogrel (PLAVIX) 75 MG Tab Take 1 Tab by mouth every day. 90 Tab 0   • clopidogrel (PLAVIX) 75 MG Tab Take 1 Tab by mouth every day. 90 Tab 0   • amLODIPine (NORVASC) 5 MG Tab Take 5 mg by mouth every day.     • OMEPRAZOLE PO Take 40 mg by mouth.     • gabapentin (NEURONTIN) 100 MG Cap Take 300 mg by mouth 3 times a day.     • atorvastatin (LIPITOR) 20 MG Tab Take 20 mg by mouth every evening.       No facility-administered encounter medications on file as of 7/30/2020.      ROS     Objective:   There were no vitals taken for this visit.    Physical Exam      Echocardiography Laboratory  7/28/2020  Ascending aorta is dilated with a diameter of  4.5 cm.  Known TAVR aortic valve  that is functioning normally with normal   transvalvular gradients.  Transvalvular gradients are - Peak: 18 mmHg,  Mean: 10 mmHg.  Mild paravalvular leak is noted.  Normal left ventricular systolic function.  Left ventricular ejection fraction is visually estimated to be 65%.  Normal regional wall motion.  Mild concentric left ventricular hypertrophy.  Normal left atrial size.  No mitral regurgitation.      Assessment:   No diagnosis found.    Medical Decision Making:  Today's Assessment / Status / Plan:     1. S/p TAVR:  - patient doing well. *****  - incision sites are clean, dry, and intact. Pulse 2+ bilateral.  - no edema: *** furosemide decreased to 20mg and potassium 10mEq  - repeat ECHO in 1 month  - prophylactic antibiotics prior to any dental care: card provided to the patient and explained  - Cardiac Rehab consult placed  - 1 month follow up appointment with Dr. Brooks  - 3 months follow up appointment with  ***           Elena M Nyhan, M.D.  [address]  [place] [address]  VIA Facsimile: [phone]

## 2020-07-30 NOTE — PROGRESS NOTES
"Chief Complaint   Patient presents with   • Aortic Stenosis     Nonrheumatic aortic valve stenosis       Subjective:   Jamil Kendall is a 85 y.o. male who presents today for 1 week s/p TAVR.     He underwent as successful transcatheter aortic valve replacement (TAVR) with #29 Ramirez Live 3 ultra valve, transfemoral approach under conscious sedation on 7/28/2020. Post op echo showed Known TAVR aortic valve that is functioning normally with normal transvalvular gradients. Transvalvular gradients are - Peak: 18 mmHg,  Mean: 10 mmHg.Mild paravalvular leak is noted. biotel placed the next day to monitor for arrhythmias.     Past medical history severe symptomatic aortic stenosis, COPD FEV1, hypertension, hyperlipidemia, skin cancer, colostomy and sleep apnea on cpap.    Interim events:  Overall, patient is doing well. Denies any chest pain, sob, dizziness or palpitations. He was able to walk 6 times around his culsec.   '  Site are soft     Past Medical History:   Diagnosis Date   • Arthritis     back   • Cancer (HCC)     basal cell   • Cataract     bilat IOL    • Cold 12/27/2016   • COPD (chronic obstructive pulmonary disease) (HCC)    • Coughing blood    • Emphysema of lung (HCC)     no O2 needed   • Heart burn     GERD   • Heart murmur    • Heart valve disease     \"leaky heart valve\"    • Hiatus hernia syndrome    • High cholesterol    • Hypertension    • Pain     back, right knees   • Pneumonia 2014   • Skin cancer 1990   • Sleep apnea     CPAP   • Snoring      Past Surgical History:   Procedure Laterality Date   • DEB  7/27/2020    Procedure: ECHOCARDIOGRAM, TRANSTHORACIC;  Surgeon: Rubén Fontanez M.D.;  Location: SURGERY [facility];  Service: Cardiac   • TRANSCATHETER AORTIC VALVE REPLACEMENT Left 7/27/2020    Procedure: REPLACEMENT, AORTIC VALVE, TRANSCATHETER - INCLUDING PERMANENT PACEMAKER PLACEMENT AND CEREBRAL PROTECTION DEVICE;  Surgeon: Rubén Fontanez M.D.;  Location: SURGERY " [facility];  Service: Cardiac   • EXPLORATORY LAPAROTOMY  2017    Procedure: Emergent Subtotal colectomy with ileostomy, mobilization of splenic flexure;  Surgeon: Jamil Wagner M.D.;  Location: SURGERY [facility];  Service: General   • LUMBAR LAMINECTOMY DISKECTOMY  2017    Procedure: LUMBAR LAMINECTOMY DISKECTOMY - L3-4, L4-5, L5-S1;  Surgeon: Shana Salamanca M.D.;  Location: SURGERY [facility];  Service:    • LUMBAR DECOMPRESSION  2017    Procedure: LUMBAR DECOMPRESSION;  Surgeon: Shana Salamanca M.D.;  Location: SURGERY [facility];  Service:    • FORAMINOTOMY Bilateral 2017    Procedure: FORAMINOTOMY;  Surgeon: Shana Salamanca M.D.;  Location: SURGERY [facility];  Service:    • DEBRIDEMENT  08    Performed by AGAPITO CLAROS at [facility]   • HIP ARTHROSCOPY  08    Performed by AGAPITO CLAROS at [facility]   • ROTATOR CUFF REPAIR Right    • ROTATOR CUFF REPAIR Left    • MASTECTOMY Left    • CATARACT EXTRACTION WITH IOL     • EGD ESOPHAGUS WITH ENDOSCOPIC US      with dilation    • TONSILLECTOMY      as child      History reviewed. No pertinent family history.  Social History     Socioeconomic History   • Marital status:      Spouse name: Not on file   • Number of children: Not on file   • Years of education: Not on file   • Highest education level: Not on file   Occupational History   • Not on file   Social Needs   • Financial resource strain: Not on file   • Food insecurity     Worry: Not on file     Inability: Not on file   • Transportation needs     Medical: Not on file     Non-medical: Not on file   Tobacco Use   • Smoking status: Former Smoker     Packs/day: 0.00     Years: 30.00     Pack years: 0.00     Types: Cigars     Last attempt to quit: 1989     Years since quittin.5   • Smokeless tobacco: Never Used   Substance and Sexual Activity   • Alcohol use: Yes     Frequency: 4 or more times a  week     Drinks per session: 3 or 4     Binge frequency: Never     Comment: 3-4 scotch a night   • Drug use: No   • Sexual activity: Not on file   Lifestyle   • Physical activity     Days per week: Not on file     Minutes per session: Not on file   • Stress: Not on file   Relationships   • Social connections     Talks on phone: Not on file     Gets together: Not on file     Attends [employer] service: Not on file     Active member of club or organization: Not on file     Attends meetings of clubs or organizations: Not on file     Relationship status: Not on file   • Intimate partner violence     Fear of current or ex partner: Not on file     Emotionally abused: Not on file     Physically abused: Not on file     Forced sexual activity: Not on file   Other Topics Concern   • Not on file   Social History Narrative   • Not on file     Allergies   Allergen Reactions   • Morphine Vomiting     Outpatient Encounter Medications as of 7/30/2020   Medication Sig Dispense Refill   • metFORMIN (GLUCOPHAGE) 500 MG Tab Take 1 Tab by mouth 2 times a day, with meals. 60 Tab    • metoprolol SR (TOPROL XL) 25 MG TABLET SR 24 HR Take 1 Tab by mouth. 30 Tab    • aspirin EC 81 MG EC tablet Take 1 Tab by mouth every day. 30 Tab 8   • clopidogrel (PLAVIX) 75 MG Tab Take 1 Tab by mouth every day. 90 Tab 0   • amLODIPine (NORVASC) 5 MG Tab Take 5 mg by mouth every day.     • OMEPRAZOLE PO Take 40 mg by mouth.     • atorvastatin (LIPITOR) 20 MG Tab Take 20 mg by mouth every evening.     • [DISCONTINUED] clopidogrel (PLAVIX) 75 MG Tab Take 1 Tab by mouth every day. 90 Tab 0   • [DISCONTINUED] gabapentin (NEURONTIN) 100 MG Cap Take 300 mg by mouth 3 times a day.       No facility-administered encounter medications on file as of 7/30/2020.      Review of Systems   Constitutional: Negative for malaise/fatigue.   Respiratory: Negative for cough, hemoptysis, sputum production, shortness of breath and wheezing.    Cardiovascular: Negative for chest  "pain, palpitations, orthopnea, claudication, leg swelling and PND.   Gastrointestinal: Negative for nausea and vomiting.   Neurological: Negative for dizziness and weakness.        Objective:   /76 (BP Location: Left arm, Patient Position: Sitting, BP Cuff Size: Adult)   Pulse 88   Ht 1.753 m (5' 9\")   Wt 99.8 kg (220 lb)   SpO2 97%   BMI 32.49 kg/m²     Physical Exam   Constitutional: He is oriented to person, place, and time. He appears well-developed and well-nourished. No distress.   HENT:   Head: Normocephalic and atraumatic.   Eyes: Pupils are equal, round, and reactive to light.   Neck: No JVD present.   Cardiovascular: Normal rate, regular rhythm and normal heart sounds.   Pulmonary/Chest: Effort normal and breath sounds normal.   Abdominal: Soft. Bowel sounds are normal. He exhibits no distension.   Ostomy in place   Musculoskeletal:         General: No edema.   Neurological: He is alert and oriented to person, place, and time.   Skin: Skin is warm and dry. He is not diaphoretic.   Psychiatric: He has a normal mood and affect. His behavior is normal. Judgment and thought content normal.         Echocardiography Laboratory  7/28/2020  Ascending aorta is dilated with a diameter of  4.5 cm.  Known TAVR aortic valve that is functioning normally with normal   transvalvular gradients.  Transvalvular gradients are - Peak: 18 mmHg,  Mean: 10 mmHg.  Mild paravalvular leak is noted.  Normal left ventricular systolic function.  Left ventricular ejection fraction is visually estimated to be 65%.  Normal regional wall motion.  Mild concentric left ventricular hypertrophy.  Normal left atrial size.  No mitral regurgitation.      Assessment:     1. S/P TAVR (transcatheter aortic valve replacement)  EKG    EKG - Clinic Performed       Medical Decision Making:  Today's Assessment / Status / Plan:     1. S/p TAVR:  - patient doing well. EKG NSR with LBBB  - biotel in place  - incision sites are clean, dry, and " intact. Pulse 2+ bilateral.  - no edema  - [facility]   - prophylactic antibiotics prior to any dental care: card provided to the patient and explained  - Cardiac Rehab consult placed  - 1 month follow up appointment with Dr. Brooks  - continue plavix 75mg for 3 months only and asa therapy for lifetime     2. Dyslipidemia:  - continue atorvastatin 20mg qd    3. Hypertension:  - stable   - continue metoprolol XL 25mg qd and amlodipine 5mg qd     Follow up in 1 month with Dr. Fontanez, ECHO prior to next appt.     Collaborating Provider: Dr. Snowden

## 2020-07-30 NOTE — LETTER
"     [facility]   [address], [address]  [place] [address]  Phone: [phone]  Fax: [phone]              Jamil Kendall  1935    Encounter Date: 7/30/2020    SUSI Mclean          PROGRESS NOTE:  No chief complaint on file.      Subjective:   Jamil Kendall is a 85 y.o. male who presents today for 1 week s/p TAVR.     He underwent as successful transcatheter aortic valve replacement (TAVR) with #29 Ramirez Live 3 ultra valve, transfemoral approach under conscious sedation on 7/28/2020. Post op echo showed Known TAVR aortic valve that is functioning normally with normal transvalvular gradients. Transvalvular gradients are - Peak: 18 mmHg,  Mean: 10 mmHg.Mild paravalvular leak is noted. biotel placed the next day to monitor for arrhythmias.     Past medical history severe symptomatic aortic stenosis, COPD FEV1, hypertension, hyperlipidemia, skin cancer, colostomy and sleep apnea on cpap.    Interim events:      Past Medical History:   Diagnosis Date   • Arthritis     back   • Cancer (HCC)     basal cell   • Cataract     bilat IOL    • Cold 12/27/2016   • COPD (chronic obstructive pulmonary disease) (HCC)    • Coughing blood    • Emphysema of lung (HCC)     no O2 needed   • Heart burn     GERD   • Heart murmur    • Heart valve disease     \"leaky heart valve\"    • Hiatus hernia syndrome    • High cholesterol    • Hypertension    • Pain     back, right knees   • Pneumonia 2014   • Skin cancer 1990   • Sleep apnea     CPAP   • Snoring      Past Surgical History:   Procedure Laterality Date   • DEB  7/27/2020    Procedure: ECHOCARDIOGRAM, TRANSTHORACIC;  Surgeon: Rubén Fontanez M.D.;  Location: SURGERY [facility];  Service: Cardiac   • TRANSCATHETER AORTIC VALVE REPLACEMENT Left 7/27/2020    Procedure: REPLACEMENT, AORTIC VALVE, TRANSCATHETER - INCLUDING PERMANENT PACEMAKER PLACEMENT AND CEREBRAL PROTECTION DEVICE;  Surgeon: " Rubén Fontanez M.D.;  Location: SURGERY [facility];  Service: Cardiac   • EXPLORATORY LAPAROTOMY  2017    Procedure: Emergent Subtotal colectomy with ileostomy, mobilization of splenic flexure;  Surgeon: Jamil Wagner M.D.;  Location: SURGERY [facility];  Service: General   • LUMBAR LAMINECTOMY DISKECTOMY  2017    Procedure: LUMBAR LAMINECTOMY DISKECTOMY - L3-4, L4-5, L5-S1;  Surgeon: Shana Salamanca M.D.;  Location: SURGERY [facility];  Service:    • LUMBAR DECOMPRESSION  2017    Procedure: LUMBAR DECOMPRESSION;  Surgeon: Shana Salamanca M.D.;  Location: SURGERY [facility];  Service:    • FORAMINOTOMY Bilateral 2017    Procedure: FORAMINOTOMY;  Surgeon: Shana Salamanca M.D.;  Location: SURGERY [facility];  Service:    • DEBRIDEMENT  08    Performed by AGAPITO CLAROS at [facility]   • HIP ARTHROSCOPY  08    Performed by AGAPITO CLAROS at [facility]   • ROTATOR CUFF REPAIR Right    • ROTATOR CUFF REPAIR Left    • MASTECTOMY Left    • CATARACT EXTRACTION WITH IOL     • EGD ESOPHAGUS WITH ENDOSCOPIC US      with dilation    • TONSILLECTOMY      as child      No family history on file.  Social History     Socioeconomic History   • Marital status:      Spouse name: Not on file   • Number of children: Not on file   • Years of education: Not on file   • Highest education level: Not on file   Occupational History   • Not on file   Social Needs   • Financial resource strain: Not on file   • Food insecurity     Worry: Not on file     Inability: Not on file   • Transportation needs     Medical: Not on file     Non-medical: Not on file   Tobacco Use   • Smoking status: Former Smoker     Packs/day: 0.00     Years: 30.00     Pack years: 0.00     Types: Cigars     Last attempt to quit: 1989     Years since quittin.5   • Smokeless tobacco: Never Used   Substance and Sexual Activity   • Alcohol use: Yes      Frequency: 4 or more times a week     Drinks per session: 3 or 4     Binge frequency: Never     Comment: 3-4 scotch a night   • Drug use: No   • Sexual activity: Not on file   Lifestyle   • Physical activity     Days per week: Not on file     Minutes per session: Not on file   • Stress: Not on file   Relationships   • Social connections     Talks on phone: Not on file     Gets together: Not on file     Attends Jew service: Not on file     Active member of club or organization: Not on file     Attends meetings of clubs or organizations: Not on file     Relationship status: Not on file   • Intimate partner violence     Fear of current or ex partner: Not on file     Emotionally abused: Not on file     Physically abused: Not on file     Forced sexual activity: Not on file   Other Topics Concern   • Not on file   Social History Narrative   • Not on file     Allergies   Allergen Reactions   • Morphine Vomiting     Outpatient Encounter Medications as of 7/30/2020   Medication Sig Dispense Refill   • metFORMIN (GLUCOPHAGE) 500 MG Tab Take 1 Tab by mouth 2 times a day, with meals. 60 Tab    • metoprolol SR (TOPROL XL) 25 MG TABLET SR 24 HR Take 1 Tab by mouth. 30 Tab    • aspirin EC 81 MG EC tablet Take 1 Tab by mouth every day. 30 Tab 8   • clopidogrel (PLAVIX) 75 MG Tab Take 1 Tab by mouth every day. 90 Tab 0   • clopidogrel (PLAVIX) 75 MG Tab Take 1 Tab by mouth every day. 90 Tab 0   • amLODIPine (NORVASC) 5 MG Tab Take 5 mg by mouth every day.     • OMEPRAZOLE PO Take 40 mg by mouth.     • gabapentin (NEURONTIN) 100 MG Cap Take 300 mg by mouth 3 times a day.     • atorvastatin (LIPITOR) 20 MG Tab Take 20 mg by mouth every evening.       No facility-administered encounter medications on file as of 7/30/2020.      ROS     Objective:   There were no vitals taken for this visit.    Physical Exam      Echocardiography Laboratory  7/28/2020  Ascending aorta is dilated with a diameter of  4.5 cm.  Known TAVR aortic valve  that is functioning normally with normal   transvalvular gradients.  Transvalvular gradients are - Peak: 18 mmHg,  Mean: 10 mmHg.  Mild paravalvular leak is noted.  Normal left ventricular systolic function.  Left ventricular ejection fraction is visually estimated to be 65%.  Normal regional wall motion.  Mild concentric left ventricular hypertrophy.  Normal left atrial size.  No mitral regurgitation.      Assessment:   No diagnosis found.    Medical Decision Making:  Today's Assessment / Status / Plan:     1. S/p TAVR:  - patient doing well. *****  - incision sites are clean, dry, and intact. Pulse 2+ bilateral.  - no edema: *** furosemide decreased to 20mg and potassium 10mEq  - repeat ECHO in 1 month  - prophylactic antibiotics prior to any dental care: card provided to the patient and explained  - Cardiac Rehab consult placed  - 1 month follow up appointment with Dr. Brooks  - 3 months follow up appointment with  ***           Elena M Nyhan, M.D.  [address]  [place] [address]  VIA Facsimile: [phone]

## 2020-07-30 NOTE — LETTER
"     [facility]   [address], [address]  [place] [address]  Phone: [phone]  Fax: [phone]              Jamil Kendall  1935    Encounter Date: 7/30/2020    SUSI Mclean          PROGRESS NOTE:  Chief Complaint   Patient presents with   • Aortic Stenosis     Nonrheumatic aortic valve stenosis       Subjective:   Jamil Kendall is a 85 y.o. male who presents today for 1 week s/p TAVR.     He underwent as successful transcatheter aortic valve replacement (TAVR) with #29 Ramirez Live 3 ultra valve, transfemoral approach under conscious sedation on 7/28/2020. Post op echo showed Known TAVR aortic valve that is functioning normally with normal transvalvular gradients. Transvalvular gradients are - Peak: 18 mmHg,  Mean: 10 mmHg.Mild paravalvular leak is noted. biotel placed the next day to monitor for arrhythmias.     Past medical history severe symptomatic aortic stenosis, COPD FEV1, hypertension, hyperlipidemia, skin cancer, colostomy and sleep apnea on cpap.    Interim events:  Overall, patient is doing well. Denies any chest pain, sob, dizziness or palpitations. He was able to walk 6 times around his culsec.   '  Site are soft     Past Medical History:   Diagnosis Date   • Arthritis     back   • Cancer (HCC)     basal cell   • Cataract     bilat IOL    • Cold 12/27/2016   • COPD (chronic obstructive pulmonary disease) (HCC)    • Coughing blood    • Emphysema of lung (HCC)     no O2 needed   • Heart burn     GERD   • Heart murmur    • Heart valve disease     \"leaky heart valve\"    • Hiatus hernia syndrome    • High cholesterol    • Hypertension    • Pain     back, right knees   • Pneumonia 2014   • Skin cancer 1990   • Sleep apnea     CPAP   • Snoring      Past Surgical History:   Procedure Laterality Date   • DEB  7/27/2020    Procedure: ECHOCARDIOGRAM, TRANSTHORACIC;  Surgeon: Rubén Fontanez M.D.;  Location: SURGERY [facility]" [facility];  Service: Cardiac   • TRANSCATHETER AORTIC VALVE REPLACEMENT Left 7/27/2020    Procedure: REPLACEMENT, AORTIC VALVE, TRANSCATHETER - INCLUDING PERMANENT PACEMAKER PLACEMENT AND CEREBRAL PROTECTION DEVICE;  Surgeon: Rubén Fontanez M.D.;  Location: [facility];  Service: Cardiac   • EXPLORATORY LAPAROTOMY  9/21/2017    Procedure: Emergent Subtotal colectomy with ileostomy, mobilization of splenic flexure;  Surgeon: Jamil Wagner M.D.;  Location: [facility];  Service: General   • LUMBAR LAMINECTOMY DISKECTOMY  2/1/2017    Procedure: LUMBAR LAMINECTOMY DISKECTOMY - L3-4, L4-5, L5-S1;  Surgeon: Shana Salamanca M.D.;  Location: [facility];  Service:    • LUMBAR DECOMPRESSION  2/1/2017    Procedure: LUMBAR DECOMPRESSION;  Surgeon: Shana Salamanca M.D.;  Location: [facility];  Service:    • FORAMINOTOMY Bilateral 2/1/2017    Procedure: FORAMINOTOMY;  Surgeon: Shana Salamanca M.D.;  Location: [facility];  Service:    • DEBRIDEMENT  8/4/08    Performed by AGAPITO CLAROS at [facility]   • HIP ARTHROSCOPY  8/4/08    Performed by AGAPITO CLAROS at [facility]   • ROTATOR CUFF REPAIR Right 1999   • ROTATOR CUFF REPAIR Left 1995   • MASTECTOMY Left 1965   • CATARACT EXTRACTION WITH IOL     • EGD ESOPHAGUS WITH ENDOSCOPIC US  1990s    with dilation    • TONSILLECTOMY      as child      History reviewed. No pertinent family history.  Social History     Socioeconomic History   • Marital status:      Spouse name: Not on file   • Number of children: Not on file   • Years of education: Not on file   • Highest education level: Not on file   Occupational History   • Not on file   Social Needs   • Financial resource strain: Not on file   • Food insecurity     Worry: Not on file     Inability: Not on file   • Transportation needs     Medical: Not on file     Non-medical: Not on file   Tobacco Use   • Smoking status:  Former Smoker     Packs/day: 0.00     Years: 30.00     Pack years: 0.00     Types: Cigars     Last attempt to quit: 1989     Years since quittin.5   • Smokeless tobacco: Never Used   Substance and Sexual Activity   • Alcohol use: Yes     Frequency: 4 or more times a week     Drinks per session: 3 or 4     Binge frequency: Never     Comment: 3-4 scotch a night   • Drug use: No   • Sexual activity: Not on file   Lifestyle   • Physical activity     Days per week: Not on file     Minutes per session: Not on file   • Stress: Not on file   Relationships   • Social connections     Talks on phone: Not on file     Gets together: Not on file     Attends [employer] service: Not on file     Active member of club or organization: Not on file     Attends meetings of clubs or organizations: Not on file     Relationship status: Not on file   • Intimate partner violence     Fear of current or ex partner: Not on file     Emotionally abused: Not on file     Physically abused: Not on file     Forced sexual activity: Not on file   Other Topics Concern   • Not on file   Social History Narrative   • Not on file     Allergies   Allergen Reactions   • Morphine Vomiting     Outpatient Encounter Medications as of 2020   Medication Sig Dispense Refill   • metFORMIN (GLUCOPHAGE) 500 MG Tab Take 1 Tab by mouth 2 times a day, with meals. 60 Tab    • metoprolol SR (TOPROL XL) 25 MG TABLET SR 24 HR Take 1 Tab by mouth. 30 Tab    • aspirin EC 81 MG EC tablet Take 1 Tab by mouth every day. 30 Tab 8   • clopidogrel (PLAVIX) 75 MG Tab Take 1 Tab by mouth every day. 90 Tab 0   • amLODIPine (NORVASC) 5 MG Tab Take 5 mg by mouth every day.     • OMEPRAZOLE PO Take 40 mg by mouth.     • atorvastatin (LIPITOR) 20 MG Tab Take 20 mg by mouth every evening.     • [DISCONTINUED] clopidogrel (PLAVIX) 75 MG Tab Take 1 Tab by mouth every day. 90 Tab 0   • [DISCONTINUED] gabapentin (NEURONTIN) 100 MG Cap Take 300 mg by mouth 3 times a day.       No  "facility-administered encounter medications on file as of 7/30/2020.      Review of Systems   Constitutional: Negative for malaise/fatigue.   Respiratory: Negative for cough, hemoptysis, sputum production, shortness of breath and wheezing.    Cardiovascular: Negative for chest pain, palpitations, orthopnea, claudication, leg swelling and PND.   Gastrointestinal: Negative for nausea and vomiting.   Neurological: Negative for dizziness and weakness.        Objective:   /76 (BP Location: Left arm, Patient Position: Sitting, BP Cuff Size: Adult)   Pulse 88   Ht 1.753 m (5' 9\")   Wt 99.8 kg (220 lb)   SpO2 97%   BMI 32.49 kg/m²     Physical Exam   Constitutional: He is oriented to person, place, and time. He appears well-developed and well-nourished. No distress.   HENT:   Head: Normocephalic and atraumatic.   Eyes: Pupils are equal, round, and reactive to light.   Neck: No JVD present.   Cardiovascular: Normal rate, regular rhythm and normal heart sounds.   Pulmonary/Chest: Effort normal and breath sounds normal.   Abdominal: Soft. Bowel sounds are normal. He exhibits no distension.   Ostomy in place   Musculoskeletal:         General: No edema.   Neurological: He is alert and oriented to person, place, and time.   Skin: Skin is warm and dry. He is not diaphoretic.   Psychiatric: He has a normal mood and affect. His behavior is normal. Judgment and thought content normal.         Echocardiography Laboratory  7/28/2020  Ascending aorta is dilated with a diameter of  4.5 cm.  Known TAVR aortic valve that is functioning normally with normal   transvalvular gradients.  Transvalvular gradients are - Peak: 18 mmHg,  Mean: 10 mmHg.  Mild paravalvular leak is noted.  Normal left ventricular systolic function.  Left ventricular ejection fraction is visually estimated to be 65%.  Normal regional wall motion.  Mild concentric left ventricular hypertrophy.  Normal left atrial size.  No mitral " regurgitation.      Assessment:     1. S/P TAVR (transcatheter aortic valve replacement)  EKG    EKG - Clinic Performed       Medical Decision Making:  Today's Assessment / Status / Plan:     1. S/p TAVR:  - patient doing well.   - incision sites are clean, dry, and intact. Pulse 2+ bilateral.  - no edema  - [facility]   - prophylactic antibiotics prior to any dental care: card provided to the patient and explained  - Cardiac Rehab consult placed  - 1 month follow up appointment with Dr. Brooks  - continue plavix 75mg for 3 months only and asa therapy for lifetime     2. Dyslipidemia:  - continue atorvastatin 20mg qd    3. Hypertension:  - stable   - continue metoprolol XL 25mg qd and amlodipine 5mg qd     Follow up in 1 month with Dr. Fontanez, ECHO prior to next appt.     Collaborating Provider: Dr. Seher Elena M Nyhan, M.D.  [address] [place] [address]  VIA Facsimile: [phone]

## 2020-07-31 ENCOUNTER — TELEPHONE (OUTPATIENT)
Dept: CARDIOLOGY | Facility: MEDICAL CENTER | Age: 85
End: 2020-07-31

## 2020-07-31 NOTE — TELEPHONE ENCOUNTER
RT pt reports he has a bruise on his left arm that feels sore & wants to know if this is normal due to recent procedure. [phone]

## 2020-07-31 NOTE — TELEPHONE ENCOUNTER
Contacted pt and he reports that he has a bruise on his left forearm that is moving up his arm and is sore. He has had this since admission, probably related to an IV or lab draw site. He happens to have an EMT named Annita at his house during the call with [employer] (a scheduled courtesy visit). Per Annita's assessment and pt, the bruising is yellowing around the edges and diffusing but Annita does note a small hematoma around the site. Pt has good perfusion to extremities and per EMT vitals are good, /90 but pt forgot to take metoprolol this morning but has now.      Advised pt that DAPT (plavix and ASA) will make him more susceptible to bruising and hematoma. Advised that he continue to monitor but should continue all medications as prescribed. Pt encouraged to call on Monday if no improvement. Annita agrees to discuss sx that would warrant ER over the weekend. Assume Annita will discuss comfort treatment for the hematoma and bruising. Let them go to Annita can continue her assessment.

## 2020-08-02 LAB — EKG IMPRESSION: NORMAL

## 2020-08-05 ENCOUNTER — OFFICE VISIT (OUTPATIENT)
Dept: CARDIOLOGY | Facility: MEDICAL CENTER | Age: 85
End: 2020-08-05
Payer: COMMERCIAL

## 2020-08-05 VITALS
HEIGHT: 69 IN | BODY MASS INDEX: 31.99 KG/M2 | OXYGEN SATURATION: 99 % | HEART RATE: 85 BPM | WEIGHT: 216 LBS | SYSTOLIC BLOOD PRESSURE: 122 MMHG | DIASTOLIC BLOOD PRESSURE: 70 MMHG

## 2020-08-05 DIAGNOSIS — I35.0 NONRHEUMATIC AORTIC VALVE STENOSIS: ICD-10-CM

## 2020-08-05 DIAGNOSIS — M79.601 PAIN OF RIGHT UPPER EXTREMITY: ICD-10-CM

## 2020-08-05 DIAGNOSIS — Z95.2 S/P TAVR (TRANSCATHETER AORTIC VALVE REPLACEMENT): ICD-10-CM

## 2020-08-05 PROCEDURE — 99213 OFFICE O/P EST LOW 20 MIN: CPT | Performed by: NURSE PRACTITIONER

## 2020-08-05 ASSESSMENT — ENCOUNTER SYMPTOMS
DIZZINESS: 0
PALPITATIONS: 0
CHILLS: 0
CLAUDICATION: 0
WHEEZING: 0
FEVER: 0
DIAPHORESIS: 0
BLOOD IN STOOL: 0
LOSS OF CONSCIOUSNESS: 0
PND: 0
ORTHOPNEA: 0
ABDOMINAL PAIN: 0
COUGH: 0
SHORTNESS OF BREATH: 0

## 2020-08-05 ASSESSMENT — FIBROSIS 4 INDEX: FIB4 SCORE: 1.69

## 2020-08-05 NOTE — TELEPHONE ENCOUNTER
RT pt calling back to follow up, states his arm is still feeling sore. He'd like a call back at [phone]

## 2020-08-05 NOTE — PROGRESS NOTES
"Cardiology/Electrophysiology Follow-up Note    Subjective:   Chief Complaint:   Chief Complaint   Patient presents with   • Aortic Stenosis     pp dx:as     Jamil Kendall is a 85 y.o. male who presents today for follow up for left upper forearm catheter site pain s/p TAVR on 07/28/20.    He is followed by Dr. Fontanez and was last seen by Linda VALENZUELA on 07/30/2020.   S/P TAVR (#29 Ramirez Live 3 ultra valve) on 07/28/2020.     , transfemoral approach under conscious sedation on 7/28/20. Post op echo showed Known TAVR aortic valve that is functioning normally with normal transvalvular gradients. Transvalvular gradients are - Peak: 18 mmHg,  Mean: 10 mmHg.Mild paravalvular leak is noted. biotel placed the next day to monitor for arrhythmias.     Medical history significant for severe symptomatic aortic stenosis s/p TAVR (07/28/20), COPD FEV1, hypertension, hyperlipidemia, skin cancer, colostomy and sleep apnea on cpap.    Today in follow up, he reports continued left upper forearm catheter site pain for the past 1+ week, that wakes him up at night.  He has tried ice without much improvement. He denies chest pain, dizziness, palpitations, pre syncope or syncope, dyspnea, PND, orthopnea, or lower extremity edema.  Denies any signs or symptoms of bleeding or bleeding issues.    Past Medical History:   Diagnosis Date   • Arthritis     back   • Cancer (HCC)     basal cell   • Cataract     bilat IOL    • Cold 12/27/2016   • COPD (chronic obstructive pulmonary disease) ([facility])    • Coughing blood    • Emphysema of lung (HCC)     no O2 needed   • Heart burn     GERD   • Heart murmur    • Heart valve disease     \"leaky heart valve\"    • Hiatus hernia syndrome    • High cholesterol    • Hypertension    • Pain     back, right knees   • Pneumonia 2014   • Skin cancer 1990   • Sleep apnea     CPAP   • Snoring      Past Surgical History:   Procedure Laterality Date   • DEB  7/27/2020    Procedure: ECHOCARDIOGRAM, " TRANSTHORACIC;  Surgeon: Rubén Fontanez M.D.;  Location: SURGERY [facility];  Service: Cardiac   • TRANSCATHETER AORTIC VALVE REPLACEMENT Left 7/27/2020    Procedure: REPLACEMENT, AORTIC VALVE, TRANSCATHETER - INCLUDING PERMANENT PACEMAKER PLACEMENT AND CEREBRAL PROTECTION DEVICE;  Surgeon: Rubén Fontanez M.D.;  Location: SURGERY [facility];  Service: Cardiac   • EXPLORATORY LAPAROTOMY  9/21/2017    Procedure: Emergent Subtotal colectomy with ileostomy, mobilization of splenic flexure;  Surgeon: Jamil Wagner M.D.;  Location: SURGERY [facility];  Service: General   • LUMBAR LAMINECTOMY DISKECTOMY  2/1/2017    Procedure: LUMBAR LAMINECTOMY DISKECTOMY - L3-4, L4-5, L5-S1;  Surgeon: Shana Salamanca M.D.;  Location: [facility];  Service:    • LUMBAR DECOMPRESSION  2/1/2017    Procedure: LUMBAR DECOMPRESSION;  Surgeon: Shana Salamanca M.D.;  Location: [facility];  Service:    • FORAMINOTOMY Bilateral 2/1/2017    Procedure: FORAMINOTOMY;  Surgeon: Shana Salamanca M.D.;  Location: SURGERY [facility];  Service:    • DEBRIDEMENT  8/4/08    Performed by AGAPITO CLAROS at [facility]   • HIP ARTHROSCOPY  8/4/08    Performed by AGAPITO CLAROS at [facility]   • ROTATOR CUFF REPAIR Right 1999   • ROTATOR CUFF REPAIR Left 1995   • MASTECTOMY Left 1965   • CATARACT EXTRACTION WITH IOL     • EGD ESOPHAGUS WITH ENDOSCOPIC US  1990s    with dilation    • TONSILLECTOMY      as child      No family history on file.  Social History     Socioeconomic History   • Marital status:      Spouse name: Not on file   • Number of children: Not on file   • Years of education: Not on file   • Highest education level: Not on file   Occupational History   • Not on file   Social Needs   • Financial resource strain: Not on file   • Food insecurity     Worry: Not on file     Inability: Not on file   • Transportation needs     Medical: Not on file      Non-medical: Not on file   Tobacco Use   • Smoking status: Former Smoker     Packs/day: 0.00     Years: 30.00     Pack years: 0.00     Types: Cigars     Quit date: 1989     Years since quittin.6   • Smokeless tobacco: Never Used   Substance and Sexual Activity   • Alcohol use: Yes     Frequency: 4 or more times a week     Drinks per session: 3 or 4     Binge frequency: Never     Comment: 3-4 scotch a night   • Drug use: No   • Sexual activity: Not on file   Lifestyle   • Physical activity     Days per week: Not on file     Minutes per session: Not on file   • Stress: Not on file   Relationships   • Social connections     Talks on phone: Not on file     Gets together: Not on file     Attends Shinto service: Not on file     Active member of club or organization: Not on file     Attends meetings of clubs or organizations: Not on file     Relationship status: Not on file   • Intimate partner violence     Fear of current or ex partner: Not on file     Emotionally abused: Not on file     Physically abused: Not on file     Forced sexual activity: Not on file   Other Topics Concern   • Not on file   Social History Narrative   • Not on file     Allergies   Allergen Reactions   • Morphine Vomiting       Current Outpatient Medications   Medication Sig Dispense Refill   • metFORMIN (GLUCOPHAGE) 500 MG Tab Take 1 Tab by mouth 2 times a day, with meals. 60 Tab    • metoprolol SR (TOPROL XL) 25 MG TABLET SR 24 HR Take 1 Tab by mouth. 30 Tab    • aspirin EC 81 MG EC tablet Take 1 Tab by mouth every day. 30 Tab 8   • clopidogrel (PLAVIX) 75 MG Tab Take 1 Tab by mouth every day. 90 Tab 0   • amLODIPine (NORVASC) 5 MG Tab Take 5 mg by mouth every day.     • OMEPRAZOLE PO Take 40 mg by mouth.     • atorvastatin (LIPITOR) 20 MG Tab Take 20 mg by mouth every evening.       No current facility-administered medications for this visit.      Review of Systems   Constitutional: Negative for chills, diaphoresis and fever.  "  Respiratory: Negative for cough, shortness of breath and wheezing.    Cardiovascular: Negative for chest pain, palpitations, orthopnea, claudication, leg swelling and PND.   Gastrointestinal: Negative for abdominal pain and blood in stool.   Genitourinary: Negative for hematuria.   Skin:        Left upper Forearm pain   Neurological: Negative for dizziness and loss of consciousness.     All others systems reviewed and negative.   Objective:     /70 (BP Location: Right arm, Patient Position: Sitting, BP Cuff Size: Large adult)   Pulse 85   Ht 1.753 m (5' 9\")   Wt 98 kg (216 lb)   SpO2 99%  Body mass index is 31.9 kg/m².    Physical Exam   Constitutional: He is oriented to person, place, and time. No distress.   Neck: No JVD present.   Cardiovascular: Normal rate and regular rhythm.   Pulses:       Radial pulses are 2+ on the right side and 2+ on the left side.   Pulmonary/Chest: Effort normal and breath sounds normal. No respiratory distress. He has no wheezes. He has no rales. He exhibits no tenderness.   Musculoskeletal:         General: No edema.   Neurological: He is alert and oriented to person, place, and time.   Skin: Skin is warm and dry. He is not diaphoretic.   Left upper forearm mild-moderate bruising, resolving. No edema or erythema present. 2+ radial pulses. Mild tenderness to touch.    Psychiatric: Mood, memory, affect and judgment normal.   Nursing note and vitals reviewed.    Cardiac Imaging and Procedures Review:    Echocardiogram (07/28/2020):   Ascending aorta is dilated with a diameter of  4.5 cm.  Known TAVR aortic valve that is functioning normally with normal transvalvular gradients.  Transvalvular gradients are - Peak: 18 mmHg,  Mean: 10 mmHg. Mild paravalvular leak is noted.  Normal left ventricular systolic function.  Left ventricular ejection fraction is visually estimated to be 65%.  Normal regional wall motion.  Mild concentric left ventricular hypertrophy.  Normal left atrial " size.  No mitral regurgitation.    Labs (personally reviewed and notable for):   Lab Results   Component Value Date/Time    SODIUM 132 (L) 07/28/2020 03:53 AM    POTASSIUM 4.2 07/28/2020 03:53 AM    CHLORIDE 97 07/28/2020 03:53 AM    CO2 23 07/28/2020 03:53 AM    GLUCOSE 101 (H) 07/28/2020 03:53 AM    BUN 8 07/28/2020 03:53 AM    CREATININE 0.57 07/28/2020 03:53 AM    CREATININE 0.9 07/28/2008 01:10 PM      Lab Results   Component Value Date/Time    WBC 11.8 (H) 07/28/2020 03:53 AM    RBC 4.44 (L) 07/28/2020 03:53 AM    HEMOGLOBIN 11.7 (L) 07/28/2020 03:53 AM    HEMATOCRIT 37.4 (L) 07/28/2020 03:53 AM    MCV 84.2 07/28/2020 03:53 AM    MCH 26.4 (L) 07/28/2020 03:53 AM    MCHC 31.3 (L) 07/28/2020 03:53 AM    MPV 8.6 (L) 07/28/2020 03:53 AM    NEUTSPOLYS 67.90 07/21/2020 02:36 PM    LYMPHOCYTES 16.30 (L) 07/21/2020 02:36 PM    MONOCYTES 11.50 07/21/2020 02:36 PM    EOSINOPHILS 3.40 07/21/2020 02:36 PM    BASOPHILS 0.50 07/21/2020 02:36 PM      PT/INR:   Lab Results   Component Value Date/Time    PROTHROMBTM 13.7 07/21/2020 02:36 PM    INR 1.02 07/21/2020 02:36 PM   ]  Assessment:     1. Pain of right upper extremity  US-EXTREMITY ARTERY UPPER UNILAT LEFT   2. Nonrheumatic aortic valve stenosis     3. S/P TAVR (transcatheter aortic valve replacement)       Medical Decision Making:  Today's Assessment / Status / Plan:   1. Left upper extremity pain  - Reports persistent pain/discomfrot at left upper forearm, prior IV catheter site, mild-moderate bruising, soft.  2+ radial pulses bilaterally.   - BP and weight stable.  - Provided reassurance, likely bruising that will resolve overtime.   - Acetaminophen PRN, can alternative between warm and cool compresses Q 15 mins.   - Will check UE ultrasound if s/sx continue.   - Call office if persists or worsens.     2. S/P TAVR  - S/P TAVR on 07/28/20, Biotel in place.  - Post op echo showed TAVR aortic valve that is functioning normally with normal transvalvular gradients.  Transvalvular gradients are - Peak: 18 mmHg,  Mean: 10 mmHg. Mild paravalvular leak is noted.  - On plavix 75 mg daily x 3 months, continue ASA indefinitely.   - 1 month follow up appointment, Echo prior to f/u appt.     Plan reviewed in detail with the patient and he verbalizes understanding and is in agreement. RTC 1 month with Dr. Fontanez as scheduled with Echo prior, sooner if clinical condition changes.     Thank you for allowing me to participate in this patients care.  Please contact me with any questions or concerns.     ALEX Blanchard.   [facility]  [phone]    Collaborating MD/ADD: Dr. Frederick MD.

## 2020-08-05 NOTE — TELEPHONE ENCOUNTER
I called him.  He continues to c/o 5/10 pain in the cath site.  Any extension of the arm is painful and sometimes has been awakened from pain.  Since this has been ongoing an very uncomfortable for him, I have made an appt with an APN to check.

## 2020-08-05 NOTE — PATIENT INSTRUCTIONS
- Monitor site, can alternative between Ice and warm compresses.   - Call office if persists or worsens.   - Left upper extremity ultrasound if signs/symptoms continue.

## 2020-08-07 LAB — ACT BLD: 472 SEC (ref 74–137)

## 2020-08-11 DIAGNOSIS — I44.7 LBBB (LEFT BUNDLE BRANCH BLOCK): ICD-10-CM

## 2020-08-11 DIAGNOSIS — Z95.2 S/P TAVR (TRANSCATHETER AORTIC VALVE REPLACEMENT): ICD-10-CM

## 2020-08-24 ENCOUNTER — HOSPITAL ENCOUNTER (OUTPATIENT)
Dept: CARDIOLOGY | Facility: MEDICAL CENTER | Age: 85
End: 2020-08-24
Attending: INTERNAL MEDICINE
Payer: COMMERCIAL

## 2020-08-24 DIAGNOSIS — Z95.2 S/P TAVR (TRANSCATHETER AORTIC VALVE REPLACEMENT): ICD-10-CM

## 2020-08-24 LAB
LV EJECT FRACT  99904: 55
LV EJECT FRACT MOD 2C 99903: 52.84
LV EJECT FRACT MOD 4C 99902: 56.98
LV EJECT FRACT MOD BP 99901: 51.7

## 2020-08-24 PROCEDURE — 93306 TTE W/DOPPLER COMPLETE: CPT

## 2020-08-24 PROCEDURE — 93306 TTE W/DOPPLER COMPLETE: CPT | Mod: 26 | Performed by: INTERNAL MEDICINE

## 2020-09-02 ENCOUNTER — OFFICE VISIT (OUTPATIENT)
Dept: CARDIOLOGY | Facility: MEDICAL CENTER | Age: 85
End: 2020-09-02
Payer: COMMERCIAL

## 2020-09-02 VITALS
RESPIRATION RATE: 12 BRPM | BODY MASS INDEX: 32.32 KG/M2 | SYSTOLIC BLOOD PRESSURE: 128 MMHG | WEIGHT: 218.2 LBS | OXYGEN SATURATION: 95 % | HEIGHT: 69 IN | HEART RATE: 79 BPM | DIASTOLIC BLOOD PRESSURE: 80 MMHG

## 2020-09-02 DIAGNOSIS — Z95.2 S/P TAVR (TRANSCATHETER AORTIC VALVE REPLACEMENT): ICD-10-CM

## 2020-09-02 PROCEDURE — 99214 OFFICE O/P EST MOD 30 MIN: CPT | Performed by: INTERNAL MEDICINE

## 2020-09-02 ASSESSMENT — FIBROSIS 4 INDEX: FIB4 SCORE: 1.69

## 2020-09-02 NOTE — PROGRESS NOTES
"    Cardiology Initial Consultation Note    Date of note:    7/15/2020    Primary Care Provider: Corey Jamison M.D.  Referring Provider: No ref. provider found     Patient Name: Jamil Kendall     YOB: 1935  MRN:              [id number]    Chief Complaint: 1 month follow-up post TAVR    Jamil Kendall is a 85 y.o. male  patient referred by Dr. Wiseman for severe aortic stenosis, subsequently underwent transcatheter aortic valve replacement on 7/27/2020 with 29 mm Ramirez sapient 3 valve.  He is here for follow-up.  He feels well no chest pain, no shortness of breath no fainting spells etc.  His energy level is better compared to prior but he still has significant fatigue, unable to carry on his usual activities without much trouble.     ROS  Knee pain, hoping to get surgery.    All other systems reviewed and discussed using a comprehensive questionnaire and are negative.     Past medical history, family history, social history, allergies and labs are reviewed and updated as needed as documented below.    Past Medical History:   Diagnosis Date   • Arthritis     back   • Cancer (HCC)     basal cell   • Cataract     bilat IOL    • Cold 12/27/2016   • COPD (chronic obstructive pulmonary disease) (HCC)    • Coughing blood    • Emphysema of lung (HCC)     no O2 needed   • Heart burn     GERD   • Heart murmur    • Heart valve disease     \"leaky heart valve\"    • Hiatus hernia syndrome    • High cholesterol    • Hypertension    • Pain     back, right knees   • Pneumonia 2014   • Skin cancer 1990   • Sleep apnea     CPAP   • Snoring          Past Surgical History:   Procedure Laterality Date   • DEB  7/27/2020    Procedure: ECHOCARDIOGRAM, TRANSTHORACIC;  Surgeon: Rubén Fontanez M.D.;  Location: SURGERY [facility];  Service: Cardiac   • TRANSCATHETER AORTIC VALVE REPLACEMENT Left 7/27/2020    Procedure: REPLACEMENT, AORTIC VALVE, TRANSCATHETER - INCLUDING PERMANENT PACEMAKER " PLACEMENT AND CEREBRAL PROTECTION DEVICE;  Surgeon: Rubén Fontanez M.D.;  Location: SURGERY [facility];  Service: Cardiac   • EXPLORATORY LAPAROTOMY  9/21/2017    Procedure: Emergent Subtotal colectomy with ileostomy, mobilization of splenic flexure;  Surgeon: Jamil Wagner M.D.;  Location: SURGERY [facility];  Service: General   • LUMBAR LAMINECTOMY DISKECTOMY  2/1/2017    Procedure: LUMBAR LAMINECTOMY DISKECTOMY - L3-4, L4-5, L5-S1;  Surgeon: Shana Salamanca M.D.;  Location: SURGERY [facility];  Service:    • LUMBAR DECOMPRESSION  2/1/2017    Procedure: LUMBAR DECOMPRESSION;  Surgeon: Shana Salamanca M.D.;  Location: SURGERY [facility];  Service:    • FORAMINOTOMY Bilateral 2/1/2017    Procedure: FORAMINOTOMY;  Surgeon: Shana Salamanca M.D.;  Location: SURGERY [facility];  Service:    • DEBRIDEMENT  8/4/08    Performed by AG[facility]ITO CLAROS at [facility]   • HIP ARTHROSCOPY  8/4/08    Performed by AGAPITO CLAROS at [facility]   • ROTATOR CUFF REPAIR Right 1999   • ROTATOR CUFF REPAIR Left 1995   • MASTECTOMY Left 1965   • CATARACT EXTRACTION WITH IOL     • EGD ESOPHAGUS WITH ENDOSCOPIC US  1990s    with dilation    • TONSILLECTOMY      as child          Current Outpatient Medications   Medication Sig Dispense Refill   • metFORMIN (GLUCOPHAGE) 500 MG Tab Take 1 Tab by mouth 2 times a day, with meals. 60 Tab    • metoprolol SR (TOPROL XL) 25 MG TABLET SR 24 HR Take 1 Tab by mouth. 30 Tab    • aspirin EC 81 MG EC tablet Take 1 Tab by mouth every day. 30 Tab 8   • amLODIPine (NORVASC) 5 MG Tab Take 5 mg by mouth every day.     • OMEPRAZOLE PO Take 40 mg by mouth.     • atorvastatin (LIPITOR) 20 MG Tab Take 20 mg by mouth every evening.       No current facility-administered medications for this visit.          Allergies   Allergen Reactions   • Morphine Vomiting         No family history of bicuspid aortic valve disease    Social History     Socioeconomic  "History   • Marital status:      Spouse name: Not on file   • Number of children: Not on file   • Years of education: Not on file   • Highest education level: Not on file   Occupational History   • Not on file   Social Needs   • Financial resource strain: Not on file   • Food insecurity     Worry: Not on file     Inability: Not on file   • Transportation needs     Medical: Not on file     Non-medical: Not on file   Tobacco Use   • Smoking status: Former Smoker     Packs/day: 0.00     Years: 30.00     Pack years: 0.00     Types: Cigars     Quit date: 1989     Years since quittin.6   • Smokeless tobacco: Never Used   Substance and Sexual Activity   • Alcohol use: Yes     Frequency: 4 or more times a week     Drinks per session: 3 or 4     Binge frequency: Never     Comment: 3-4 scotch a night   • Drug use: No   • Sexual activity: Not on file   Lifestyle   • Physical activity     Days per week: Not on file     Minutes per session: Not on file   • Stress: Not on file   Relationships   • Social connections     Talks on phone: Not on file     Gets together: Not on file     Attends [employer] service: Not on file     Active member of club or organization: Not on file     Attends meetings of clubs or organizations: Not on file     Relationship status: Not on file   • Intimate partner violence     Fear of current or ex partner: Not on file     Emotionally abused: Not on file     Physically abused: Not on file     Forced sexual activity: Not on file   Other Topics Concern   • Not on file   Social History Narrative   • Not on file         Physical Exam:  Ambulatory Vitals  /80 (BP Location: Left arm, Patient Position: Sitting, BP Cuff Size: Adult)   Pulse 79   Resp 12   Ht 1.753 m (5' 9\")   Wt 99 kg (218 lb 3.2 oz)   SpO2 95%    Oxygen Therapy:  Pulse Oximetry: 95 %  BP Readings from Last 4 Encounters:   20 128/80   20 122/70   20 122/76   20 126/60       Weight/BMI: Body mass " index is 32.22 kg/m².  Wt Readings from Last 4 Encounters:   09/02/20 99 kg (218 lb 3.2 oz)   08/05/20 98 kg (216 lb)   07/30/20 99.8 kg (220 lb)   07/27/20 98.2 kg (216 lb 7.9 oz)           General: Well appearing and in no apparent distress  Head: atrumatic  Eyes: No conjunctival pallor   ENT: normal external appearance of nose and ears  Neck: JVD absent, carotid bruits absent  Lungs: respiratory sounds  normal, additional breath sounds absent  Heart: Regular rhythm,   No palpable thrills on palpation, no murmurs, no rubs,   Lower extremity edema absent.   Pedal pulses normal  Abdomen: soft, non tender, non distended.  Ileostomy bag  Extremities/MSK: no clubbing, no cyanosis  Neurological: normal orientation, Gait normal   Psychiatric: Appropriate affect, intact judgement and insight  Skin: Warm extremities        Lab Data Review:  Lab Results   Component Value Date/Time    TRIGLYCERIDE 153 (H) 09/23/2017 06:15 AM       Lab Results   Component Value Date/Time    SODIUM 132 (L) 07/28/2020 03:53 AM    POTASSIUM 4.2 07/28/2020 03:53 AM    CHLORIDE 97 07/28/2020 03:53 AM    CO2 23 07/28/2020 03:53 AM    GLUCOSE 101 (H) 07/28/2020 03:53 AM    BUN 8 07/28/2020 03:53 AM    CREATININE 0.57 07/28/2020 03:53 AM    CREATININE 0.9 07/28/2008 01:10 PM     Lab Results   Component Value Date/Time    ALKPHOSPHAT 56 07/28/2020 03:53 AM    ASTSGOT 21 07/28/2020 03:53 AM    ALTSGPT 26 07/28/2020 03:53 AM    TBILIRUBIN 0.5 07/28/2020 03:53 AM      Lab Results   Component Value Date/Time    WBC 11.8 (H) 07/28/2020 03:53 AM     EKG 3/3/20  Sinus rhythm, no bundle branch block  Carotid exam 6/19/2020 no significant carotid artery disease  PFTs, FEV1 2.05 L 68%    Echocardiogram March 2020:  Left ventricular ejection fraction 50%, aortic valve area 0.8 cm².  Velocity across the aortic valve is 3.59 cm/s, dimensionless index is 0.22.    Coronary angiogram 6/3/2020  Cardiac output 4.36, mean gradient across the aortic valve is 33, aortic  valve area 0.8  Normal coronary arteries, dominant RCA    Echo 20  CONCLUSIONS  Prior Echo - 20, no significant change  Left ventricular ejection fraction is visually estimated to be 55%.  Normal regional wall motion.  Known TAVR aortic valve that is functioning normally with normal   transvalvular gradients.  Vmax is  2 m/s. Transvalvular gradients are - Peak: 16  mmHg,  Mean: 9   mmHg. Mild paravalvular leak is noted    Echo 20:  CONCLUSIONS  Limited echocardiogram to evaluate aortic stenosis.   No prior study is available for comparison.   Severe aortic stenosis. Vmax is 4.2  m/s. Transvalvular gradients are -   Peak: 68 mmHg, Mean: 43 mmHg. Mild aortic insufficiency.    CTA 20:  IMPRESSION:   1.  Tricuspid calcified aortic valve annulus measures 615 mm?   2.  Coronary arteries originate at least 14 mm above the aortic annulus   3.  Moderate iliofemoral tortuosity with diameters measuring at least 8 mm. Either side would be suitable for access   4.  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  He feels well, stable from cardiac standpoint still complaining of low energy and fatigue, likely from deconditioning, advised to increase activity as tolerated.  [facility].  New evidence suggest no significant benefit from Plavix, we will stop Plavix.  He was noted to have possible right groin AV fistula from his coronary angiogram on the CT scan.  [facility] of right [facility]in, if still persist, need referral for vascular surgeon.  1 year follow-up with  echocardiogram.    This note was dictated using Dragon speech recognition software.    Rubén YODER  Interventional cardiologist  [facility], [facility]  [address] [address]  [place] [address]  Phone: [phone]  Fax: [phone]

## 2020-09-15 ENCOUNTER — DOCUMENTATION (OUTPATIENT)
Dept: CARDIOLOGY | Facility: MEDICAL CENTER | Age: 85
End: 2020-09-15

## 2020-09-15 NOTE — PROGRESS NOTES
Valve Program Functional Assessment:     KCCQ12   1a) Showering/bathin  1b) Walking 1 block on ground: 5  1c) Hurrying or joggin  2) Swellin  3) Fatigue: 1  4) Shortness of breath: 7  5) Sleep sitting up: 5  6) Limited enjoyment of life: 5  7) Spend the rest of your life with HF: 3  8a) Hobbies, recreational activities:1  8b) Working or doing household chores:2  8c) Visiting family or friends: 4    5 meter walk test declined r/t SOB       Strength   1) _30_____ kg  2) _30_____ kg  3) _30_____ kg    BOLAND ADLs  Patient independently preforms...   - Bathing: Yes   - Dressing: Yes   - Toileting: Yes   - Transferring: Yes   - Continence: Yes   - Feeding: Yes     Living Situation  Patient lives:  with spouse    Mobility Aids   Patient uses: cane

## 2020-10-09 ENCOUNTER — DOCUMENTATION (OUTPATIENT)
Dept: CARDIOLOGY | Facility: MEDICAL CENTER | Age: 85
End: 2020-10-09

## 2020-10-20 ENCOUNTER — TELEPHONE (OUTPATIENT)
Dept: CARDIOLOGY | Facility: MEDICAL CENTER | Age: 85
End: 2020-10-20

## 2020-10-20 NOTE — TELEPHONE ENCOUNTER
Returned call. Pt had a dental procedure today and took prophylactic abx, but they would like something from our office stating that pt needs prophylactic abx. Faxed 7/30/20 RT note mentioning need for abx to No at [phone]. Receipt confirmed.

## 2020-10-20 NOTE — TELEPHONE ENCOUNTER
CLAUDIA Sarabia called from Dr sanchez office, Dental provider. Pt had a procedure and did their pre meds, the  office would like to just have it on record. Please fax to [phone]. If you have any additional questions please call No back at [phone].    Thank you,  Chantell SANDOVAL

## 2020-11-24 ENCOUNTER — TELEPHONE (OUTPATIENT)
Dept: CARDIOLOGY | Facility: MEDICAL CENTER | Age: 85
End: 2020-11-24

## 2020-11-24 NOTE — LETTER
PROCEDURE/SURGERY CLEARANCE FORM      Encounter Date: 11/24/2020    Patient: Jamil Kendall  YOB: 1935    CARDIOLOGIST:  Rubén Fontanez MD   REFERRING DOCTOR:  Elena Nyhan, [facility]; [facility]       The above patient may proceed with orthopedic procedures as needed, based on a cardiac standpoint.           Rubén Fontanez MD   Electronically Signed

## 2020-11-24 NOTE — TELEPHONE ENCOUNTER
[facility]    TO: 2p/Tues/Ofc  NM: Jamil Kendall   PH: [phone]   PT NM: Jamil Kendall    : 37   REG DR: Dr Fontanez   RE: Needs a knee replacement with the  [facility], Requesting surgery clearance  to be mailed to him     DISP HIST: 2020 01:52P

## 2020-11-25 NOTE — TELEPHONE ENCOUNTER
Pt cleared for surgery in [facility] progress note. Pt would like copy mailed to him and a copy faxed to the [facility]. Called [facility] for fax number they say to call back tomorrow when they are open. 8-4p to get a fax number.

## 2020-11-25 NOTE — TELEPHONE ENCOUNTER
Clearance letter and progress note faxed to pts PCP on file which is a [facility] provider, Dr. Elena Nyhan. Faxed to [phone].    Copies mailed to pt.

## 2021-01-15 DIAGNOSIS — Z23 NEED FOR VACCINATION: ICD-10-CM

## 2021-12-15 ENCOUNTER — OFFICE VISIT (OUTPATIENT)
Dept: CARDIOLOGY | Facility: MEDICAL CENTER | Age: 86
End: 2021-12-15
Payer: COMMERCIAL

## 2021-12-15 ENCOUNTER — TELEPHONE (OUTPATIENT)
Dept: CARDIOLOGY | Facility: MEDICAL CENTER | Age: 86
End: 2021-12-15

## 2021-12-15 VITALS
HEART RATE: 107 BPM | SYSTOLIC BLOOD PRESSURE: 146 MMHG | DIASTOLIC BLOOD PRESSURE: 80 MMHG | WEIGHT: 210 LBS | OXYGEN SATURATION: 96 % | BODY MASS INDEX: 31.1 KG/M2 | RESPIRATION RATE: 16 BRPM | HEIGHT: 69 IN

## 2021-12-15 DIAGNOSIS — Z95.2 S/P TAVR (TRANSCATHETER AORTIC VALVE REPLACEMENT): ICD-10-CM

## 2021-12-15 PROCEDURE — 99214 OFFICE O/P EST MOD 30 MIN: CPT | Performed by: INTERNAL MEDICINE

## 2021-12-15 RX ORDER — METHYLPREDNISOLONE 4 MG/1
4 TABLET ORAL DAILY
COMMUNITY

## 2021-12-15 RX ORDER — MESALAMINE 1000 MG/1
1000 SUPPOSITORY RECTAL
COMMUNITY

## 2021-12-15 RX ORDER — LIDOCAINE 50 MG/G
1 PATCH TOPICAL EVERY 24 HOURS
COMMUNITY

## 2021-12-15 RX ORDER — METHOCARBAMOL 500 MG/1
1000 TABLET, FILM COATED ORAL PRN
COMMUNITY

## 2021-12-15 ASSESSMENT — FIBROSIS 4 INDEX: FIB4 SCORE: 1.71

## 2021-12-15 NOTE — PROGRESS NOTES
"      Cardiology Follow-up Consultation Note    Date of note:    12/15/2021    Primary Care Provider: Elena M Nyhan, M.D.    Name:             Jamil Kendall     YOB: 1935  MRN:               [id number]    CC: S/p TAVR, 1 year follow-up    Patient ID/HPI:   86-year-old male patient with history of severe aortic stenosis s/p TAVR, left bundle branch block here for 1 year follow-up.  He complains of fatigue but that has been stable.  Denies chest pain, shortness of breath..  No fainting spells        Past Medical History:   Diagnosis Date   • Arthritis     back   • Cancer (HCC)     basal cell   • Cataract     bilat IOL    • Cold 12/27/2016   • COPD (chronic obstructive pulmonary disease) (HCC)    • Coughing blood    • Emphysema of lung (HCC)     no O2 needed   • Heart burn     GERD   • Heart murmur    • Heart valve disease     \"leaky heart valve\"    • Hiatus hernia syndrome    • High cholesterol    • Hypertension    • Pain     back, right knees   • Pneumonia 2014   • Skin cancer 1990   • Sleep apnea     CPAP   • Snoring          Past Surgical History:   Procedure Laterality Date   • DEB  7/27/2020    Procedure: ECHOCARDIOGRAM, TRANSTHORACIC;  Surgeon: Rubén Fontanez M.D.;  Location: [facility];  Service: Cardiac   • TRANSCATHETER AORTIC VALVE REPLACEMENT Left 7/27/2020    Procedure: REPLACEMENT, AORTIC VALVE, TRANSCATHETER - INCLUDING PERMANENT PACEMAKER PLACEMENT AND CEREBRAL PROTECTION DEVICE;  Surgeon: Rubén Fontanez M.D.;  Location: [facility];  Service: Cardiac   • EXPLORATORY LAPAROTOMY  9/21/2017    Procedure: Emergent Subtotal colectomy with ileostomy, mobilization of splenic flexure;  Surgeon: Jamil Wagner M.D.;  Location: [facility];  Service: General   • LUMBAR LAMINECTOMY DISKECTOMY  2/1/2017    Procedure: LUMBAR LAMINECTOMY DISKECTOMY - L3-4, L4-5, L5-S1;  Surgeon: Shana Salamanca M.D.;  Location: [facility];  " "Service:    • LUMBAR DECOMPRESSION  2/1/2017    Procedure: LUMBAR DECOMPRESSION;  Surgeon: Shana Salamanca M.D.;  Location: SURGERY [facility];  Service:    • FORAMINOTOMY Bilateral 2/1/2017    Procedure: FORAMINOTOMY;  Surgeon: Shana Salamanca M.D.;  Location: SURGERY [facility];  Service:    • DEBRIDEMENT  8/4/08    Performed by AGAPITO CLAROS at SURGERY [facility]   • HIP ARTHROSCOPY  8/4/08    Performed by AGAPITO CLAROS at SURGERY [facility]   • ROTATOR CUFF REPAIR Right 1999   • ROTATOR CUFF REPAIR Left 1995   • MASTECTOMY Left 1965   • CATARACT EXTRACTION WITH IOL     • EGD ESOPHAGUS WITH ENDOSCOPIC US  1990s    with dilation    • TONSILLECTOMY      as child          Current Outpatient Medications   Medication Sig Dispense Refill   • lidocaine (LIDODERM) 5 % Patch Place 1 Patch on the skin every 24 hours.     • mesalamine (CANASA) 1000 MG Suppos Insert 1,000 mg into the rectum at bedtime.     • methocarbamol (ROBAXIN) 500 MG Tab Take 1,000 mg by mouth as needed.     • methylPREDNISolone (MEDROL) 4 MG Tab Take 4 mg by mouth every day.     • metFORMIN (GLUCOPHAGE) 500 MG Tab Take 1 Tab by mouth 2 times a day, with meals. 60 Tab    • aspirin EC 81 MG EC tablet Take 1 Tab by mouth every day. 30 Tab 8   • amLODIPine (NORVASC) 5 MG Tab Take 5 mg by mouth every day.     • OMEPRAZOLE PO Take 40 mg by mouth.     • atorvastatin (LIPITOR) 20 MG Tab Take 20 mg by mouth every evening.     • metoprolol SR (TOPROL XL) 25 MG TABLET SR 24 HR Take 1 Tab by mouth. 30 Tab      No current facility-administered medications for this visit.         Allergies   Allergen Reactions   • Morphine Vomiting         History reviewed. No pertinent family history.        Physical Exam:  Ambulatory Vitals  /80 (BP Location: Left arm, Patient Position: Sitting, BP Cuff Size: Adult)   Pulse (!) 107   Resp 16   Ht 1.753 m (5' 9\")   Wt 95.3 kg (210 lb)   SpO2 96%    Oxygen Therapy:  Pulse Oximetry: 96 %  BP " Readings from Last 4 Encounters:   12/15/21 146/80   09/02/20 128/80   08/05/20 122/70   07/30/20 122/76       Weight/BMI: Body mass index is 31.01 kg/m².  Wt Readings from Last 4 Encounters:   12/15/21 95.3 kg (210 lb)   09/02/20 99 kg (218 lb 3.2 oz)   08/05/20 98 kg (216 lb)   07/30/20 99.8 kg (220 lb)       General: Well appearing and in no apparent distress  Head: atrumatic  Eyes: No conjunctival pallor   ENT: normal external appearance of nose and ears  Neck: JVD absent, carotid bruits absent  Lungs: respiratory sounds  normal, additional breath sounds absent  Heart: Regular rhythm,   No palpable thrills on palpation, aortic flow murmur, no rubs,   Lower extremity edema absent.           Lab Data Review:  Lab Results   Component Value Date/Time    TRIGLYCERIDE 153 (H) 09/23/2017 06:15 AM       Lab Results   Component Value Date/Time    SODIUM 132 (L) 07/28/2020 03:53 AM    POTASSIUM 4.2 07/28/2020 03:53 AM    CHLORIDE 97 07/28/2020 03:53 AM    CO2 23 07/28/2020 03:53 AM    GLUCOSE 101 (H) 07/28/2020 03:53 AM    BUN 8 07/28/2020 03:53 AM    CREATININE 0.57 07/28/2020 03:53 AM    CREATININE 0.9 07/28/2008 01:10 PM     Lab Results   Component Value Date/Time    ALKPHOSPHAT 56 07/28/2020 03:53 AM    ASTSGOT 21 07/28/2020 03:53 AM    ALTSGPT 26 07/28/2020 03:53 AM    TBILIRUBIN 0.5 07/28/2020 03:53 AM      Lab Results   Component Value Date/Time    WBC 11.8 (H) 07/28/2020 03:53 AM     EKG 3/3/20  Sinus rhythm, no bundle branch block  Carotid exam 6/19/2020 no significant carotid artery disease  PFTs, FEV1 2.05 L 68%     Echocardiogram March 2020:  Left ventricular ejection fraction 50%, aortic valve area 0.8 cm².  Velocity across the aortic valve is 3.59 cm/s, dimensionless index is 0.22.     Coronary angiogram 6/3/2020  Cardiac output 4.36, mean gradient across the aortic valve is 33, aortic valve area 0.8  Normal coronary arteries, dominant RCA     Echo 8/24/20  CONCLUSIONS  Prior Echo - 7/28/20, no significant  change  Left ventricular ejection fraction is visually estimated to be 55%.  Normal regional wall motion.  Known TAVR aortic valve that is functioning normally with normal   transvalvular gradients.  Vmax is  2 m/s. Transvalvular gradients are - Peak: 16  mmHg,  Mean: 9   mmHg. Mild paravalvular leak is noted     Echo 20:  CONCLUSIONS  Limited echocardiogram to evaluate aortic stenosis.   No prior study is available for comparison.   Severe aortic stenosis. Vmax is 4.2  m/s. Transvalvular gradients are -   Peak: 68 mmHg, Mean: 43 mmHg. Mild aortic insufficiency.     CTA 20:  IMPRESSION:   1.  Tricuspid calcified aortic valve annulus measures 615 mm?   2.  Coronary arteries originate at least 14 mm above the aortic annulus   3.  Moderate iliofemoral tortuosity with diameters measuring at least 8 mm. Either side would be suitable for access   4.  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    He is stable from cardiovascular standpoint.  He will need 1 year follow-up echocardiogram.  [facility]  Continue current therapy with aspirin amlodipine, Lipitor, metoprolol.  He [facility]      Rubén YODER  Interventional cardiologist  [facility], [address]  [address] [address]  [place] [address]  Phone: [phone]  Fax: [phone]

## 2021-12-15 NOTE — TELEPHONE ENCOUNTER
Valve Program Functional Assessment: 1 year TAVR    KCCQ12   1a) Showering/bathin  1b) Walking 1 block on ground: 5  1c) Hurrying or jogging:3  2) Swellin  3) Fatigue: 1  4) Shortness of breath: 7  5) Sleep sitting up: 5  6) Limited enjoyment of life: 4  7) Spend the rest of your life with HF: 3  8a) Hobbies, recreational activities:3  8b) Working or doing household chores:2  8c) Visiting family or friends: 5    5 meter walk test  1) __5.83____ s/5m  2) __5.76____ s/5m  3) __5.74____ s/5m     Strength   1) _28_____ kg  2) _29_____ kg  3) _30_____ kg    BOLAND ADLs  Patient independently preforms...   - Bathing: Yes   - Dressing: Yes   - Toileting: Yes   - Transferring: Yes   - Continence: Yes   - Feeding: Yes     Living Situation  Patient lives: with spouse    Mobility Aids   Patient uses: cane

## 2022-01-04 ENCOUNTER — TELEPHONE (OUTPATIENT)
Dept: CARDIOLOGY | Facility: MEDICAL CENTER | Age: 87
End: 2022-01-04

## 2022-01-04 DIAGNOSIS — Z95.2 S/P TAVR (TRANSCATHETER AORTIC VALVE REPLACEMENT): ICD-10-CM

## 2022-01-04 NOTE — TELEPHONE ENCOUNTER
----- Message from Linden Alvarez Ass't sent at 1/4/2022  3:14 PM PST -----  Regarding: ECHO needed  Jesse Olivarez, Patient [facility]    Patient attached came in for their 1 year TAVR/TMVR with [facility] and an echo was not completed prior to that appt. Can you please place a new order so schedulers can contact patient to schedule for the new year 2022.    Thank you.  Yenny Cheatham Ass't

## 2022-02-11 ENCOUNTER — OFFICE VISIT (OUTPATIENT)
Dept: CARDIOLOGY | Facility: MEDICAL CENTER | Age: 87
End: 2022-02-11
Payer: COMMERCIAL

## 2022-02-11 ENCOUNTER — TELEPHONE (OUTPATIENT)
Dept: CARDIOLOGY | Facility: MEDICAL CENTER | Age: 87
End: 2022-02-11

## 2022-02-11 VITALS
DIASTOLIC BLOOD PRESSURE: 62 MMHG | HEART RATE: 88 BPM | SYSTOLIC BLOOD PRESSURE: 140 MMHG | RESPIRATION RATE: 16 BRPM | HEIGHT: 69 IN | BODY MASS INDEX: 30.96 KG/M2 | WEIGHT: 209 LBS | OXYGEN SATURATION: 96 %

## 2022-02-11 DIAGNOSIS — I10 PRIMARY HYPERTENSION: ICD-10-CM

## 2022-02-11 DIAGNOSIS — E78.49 OTHER HYPERLIPIDEMIA: ICD-10-CM

## 2022-02-11 DIAGNOSIS — I44.7 LBBB (LEFT BUNDLE BRANCH BLOCK): ICD-10-CM

## 2022-02-11 DIAGNOSIS — Z95.2 S/P TAVR (TRANSCATHETER AORTIC VALVE REPLACEMENT): ICD-10-CM

## 2022-02-11 PROBLEM — I35.0 NONRHEUMATIC AORTIC VALVE STENOSIS: Status: RESOLVED | Noted: 2020-07-27 | Resolved: 2022-02-11

## 2022-02-11 PROBLEM — E87.6 HYPOKALEMIA: Status: RESOLVED | Noted: 2017-09-22 | Resolved: 2022-02-11

## 2022-02-11 PROBLEM — K92.2 GI BLEED: Status: RESOLVED | Noted: 2017-09-21 | Resolved: 2022-02-11

## 2022-02-11 PROBLEM — J96.01 ACUTE HYPOXEMIC RESPIRATORY FAILURE (HCC): Status: RESOLVED | Noted: 2017-09-22 | Resolved: 2022-02-11

## 2022-02-11 PROBLEM — E83.42 HYPOMAGNESEMIA: Status: RESOLVED | Noted: 2017-09-22 | Resolved: 2022-02-11

## 2022-02-11 PROCEDURE — 99214 OFFICE O/P EST MOD 30 MIN: CPT | Performed by: NURSE PRACTITIONER

## 2022-02-11 ASSESSMENT — ENCOUNTER SYMPTOMS
ABDOMINAL PAIN: 0
FEVER: 0
PALPITATIONS: 0
MYALGIAS: 0
DIZZINESS: 0
SHORTNESS OF BREATH: 0
CLAUDICATION: 0
ORTHOPNEA: 0
COUGH: 0
PND: 0

## 2022-02-11 ASSESSMENT — FIBROSIS 4 INDEX: FIB4 SCORE: 1.73

## 2022-02-11 NOTE — PROGRESS NOTES
"Chief Complaint   Patient presents with   • Aortic Stenosis     F/V Dx: Nonrheumatic aortic valve stenosis       Subjective     Jamil Kendall is a 87 y.o. male who presents today for 2 month follow up.    He is a patient of Dr. Fontanez in our office. Hx of TAVR in '20, HLD, HTN, and LBBB.    [facility]. [facility] but somehow got sent back to us by mistake.    His BP is a little elevated, he thinks his PCP made adjustments yesterday.    He uses a cane for balance. He walks daily up hills for exercise, no concerns.    He has no chest pain, shortness of breath, edema, dizziness/lightheadedness, or palpitations.    Past Medical History:   Diagnosis Date   • Arthritis     back   • Cancer (HCC)     basal cell   • Cataract     bilat IOL    • Cold 12/27/2016   • COPD (chronic obstructive pulmonary disease) (HCC)    • Coughing blood    • Emphysema of lung (HCC)     no O2 needed   • Heart burn     GERD   • Heart murmur    • Heart valve disease     \"leaky heart valve\"    • Hiatus hernia syndrome    • High cholesterol    • Hypertension    • Pain     back, right knees   • Pneumonia 2014   • Skin cancer 1990   • Sleep apnea     CPAP   • Snoring      Past Surgical History:   Procedure Laterality Date   • DEB  7/27/2020    Procedure: ECHOCARDIOGRAM, TRANSTHORACIC;  Surgeon: Rubén Fontanez M.D.;  Location: [facility];  Service: Cardiac   • TRANSCATHETER AORTIC VALVE REPLACEMENT Left 7/27/2020    Procedure: REPLACEMENT, AORTIC VALVE, TRANSCATHETER - INCLUDING PERMANENT PACEMAKER PLACEMENT AND CEREBRAL PROTECTION DEVICE;  Surgeon: Rubén Fontanez M.D.;  Location: [facility];  Service: Cardiac   • EXPLORATORY LAPAROTOMY  9/21/2017    Procedure: Emergent Subtotal colectomy with ileostomy, mobilization of splenic flexure;  Surgeon: Jamil Wagner M.D.;  Location: [facility];  Service: General   • LUMBAR LAMINECTOMY " DISKECTOMY  2017    Procedure: LUMBAR LAMINECTOMY DISKECTOMY - L3-4, L4-5, L5-S1;  Surgeon: Shana Salamanca M.D.;  Location: SUR[facility]ERY [facility];  Service:    • LUMBAR DECOMPRESSION  2017    Procedure: LUMBAR DECOMPRESSION;  Surgeon: Shana Salamanca M.D.;  Location: SURGERY [facility];  Service:    • FORAMINOTOMY Bilateral 2017    Procedure: FORAMINOTOMY;  Surgeon: Shana Salamanca M.D.;  Location: SURGERY [facility];  Service:    • DEBRIDEMENT  08    Performed by AGAPITO CLAROS at SURGERY [facility]   • HIP ARTHROSCOPY  08    Performed by AGAPITO CLAROS at [facility]   • ROTATOR CUFF REPAIR Right    • ROTATOR CUFF REPAIR Left    • MASTECTOMY Left    • CATARACT EXTRACTION WITH IOL     • EGD ESOPHAGUS WITH ENDOSCOPIC US  1990s    with dilation    • TONSILLECTOMY      as child      History reviewed. No pertinent family history.  Social History     Socioeconomic History   • Marital status:      Spouse name: Not on file   • Number of children: Not on file   • Years of education: Not on file   • Highest education level: Not on file   Occupational History   • Not on file   Tobacco Use   • Smoking status: Former Smoker     Packs/day: 0.00     Years: 30.00     Pack years: 0.00     Types: Cigars     Quit date: 1989     Years since quittin.1   • Smokeless tobacco: Never Used   Vaping Use   • Vaping Use: Never used   Substance and Sexual Activity   • Alcohol use: Yes     Comment: 3-4 scotch a night   • Drug use: No   • Sexual activity: Not on file   Other Topics Concern   • Not on file   Social History Narrative   • Not on file     Social Determinants of Health     Financial Resource Strain:    • Difficulty of Paying Living Expenses: Not on file   Food Insecurity:    • Worried About Running Out of Food in the Last Year: Not on file   • Ran Out of Food in the Last Year: Not on file   Transportation Needs:    • Lack of Transportation  (Medical): Not on file   • Lack of Transportation (Non-Medical): Not on file   Physical Activity:    • Days of Exercise per Week: Not on file   • Minutes of Exercise per Session: Not on file   Stress:    • Feeling of Stress : Not on file   Social Connections:    • Frequency of Communication with Friends and Family: Not on file   • Frequency of Social Gatherings with Friends and Family: Not on file   • Attends Latter day Services: Not on file   • Active Member of Clubs or Organizations: Not on file   • Attends Club or Organization Meetings: Not on file   • Marital Status: Not on file   Intimate Partner Violence:    • Fear of Current or Ex-Partner: Not on file   • Emotionally Abused: Not on file   • Physically Abused: Not on file   • Sexually Abused: Not on file   Housing Stability:    • Unable to Pay for Housing in the Last Year: Not on file   • Number of Places Lived in the Last Year: Not on file   • Unstable Housing in the Last Year: Not on file     Allergies   Allergen Reactions   • Morphine Vomiting     Outpatient Encounter Medications as of 2/11/2022   Medication Sig Dispense Refill   • lidocaine (LIDODERM) 5 % Patch Place 1 Patch on the skin every 24 hours.     • mesalamine (CANASA) 1000 MG Suppos Insert 1,000 mg into the rectum at bedtime.     • methocarbamol (ROBAXIN) 500 MG Tab Take 1,000 mg by mouth as needed.     • methylPREDNISolone (MEDROL) 4 MG Tab Take 4 mg by mouth every day.     • metFORMIN (GLUCOPHAGE) 500 MG Tab Take 1 Tab by mouth 2 times a day, with meals. 60 Tab    • aspirin EC 81 MG EC tablet Take 1 Tab by mouth every day. 30 Tab 8   • amLODIPine (NORVASC) 5 MG Tab Take 5 mg by mouth every day.     • OMEPRAZOLE PO Take 40 mg by mouth.     • atorvastatin (LIPITOR) 20 MG Tab Take 20 mg by mouth every evening.     • metoprolol SR (TOPROL XL) 25 MG TABLET SR 24 HR Take 1 Tab by mouth. 30 Tab      No facility-administered encounter medications on file as of 2/11/2022.     Review of Systems  "  Constitutional: Negative for fever and malaise/fatigue.   Respiratory: Negative for cough and shortness of breath.    Cardiovascular: Negative for chest pain, palpitations, orthopnea, claudication, leg swelling and PND.   Gastrointestinal: Negative for abdominal pain.   Musculoskeletal: Negative for myalgias.   Neurological: Negative for dizziness.              Objective     /62 (BP Location: Left arm, Patient Position: Sitting, BP Cuff Size: Adult)   Pulse 88   Resp 16   Ht 1.753 m (5' 9\")   Wt 94.8 kg (209 lb)   SpO2 96%   BMI 30.86 kg/m²     Physical Exam  Vitals and nursing note reviewed.   Constitutional:       Appearance: Normal appearance. He is well-developed. He is obese.   HENT:      Head: Normocephalic and atraumatic.   Neck:      Vascular: No JVD.   Cardiovascular:      Rate and Rhythm: Normal rate and regular rhythm.      Pulses: Normal pulses.      Heart sounds: Normal heart sounds.   Pulmonary:      Effort: Pulmonary effort is normal.      Breath sounds: Normal breath sounds.   Musculoskeletal:         General: Normal range of motion.   Skin:     General: Skin is warm and dry.      Capillary Refill: Capillary refill takes less than 2 seconds.   Neurological:      General: No focal deficit present.      Mental Status: He is alert and oriented to person, place, and time. Mental status is at baseline.   Psychiatric:         Mood and Affect: Mood normal.         Behavior: Behavior normal.         Thought Content: Thought content normal.         Judgment: Judgment normal.                Assessment & Plan     1. LBBB (left bundle branch block)     2. S/P TAVR (transcatheter aortic valve replacement)     3. Primary hypertension     4. Other hyperlipidemia       Medical Decision Making: Today's Assessment/Status/Plan:       1. S/P TAVR  -doing well post-op  -cont asa lifelong  -SBE prophylaxis understands  -[facility] (will [facility] for review)  -moving " forward, annual echocardiogram and [facility] appointments per patient request, if in the future the TAVR starts to fail-refer back to our office for review    2. HTN  -moderate control on toprol, amlodipine  -recommend increase amlodipine to 10 mg   -BP goal <130/80  -follow with PCP    3. HLD  -statin  -LDL goal <100  -follow with PCP    4. LBBB  -stable, no changes  -follow clinically    Patient is to [facility] with [facility] with [facility] and labs.

## 2022-02-12 NOTE — PATIENT INSTRUCTIONS
[facility] with [facility].    I would recommend to increase the amlodipine to 10 mg once a day and continue the metoprolol at 25 mg once a day.    BP goal <130/80 consistently  HR goal 60-90 at rest    You will continue with [facility] follow ups at the [facility] with annual echocardiograms for your history of TAVR.    If anything is to go array and you need our services again, we are here for you.

## 2022-02-18 ENCOUNTER — TELEPHONE (OUTPATIENT)
Dept: CARDIOLOGY | Facility: MEDICAL CENTER | Age: 87
End: 2022-02-18
Payer: COMMERCIAL

## 2022-02-18 DIAGNOSIS — I10 PRIMARY HYPERTENSION: ICD-10-CM

## 2022-02-18 RX ORDER — AMLODIPINE BESYLATE 10 MG/1
10 TABLET ORAL DAILY
Qty: 90 TABLET | Refills: 0 | Status: SHIPPED | OUTPATIENT
Start: 2022-02-18 | End: 2022-02-23 | Stop reason: SDUPTHER

## 2022-02-18 NOTE — TELEPHONE ENCOUNTER
MAUREEN Hodge,    This patient called and stated his last visit with SC that she was sending a higher dose for amLODIPine (NORVASC) 5 MG Tab to the [facility], but the [facility] pharmacy has stated they haven't received it as of yet. Can you please call him once sent to that pharmacy at [phone].      Thank you,    JOANNA

## 2022-02-18 NOTE — TELEPHONE ENCOUNTER
90 day supply sent to [facility] pharmacy. Left message for patient with update and advised that further refills would need to come from [facility] cardiologist.

## 2022-02-23 DIAGNOSIS — I10 PRIMARY HYPERTENSION: ICD-10-CM

## 2022-02-23 RX ORDER — AMLODIPINE BESYLATE 10 MG/1
10 TABLET ORAL DAILY
Qty: 90 TABLET | Refills: 0 | Status: SHIPPED | OUTPATIENT
Start: 2022-02-23 | End: 2022-02-23 | Stop reason: SDUPTHER

## 2022-02-23 RX ORDER — AMLODIPINE BESYLATE 10 MG/1
10 TABLET ORAL DAILY
Qty: 90 TABLET | Refills: 0 | Status: SHIPPED | OUTPATIENT
Start: 2022-02-23

## 2022-02-23 NOTE — PROGRESS NOTES
Spoke to [facility] pharmacist regarding Amlodipine. RX not yet received by [facility], resent to updated pharmacy.

## 2025-05-05 NOTE — Clinical Note
REFERRAL APPROVAL NOTICE         Sent on May 5, 2025                   Jamil Kendall  [address] [place] [address]                   Dear Mr. Kendall,    After a careful review of the medical information and benefit coverage, [facility] has processed your referral. See below for additional details.    If applicable, you must be actively enrolled with your insurance for coverage of the authorized service. If you have any questions regarding your coverage, please contact your insurance directly.    REFERRAL INFORMATION   Referral #:  [id number]  Referred-To Department    Referred-By Provider:  Radiation Oncology    Deshaun VILLALOBOS M.D.   [facility]      [address] [address]  [place] [id number]  [phone] [address] [place] [address]  [place] [address]  [phone]    Referral Start Date:  05/02/2025  Referral End Date:   10/21/2025             SCHEDULING  If you do not already have an appointment, please call [phone] to make an appointment.     MORE INFORMATION  If you do not already have a [employer] account, sign up at: [email or web address]  You can access your medical information, make appointments, see lab results, billing information, and more.  If you have questions regarding this referral, please contact  the [facility] Referrals department at:             [phone]. Monday - Friday 8:00AM - 5:00PM.     Sincerely,    [facility]

## 2025-05-07 ENCOUNTER — PATIENT OUTREACH (OUTPATIENT)
Dept: ONCOLOGY | Facility: MEDICAL CENTER | Age: 89
End: 2025-05-07
Payer: COMMERCIAL

## 2025-05-09 ENCOUNTER — PATIENT OUTREACH (OUTPATIENT)
Dept: ONCOLOGY | Facility: MEDICAL CENTER | Age: 89
End: 2025-05-09
Payer: COMMERCIAL

## 2025-05-09 NOTE — PROGRESS NOTES
Cancer Nurse Navigation Assessment:        Assessment/Discussion: Call placed to patient, introduced self and explained role of navigator.    TRANSPORTATION: States that he drives himself and if needed has family available to assist him.  FINANCIAL:  NA  SUPPORT SYSTEM:  Family available  PSYCHOLOGICAL: intact                                                  DST:     Communication preference: Phone Call    Resources Provided/Intervention: Sent business card in the [employer] mail

## 2025-05-16 ENCOUNTER — HOSPITAL ENCOUNTER (OUTPATIENT)
Dept: RADIATION ONCOLOGY | Facility: MEDICAL CENTER | Age: 89
End: 2025-05-16
Attending: RADIOLOGY
Payer: COMMERCIAL

## 2025-05-16 VITALS
BODY MASS INDEX: 30.97 KG/M2 | SYSTOLIC BLOOD PRESSURE: 165 MMHG | HEIGHT: 68 IN | RESPIRATION RATE: 16 BRPM | DIASTOLIC BLOOD PRESSURE: 64 MMHG | OXYGEN SATURATION: 96 % | WEIGHT: 204.37 LBS | HEART RATE: 75 BPM

## 2025-05-16 PROCEDURE — 99205 OFFICE O/P NEW HI 60 MIN: CPT | Performed by: RADIOLOGY

## 2025-05-16 PROCEDURE — 99214 OFFICE O/P EST MOD 30 MIN: CPT | Performed by: RADIOLOGY

## 2025-05-16 RX ORDER — OMEGA-3 FATTY ACIDS/FISH OIL 300-1000MG
CAPSULE ORAL
COMMUNITY

## 2025-05-16 RX ORDER — VITAMIN B COMPLEX
2000 TABLET ORAL DAILY
COMMUNITY

## 2025-05-16 RX ORDER — TRIAMCINOLONE ACETONIDE 1 MG/G
CREAM TOPICAL 2 TIMES DAILY
COMMUNITY

## 2025-05-16 RX ORDER — B-COMPLEX WITH VITAMIN C
1 TABLET ORAL DAILY
COMMUNITY

## 2025-05-16 ASSESSMENT — PAIN SCALES - GENERAL: PAINLEVEL_OUTOF10: NO PAIN

## 2025-05-16 NOTE — CONSULTS
"RADIATION ONCOLOGY CONSULT    Patient name:  Jamil Kendall    Primary Physician:  Genesis Madrigal M.D. MRN: [id number]  CSN: [id number]   Referring physician:  Christiano Clement N.P.  : 1935, 89 y.o.     DATE OF SERVICE: 2025    IDENTIFICATION: A 89 y.o. male with basal cell cancer involving the skin above the right lip    He is here at the kind request of Christiano Gonzalez N.P.        HISTORY OF PRESENT ILLNESS:  Subjective     Patient states he has had a small growth over his right lip for approximately 2 years.  Recently biopsied and noted to be basal cell carcinoma.  He has had multiple Mohs surgeries in the past and was interested in radiotherapy option thinking it would be more expedient.        PROBLEM LIST:  Problem List[1]     PAST SURGICAL HISTORY:  Past Surgical History[2]    CURRENT MEDICATIONS:  Current Medications[3]    ALLERGIES:    Morphine    FAMILY HISTORY:    family history includes Cancer in his daughter, father, mother, and another family member.    SOCIAL HISTORY:     reports that he has quit smoking. His smoking use included cigars and pipe. He has never used smokeless tobacco. He reports current alcohol use. He reports that he does not use drugs.  Patient is  and lives alone in [place]. He is retired.     REVIEW OF SYSTEMS:    A complete review of systems taken. Pertinent items in HPI. All others negative.    PHYSICAL EXAM:    PERFORMANCE STATUS:      2025    10:53 AM   ECOG Performance Review   ECOG Performance Status Fully active, able to carry on all pre-disease performance without restriction         2025    10:53 AM   Karnofsky Score   Karnofsky Score 100     BP (!) 165/64 (BP Location: Right arm, Patient Position: Sitting)   Pulse 75   Resp 16   Ht 1.727 m (5' 8\")   Wt 92.7 kg (204 lb 5.9 oz)   SpO2 96%   BMI 31.07 kg/m²   Physical Exam  Vitals and nursing note reviewed.   Constitutional:       General: He is not in acute distress.     Appearance: He " is well-developed.   HENT:      Head: Normocephalic.   Skin:     General: Skin is warm and dry.      Findings: Lesion (1 cm small nodule without overlying skin changes located just above the right lip area) present. No erythema.   Neurological:      Mental Status: He is alert and oriented to person, place, and time.   Psychiatric:         Behavior: Behavior normal.         Thought Content: Thought content normal.         Judgment: Judgment normal.          LABORATORY DATA:   Lab Results   Component Value Date/Time    WBC 11.8 (H) 07/28/2020 03:53 AM    RBC 4.44 (L) 07/28/2020 03:53 AM    HEMOGLOBIN 11.7 (L) 07/28/2020 03:53 AM    HEMATOCRIT 37.4 (L) 07/28/2020 03:53 AM    MCV 84.2 07/28/2020 03:53 AM    MCH 26.4 (L) 07/28/2020 03:53 AM    MCHC 31.3 (L) 07/28/2020 03:53 AM    RDW 50.3 (H) 07/28/2020 03:53 AM    PLATELETCT 205 07/28/2020 03:53 AM    MPV 8.6 (L) 07/28/2020 03:53 AM    NEUTSPOLYS 67.90 07/21/2020 02:36 PM    LYMPHOCYTES 16.30 (L) 07/21/2020 02:36 PM    MONOCYTES 11.50 07/21/2020 02:36 PM    EOSINOPHILS 3.40 07/21/2020 02:36 PM    BASOPHILS 0.50 07/21/2020 02:36 PM      Lab Results   Component Value Date/Time    SODIUM 132 (L) 07/28/2020 03:53 AM    POTASSIUM 4.2 07/28/2020 03:53 AM    CHLORIDE 97 07/28/2020 03:53 AM    CO2 23 07/28/2020 03:53 AM    GLUCOSE 101 (H) 07/28/2020 03:53 AM    BUN 8 07/28/2020 03:53 AM    CREATININE 0.57 07/28/2020 03:53 AM    CREATININE 0.9 07/28/2008 01:10 PM           RADIOLOGY DATA:  No results found.    IMPRESSION:    A 89 y.o. with facial basal cell carcinoma          RECOMMENDATIONS:   Reviewed with patient the technical aspects benefits risks associate with electron-beam radiotherapy.  Given this is a facial area with concern for cosmesis, recommended 20-25 daily fractions with electron beam radiotherapy.  Patient states that he thought it could be done with a single treatment and is reconsidering his options at this point.  He is leaning towards observation given his  age of 89 with consideration of treatment only if there was progression.  Seems reasonable.  [facility] and [facility] with his primary care.      Thank you for the opportunity to participate in his care.  If any questions or comments, please do not hesitate in calling.    Orders Placed This Encounter    vitamin D3 (CHOLECALCIFEROL) 1000 Unit (25 mcg) Tab    Omega 3 1000 MG Cap    Diclofenac Sodium (DICLO GEL EX)    triamcinolone acetonide (KENALOG) 0.1 % Cream    Misc Natural Products (GLUCOSAMINE CHOND CMP ADVANCED PO)    B Complex-C (VITAMIN B + C COMPLEX) Tab                [1]   Patient Active Problem List  Diagnosis    S/P total colectomy    S/P ileostomy (HCC)    S/P TAVR (transcatheter aortic valve replacement)    LBBB (left bundle branch block)    Primary hypertension    Other hyperlipidemia   [2]   Past Surgical History:  Procedure Laterality Date    DEB  07/27/2020    Procedure: ECHOCARDIOGRAM, TRANSTHORACIC;  Surgeon: Rubén Fontanez M.D.;  Location: [facility];  Service: Cardiac    TRANSCATHETER AORTIC VALVE REPLACEMENT Left 07/27/2020    Procedure: REPLACEMENT, AORTIC VALVE, TRANSCATHETER - INCLUDING PERMANENT PACEMAKER PLACEMENT AND CEREBRAL PROTECTION DEVICE;  Surgeon: Rubén Fontanez M.D.;  Location: [facility];  Service: Cardiac    EXPLORATORY LAPAROTOMY  09/21/2017    Procedure: Emergent Subtotal colectomy with ileostomy, mobilization of splenic flexure;  Surgeon: Jamil Wagner M.D.;  Location: [facility];  Service: General    COLECTOMY  09/21/2017    LUMBAR LAMINECTOMY DISKECTOMY  02/01/2017    Procedure: LUMBAR LAMINECTOMY DISKECTOMY - L3-4, L4-5, L5-S1;  Surgeon: Shana Salamanca M.D.;  Location: [facility];  Service:     LUMBAR DECOMPRESSION  02/01/2017    Procedure: LUMBAR DECOMPRESSION;  Surgeon: Shana Salamanca M.D.;  Location: [facility];  Service:     FORAMINOTOMY Bilateral 02/01/2017    Procedure:  FORAMINOTOMY;  Surgeon: Shana Salamanca M.D.;  Location: SURGERY [facility];  Service:     DEBRIDEMENT  08/04/2008    Performed by AGAPITO CLAROS at SURGERY [facility]    HIP ARTHROSCOPY  08/04/2008    Performed by AGAPITO CLAROS at SURGERY [facility]    ROTATOR CUFF REPAIR Right 1999    ROTATOR CUFF REPAIR Left 1995    MASTECTOMY Left 1965    CATARACT EXTRACTION WITH IOL      EGD ESOPHAGUS WITH ENDOSCOPIC US  1990s    with dilation     TONSILLECTOMY      as child    [3]   Current Outpatient Medications   Medication Sig Dispense Refill    vitamin D3 (CHOLECALCIFEROL) 1000 Unit (25 mcg) Tab Take 2,000 Units by mouth every day.      Omega 3 1000 MG Cap Take  by mouth.      Diclofenac Sodium (DICLO GEL EX) Apply  topically.      triamcinolone acetonide (KENALOG) 0.1 % Cream Apply  topically 2 times a day.      Misc Natural Products (GLUCOSAMINE CHOND CMP ADVANCED PO) Take  by mouth.      B Complex-C (VITAMIN B + C COMPLEX) Tab Take 1 Tablet by mouth every day.      amLODIPine (NORVASC) 10 MG Tab Take 1 Tablet by mouth every day. 90 Tablet 0    lidocaine (LIDODERM) 5 % Patch Place 1 Patch on the skin every 24 hours.      mesalamine (CANASA) 1000 MG Suppos Insert 1,000 mg into the rectum at bedtime. (Patient not taking: Reported on 5/16/2025)      methocarbamol (ROBAXIN) 500 MG Tab Take 1,000 mg by mouth as needed. (Patient not taking: Reported on 5/16/2025)      methylPREDNISolone (MEDROL) 4 MG Tab Take 4 mg by mouth every day. (Patient not taking: Reported on 5/16/2025)      metFORMIN (GLUCOPHAGE) 500 MG Tab Take 1 Tab by mouth 2 times a day, with meals. (Patient not taking: Reported on 5/16/2025) 60 Tab     metoprolol SR (TOPROL XL) 25 MG TABLET SR 24 HR Take 1 Tab by mouth. (Patient not taking: Reported on 5/16/2025) 30 Tab     aspirin EC 81 MG EC tablet Take 1 Tab by mouth every day. (Patient not taking: Reported on 5/16/2025) 30 Tab 8    OMEPRAZOLE PO Take 40 mg by mouth.      atorvastatin  (LIPITOR) 20 MG Tab Take 20 mg by mouth every evening. (Patient not taking: Reported on 5/16/2025)       No current facility-administered medications for this encounter.

## 2025-05-16 NOTE — PROGRESS NOTES
"Patient was seen today in clinic with Dr. Ramirez for consult.  Vitals signs and weight were obtained and pain assessment was completed.  Allergies and medications were reviewed with the patient.       Vitals/Pain:  Vitals:    05/16/25 1037   BP: (!) 165/64   BP Location: Right arm   Patient Position: Sitting   Pulse: 75   Resp: 16   SpO2: 96%   Weight: 92.7 kg (204 lb 5.9 oz)   Height: 1.727 m (5' 8\")   Pain Score: No pain        Allergies:   Morphine    Current Medications:  Current Medications[1]      PCP:  Rohan Juarez R.N.       [1]   Current Outpatient Medications   Medication Sig Dispense Refill    vitamin D3 (CHOLECALCIFEROL) 1000 Unit (25 mcg) Tab Take 2,000 Units by mouth every day.      Omega 3 1000 MG Cap Take  by mouth.      Diclofenac Sodium (DICLO GEL EX) Apply  topically.      triamcinolone acetonide (KENALOG) 0.1 % Cream Apply  topically 2 times a day.      Misc Natural Products (GLUCOSAMINE CHOND CMP ADVANCED PO) Take  by mouth.      B Complex-C (VITAMIN B + C COMPLEX) Tab Take 1 Tablet by mouth every day.      amLODIPine (NORVASC) 10 MG Tab Take 1 Tablet by mouth every day. 90 Tablet 0    lidocaine (LIDODERM) 5 % Patch Place 1 Patch on the skin every 24 hours.      mesalamine (CANASA) 1000 MG Suppos Insert 1,000 mg into the rectum at bedtime. (Patient not taking: Reported on 5/16/2025)      methocarbamol (ROBAXIN) 500 MG Tab Take 1,000 mg by mouth as needed. (Patient not taking: Reported on 5/16/2025)      methylPREDNISolone (MEDROL) 4 MG Tab Take 4 mg by mouth every day. (Patient not taking: Reported on 5/16/2025)      metFORMIN (GLUCOPHAGE) 500 MG Tab Take 1 Tab by mouth 2 times a day, with meals. (Patient not taking: Reported on 5/16/2025) 60 Tab     metoprolol SR (TOPROL XL) 25 MG TABLET SR 24 HR Take 1 Tab by mouth. (Patient not taking: Reported on 5/16/2025) 30 Tab     aspirin EC 81 MG EC tablet Take 1 Tab by mouth every day. (Patient not taking: Reported on 5/16/2025) 30 " Tab 8    OMEPRAZOLE PO Take 40 mg by mouth.      atorvastatin (LIPITOR) 20 MG Tab Take 20 mg by mouth every evening. (Patient not taking: Reported on 5/16/2025)       No current facility-administered medications for this encounter.

## 2025-05-21 ENCOUNTER — PATIENT OUTREACH (OUTPATIENT)
Dept: ONCOLOGY | Facility: MEDICAL CENTER | Age: 89
End: 2025-05-21
Payer: COMMERCIAL

## (undated) DEVICE — ARMREST CRADLE FOAM - (2PR/PK 12PR/CA)

## (undated) DEVICE — SUTURE 4-0 VICRYL PLUS SH - UNDYED 27 INCH (36PK/BX)

## (undated) DEVICE — ARMBOARD  SMALL IV 9 INLONG - (25EA/CA)

## (undated) DEVICE — SUCTION INSTRUMENT YANKAUER BULBOUS TIP W/O VENT (50EA/CA)

## (undated) DEVICE — MIDAS LUBRICATOR DIFFUSER PACK (4EA/CA)

## (undated) DEVICE — WIRE GUIDE LUNDQST.035X180 - TSMG-35-180-4-LES ORDER BY BOX (5EA/BX)

## (undated) DEVICE — TOOL DISSECT MATCH HEAD

## (undated) DEVICE — DERMABOND ADVANCED - (12EA/BX)

## (undated) DEVICE — BLADE SURGICAL CLIPPER - (50EA/CA)

## (undated) DEVICE — SUCTION INSTRUMENT YANKAUER OPEN TIP W/O VENT (50EA/CA)

## (undated) DEVICE — WIRE GUIDE AES .035 260CM WITH 3MM J TIP"

## (undated) DEVICE — SUTURE GENERAL

## (undated) DEVICE — GOWN WARMING STANDARD FLEX - (30/CA)

## (undated) DEVICE — SUTURE OHS

## (undated) DEVICE — PACK TAVR (3EA/CA)

## (undated) DEVICE — ELECTRODE DUAL RETURN W/ CORD - (50/PK)

## (undated) DEVICE — TUBE CONNECT SUCTION CLEAR 120 X 1/4" (50EA/CA)"

## (undated) DEVICE — SEALER BIPOLAR 2.3 AQUAMANTYS

## (undated) DEVICE — SENSOR SPO2 NEO LNCS ADHESIVE (20/BX) SEE USER NOTES

## (undated) DEVICE — BLADE SURGICAL #15 - (50/BX 3BX/CA)

## (undated) DEVICE — SUTURE 2-0 VICRYL PLUS CT-1 - 8 X 18 INCH(12/BX)

## (undated) DEVICE — KIT ROOM DECONTAMINATION

## (undated) DEVICE — DRAPE LAPAROTOMY T SHEET - (12EA/CA)

## (undated) DEVICE — DEVICE CLOSER PERCLOSE - (10/BX) PROGLIDE SYSTEM

## (undated) DEVICE — SLEEVE, VASO, THIGH, MED

## (undated) DEVICE — KIT ANESTHESIA W/CIRCUIT & 3/LT BAG W/FILTER (20EA/CA)

## (undated) DEVICE — KIT TOURNIQUET DLP (40EA/PK)

## (undated) DEVICE — TUBE E-T HI-LO CUFF 8.0MM (10EA/PK)

## (undated) DEVICE — CABLE TEMPORARY PACING

## (undated) DEVICE — INTRODUCER SHEATH 6FR 2.5CM - DILATOR PROTRUDING (10/BX)

## (undated) DEVICE — TUBING C&T SET FLYING LEADS DRAIN TUBING (10EA/BX)

## (undated) DEVICE — SODIUM CHL IRRIGATION 0.9% 1000ML (12EA/CA)

## (undated) DEVICE — STOPCOCK IV 400 PSI 3W ROT (50EA/BX)

## (undated) DEVICE — PAD LAP STERILE 18 X 18 - (5/PK 40PK/CA)

## (undated) DEVICE — Device

## (undated) DEVICE — DRAPE MAYO STAND - (30/CA)

## (undated) DEVICE — GUIDEWIRE STARTER J CURVED FIXED CORE .035 260CM 10 3MM PTFE/HEPARIN COATED (5EA/BX)

## (undated) DEVICE — SYS DLV COST CLS RM TEMP - INJECTATE (CO-SET II) (10EA/CA)

## (undated) DEVICE — COVER FOOT UNIVERSAL DISP. - (25EA/CA)

## (undated) DEVICE — LACTATED RINGERS INJ 1000 ML - (14EA/CA 60CA/PF)

## (undated) DEVICE — PACK MAJOR BASIN - (2EA/CA)

## (undated) DEVICE — DEVICE INFLATION NOVAFLEX ATRION LOCK SYRINGE 29MM 38ML (1EA)

## (undated) DEVICE — SUTURE 2-0 COATED VICRYL PLUS - 12 X 18 INCH (12/BX)

## (undated) DEVICE — GLOVE BIOGEL INDICATOR SZ 8 SURGICAL PF LTX - (50/BX 4BX/CA)

## (undated) DEVICE — TUBE E-T HI-LO CUFF 7.5MM (10EA/PK)

## (undated) DEVICE — GLOVE BIOGEL INDICATOR SZ 7.5 SURGICAL PF LTX - (50PR/BX 4BX/CA)

## (undated) DEVICE — GUIDEWIRE STARTER STRAIGHT FIXED CORE .035 150CM 4 STRAIGHT PTFE/HEPARIN COATED (10/BX)

## (undated) DEVICE — ELECTRODE DFBR ADLT 6.5X5IN (12PR/CA) *8900-4003 PART # FOR CA QTY. PART #8900-4004 IS EACH QTY

## (undated) DEVICE — CONTAINER, SPECIMEN, STERILE

## (undated) DEVICE — KIT RADIAL ARTERY 20GA W/MAX BARRIER AND BIOPATCH  (5EA/CA) #10740 IS FOR THE SET RADIAL ARTERIAL

## (undated) DEVICE — GLOVE BIOGEL SZ 7.5 SURGICAL PF LTX - (50PR/BX 4BX/CA)

## (undated) DEVICE — CATHETER PIGTAIL 6FR 145 (5EA/BX)

## (undated) DEVICE — SET BIFURCATED BLOOD - (48EA/CS)

## (undated) DEVICE — DRESSING XEROFORM 1X8 - (50/BX 4BX/CA)

## (undated) DEVICE — HEADREST PRONEVIEW LARGE - (10/CA)

## (undated) DEVICE — MICRODRIP PRIMARY VENTED 60 (48EA/CA) THIS WAS PART #2C8428 WHICH WAS DISCONTINUED

## (undated) DEVICE — ELECTRODE 850 FOAM ADHESIVE - HYDROGEL RADIOTRNSPRNT (50/PK)

## (undated) DEVICE — CHLORAPREP 26 ML APPLICATOR - ORANGE TINT(25/CA)

## (undated) DEVICE — KIT EVACUATER 3 SPRING PVC LF 1/8 DRAIN SIZE (10EA/CA)"

## (undated) DEVICE — SUTURE 0 ETHIBOND MO6 C/R - (12/BX) 8-18 INCH ETHICON

## (undated) DEVICE — LIGASURE TISSUE FUSION  - SINGLE USE (6/CA)

## (undated) DEVICE — TUBING CLEARLINK DUO-VENT - C-FLO (48EA/CA)

## (undated) DEVICE — CANISTER SUCTION 3000ML MECHANICAL FILTER AUTO SHUTOFF MEDI-VAC NONSTERILE LF DISP  (40EA/CA)

## (undated) DEVICE — SUTURE 4-0 30CM STRATAFIX SPIRAL PS-2 (12EA/BX)

## (undated) DEVICE — BLADE SURGICAL #11 - (50/BX)

## (undated) DEVICE — SPONGE GAUZESTER 4 X 4 4PLY - (128PK/CA)

## (undated) DEVICE — MASK ANESTHESIA ADULT  - (100/CA)

## (undated) DEVICE — SET EXTENSION WITH 2 PORTS (48EA/CA) ***PART #2C8610 IS A SUBSTITUTE*****

## (undated) DEVICE — PACK NEURO - (2EA/CA)

## (undated) DEVICE — SET LEADWIRE 5 LEAD BEDSIDE DISPOSABLE ECG (1SET OF 5/EA)

## (undated) DEVICE — BLANKET UNDERBODY FULL ACCES - (5/CA)

## (undated) DEVICE — STAPLE 75MM LINEAR (12EA/BX)

## (undated) DEVICE — SET FLUID WARMING STANDARD FLOW - (10/CA)

## (undated) DEVICE — LACTATED RINGERS INJ. 500 ML - (24EA/CA)

## (undated) DEVICE — PENCIL ELECTSURG 10FT HLSTR - WITH BLADE (50EA/CA)

## (undated) DEVICE — COVER LIGHT HANDLE FLEXIBLE - SOFT (2EA/PK 80PK/CA)

## (undated) DEVICE — PROTECTOR ULNA NERVE - (36PR/CA)

## (undated) DEVICE — DRAPE SURG STERI-DRAPE 7X11OD - (40EA/CA)

## (undated) DEVICE — GLOVE BIOGEL SZ 8 SURGICAL PF LTX - (50PR/BX 4BX/CA)

## (undated) DEVICE — TUBING PRSS MNTR 72IN M/ M LL - (25/BX) MONIT. LINE W/MALE L/L

## (undated) DEVICE — SUTURE 3-0 VICRYL PLUS SH - 8X 18 INCH (12/BX)

## (undated) DEVICE — INTRODUCER CATHETER  DILATOR PROTRUDING 8FR 2.5CM (10EA/BX)

## (undated) DEVICE — SUTURE 5-0 PROLENE RB-1 D/A 36 (36PK/BX)"

## (undated) DEVICE — BAG RESUSCITATION DISPOSABLE - WITH MASK (10 EA/CA)

## (undated) DEVICE — KIT SURGIFLO W/OUT THROMBIN - (6EA/CA)

## (undated) DEVICE — CLIP SM INTNL HRZN TI ESCP LGT - (24EA/PK 25PK/BX)

## (undated) DEVICE — HEAD HOLDER JUNIOR/ADULT

## (undated) DEVICE — SUTURE 0 COATED VICRYL 6-18IN - (12PK/BX)

## (undated) DEVICE — SODIUM CHL. INJ. 0.9% 500ML (24EA/CA 50CA/PF)

## (undated) DEVICE — CLIP MED INTNL HRZN TI ESCP - (25/BX)

## (undated) DEVICE — DRAPE CLEAR W/ELASTIC BAND RAD CARM 40 X40" (20EA/CA)"

## (undated) DEVICE — PUMP ON-Q 270 DUAL 2ML FIXED RATE (5EA/CA)

## (undated) DEVICE — SUTURE 2-0 VICRYL PLUS CT-1 36 (36PK/BX)"

## (undated) DEVICE — DECANTER FLD BLS - (50/CA)

## (undated) DEVICE — TRANSDUCER BIFURCATED MONITORING KIT (10EA/CA)

## (undated) DEVICE — GOWN SURGEONS X-LARGE - DISP. (30/CA)

## (undated) DEVICE — STAPLER 75MM LINEAR OPEN (3EA/BX)

## (undated) DEVICE — NEEDLE PERCUTANEOUS THINWALL 7CM 18GA BSDN 18-7.0

## (undated) DEVICE — CATHETER ON-Q SILVER SOAKER 5IN  (5EA/CA)  - SUB ORDER #4428

## (undated) DEVICE — DRESSING TRANSPARENT FILM TEGADERM 4 X 4.75" (50EA/BX)"

## (undated) DEVICE — NEPTUNE 4 PORT MANIFOLD - (20/PK)

## (undated) DEVICE — GLOVE BIOGEL ECLIPSE PF LATEX SIZE 8.0  (50PR/BX)

## (undated) DEVICE — IV SET, NON-VENT PLUMB PUMP

## (undated) DEVICE — SOD. CHL. INJ. 0.9% 1000 ML - (14EA/CA 60CA/PF)

## (undated) DEVICE — SUTURE 1 VICRYL PLUS CT-1 - 18 INCH (12/BX)

## (undated) DEVICE — GLOVE, BIOGEL ECLIPSE, SZ 7.0, PF LTX (50/BX)

## (undated) DEVICE — STAPLER SKIN DISP - (6/BX 10BX/CA) VISISTAT

## (undated) DEVICE — STOPCOCK 3-WAY W/SWIVEL LEVER LOCK (50EA/CA)

## (undated) DEVICE — ELECTRODE RADIOLUCNT SOLID GEL DEFIB PADS (12EA/CA)

## (undated) DEVICE — CATHETER 6FR AL1 100CM (5/BX)